# Patient Record
Sex: FEMALE | Race: WHITE | NOT HISPANIC OR LATINO | Employment: OTHER | URBAN - METROPOLITAN AREA
[De-identification: names, ages, dates, MRNs, and addresses within clinical notes are randomized per-mention and may not be internally consistent; named-entity substitution may affect disease eponyms.]

---

## 2017-01-03 ENCOUNTER — TELEPHONE (OUTPATIENT)
Dept: NEUROLOGY | Facility: CLINIC | Age: 82
End: 2017-01-03

## 2017-01-03 NOTE — TELEPHONE ENCOUNTER
----- Message from Ayan Alegre sent at 1/3/2017  8:41 AM CST -----  Contact: [phone]/daughter  Leidy  Pt daughter states her mom stopped taking the duloxetine (CYMBALTA) 60 MG capsule.  Pt states the medicine is too strong and makes her sleepy. Also daughter has a question about EEG test.  Please advise

## 2017-01-03 NOTE — TELEPHONE ENCOUNTER
I spoke with the daughter and she says her mother stopped the medication. The patient's daughter also says she is getting sick and if it gets worse she will cancel the EEG due to having to was her hair.  The patient will wash it Thursday night verses the morning of the EEG.

## 2017-01-04 ENCOUNTER — TELEPHONE (OUTPATIENT)
Dept: INTERNAL MEDICINE | Facility: CLINIC | Age: 82
End: 2017-01-04

## 2017-01-04 ENCOUNTER — TELEPHONE (OUTPATIENT)
Dept: FAMILY MEDICINE | Facility: CLINIC | Age: 82
End: 2017-01-04

## 2017-01-04 DIAGNOSIS — R05.9 COUGH: Primary | ICD-10-CM

## 2017-01-04 RX ORDER — BENZONATATE 200 MG/1
200 CAPSULE ORAL 2 TIMES DAILY PRN
Qty: 20 CAPSULE | Refills: 0 | Status: SHIPPED | OUTPATIENT
Start: 2017-01-04 | End: 2017-01-11

## 2017-01-05 ENCOUNTER — LAB VISIT (OUTPATIENT)
Dept: LAB | Facility: HOSPITAL | Age: 82
End: 2017-01-05
Attending: INTERNAL MEDICINE
Payer: MEDICARE

## 2017-01-05 DIAGNOSIS — T50.2X5A DIURETIC-INDUCED HYPOKALEMIA: ICD-10-CM

## 2017-01-05 DIAGNOSIS — E87.6 DIURETIC-INDUCED HYPOKALEMIA: ICD-10-CM

## 2017-01-05 LAB
ALBUMIN SERPL BCP-MCNC: 3.4 G/DL
ALP SERPL-CCNC: 102 U/L
ALT SERPL W/O P-5'-P-CCNC: 6 U/L
ANION GAP SERPL CALC-SCNC: 10 MMOL/L
AST SERPL-CCNC: 14 U/L
BILIRUB SERPL-MCNC: 0.7 MG/DL
BUN SERPL-MCNC: 10 MG/DL
CALCIUM SERPL-MCNC: 9.3 MG/DL
CHLORIDE SERPL-SCNC: 104 MMOL/L
CO2 SERPL-SCNC: 26 MMOL/L
CREAT SERPL-MCNC: 1 MG/DL
EST. GFR  (AFRICAN AMERICAN): 58.5 ML/MIN/1.73 M^2
EST. GFR  (NON AFRICAN AMERICAN): 50.8 ML/MIN/1.73 M^2
GLUCOSE SERPL-MCNC: 134 MG/DL
POTASSIUM SERPL-SCNC: 4.3 MMOL/L
PROT SERPL-MCNC: 6.7 G/DL
SODIUM SERPL-SCNC: 140 MMOL/L

## 2017-01-05 PROCEDURE — 80053 COMPREHEN METABOLIC PANEL: CPT

## 2017-01-05 PROCEDURE — 36415 COLL VENOUS BLD VENIPUNCTURE: CPT | Mod: PO

## 2017-01-05 NOTE — TELEPHONE ENCOUNTER
Informed daughter Tessalon sent to pharmacy and instructed to call office if no improvement. Daughter voices understanding.

## 2017-01-05 NOTE — TELEPHONE ENCOUNTER
----- Message from Hortencia Cherry LPN sent at 1/4/2017 11:24 AM CST -----  Contact: Nori/ daughter/ [phone]   Nori reports her mother is hoarse, coughing(non-Productive), no fever or chills.  Wants to know if you can prescribe cough med.  ----- Message -----     From: Sulma Sanders     Sent: 1/3/2017   9:18 AM       To: Sean MARR Staff    Requesting to speak to the nurse states the patient woke up with cough and phlegm wants to know if she can get a cough syrup?

## 2017-01-05 NOTE — TELEPHONE ENCOUNTER
----- Message from Pino Estrada MD sent at 1/4/2017  6:07 PM CST -----  Contact: Nori/ daughter/ [phone]  Patient can take Tessalon Perles.  I can send these to the pharmacy.    ----- Message -----     From: Hortencia Cherry LPN     Sent: 1/4/2017  11:24 AM       To: Pino Estrada MD     Nori reports her mother is hoarse, coughing(non-Productive), no fever or chills.  Wants to know if you can prescribe cough med.  ----- Message -----     From: Sulma Sanders     Sent: 1/3/2017   9:18 AM       To: Sean MARR Staff    Requesting to speak to the nurse states the patient woke up with cough and phlegm wants to know if she can get a cough syrup?

## 2017-01-06 ENCOUNTER — HOSPITAL ENCOUNTER (OUTPATIENT)
Dept: NEUROLOGY | Facility: HOSPITAL | Age: 82
Discharge: HOME | End: 2017-01-06
Attending: PSYCHIATRY & NEUROLOGY
Payer: MEDICARE

## 2017-01-06 PROCEDURE — 95816 EEG AWAKE AND DROWSY: CPT

## 2017-01-06 PROCEDURE — 95816 EEG AWAKE AND DROWSY: CPT | Mod: 26,,,

## 2017-01-09 NOTE — PROCEDURES
DATE OF STUDY:  01/06/2017    The patient of Dr. Kidd.    EEG NUMBER:  [id number]    CLINICAL INFORMATION:  An 87-year-old lady with abnormal jerking movements,   which occur when she is in pain or aggravated.  The patient also has substantial   insomnia and restless legs.    ELECTROENCEPHALOGRAM REPORT    METHODOLOGY:  Electroencephalographic (EEG) recording is recorded with   electrodes placed according to the International 10-20 placement system.  Thirty   two (32) channels of digital signal (sampling rate of 512/sec), including T1   and T2, were simultaneously recorded from the scalp and may include EKG, EMG,   and/or eye monitors.  Recording band pass was 0.1 to 512 Hz.  Digital video   recording of the patient is simultaneously recorded with the EEG.  The patient   is instructed to report clinical symptoms which may occur during the recording   session.  EEG and video recording are stored and archived in digital format.    Activation procedures, which include photic stimulation, hyperventilation and   instructing patients to perform simple tasks, are done in selected patients    The EEG is displayed on a monitor screen and can be reviewed using different   montages.  Computer assisted-analysis is employed to detect spike and   electrographic seizure activity.   The entire record is submitted for computer   analysis.  The entire recording is visually reviewed, and the times identified   by computer analysis as being spikes or seizures are reviewed again.      Compressed spectral analysis (CSA) is also performed on the activity recorded   from each individual channel.  This is displayed as a power display of   frequencies from 0 to 30 Hz over time.   The CSA is reviewed looking for   asymmetries in power between homologous areas of the scalp, then compared with   the original EEG recording.      [employer] software was also utilized in the review of this study.  This software   suite analyzes the EEG recording in  multiple domains.  Coherence and rhythmicity   are computed to identify EEG sections which may contain organized seizures.    Each channel undergoes analysis to detect the presence of spike and sharp waves   which have special and morphological characteristics of epileptic activity.  The   routine EEG recording is converted from special into frequency domain.  This is   then displayed comparing homologous areas to identify areas of significant   asymmetry.  Algorithm to identify non-cortically generated artifact is used to   separate artifact from the EEG.    EEG FINDINGS:  The resting background is low voltage fast activity.  Some   occasional semi-rhythmical 8-9 cycle per second posterior alpha rhythm can be   identified at intervals as well.  Rather prominent muscle as well, some eye   movement artifact is seen more anteriorly when the patient is fully alert.    Well-developed sleep record is never identified.  Photic stimulation does not   significantly alter the record.  There is a resting tachycardia with some   irregularity up to 110 beats per minute on the EKG channel recording.    IMPRESSION:  This is a normal waking EEG.    CLINICAL IMPLICATION:  This record fails to give evidence of cerebral electrical   dysfunction.  Incidental note is made of a mild resting tachycardia with some   irregularity.  Clinical correlation is recommended.      CORIE  dd: 01/09/2017 13:00:34 (CST)  td: 01/09/2017 16:02:10 ([facility])  Doc ID   #[id number]  Job ID #[id number]    CC:

## 2017-01-23 DIAGNOSIS — G25.81 RESTLESS LEG SYNDROME: ICD-10-CM

## 2017-01-23 RX ORDER — PRAMIPEXOLE DIHYDROCHLORIDE 0.12 MG/1
0.25 TABLET ORAL 2 TIMES DAILY PRN
Qty: 120 TABLET | Refills: 1 | Status: SHIPPED | OUTPATIENT
Start: 2017-01-23 | End: 2017-02-02 | Stop reason: ALTCHOICE

## 2017-01-24 ENCOUNTER — TELEPHONE (OUTPATIENT)
Dept: INTERNAL MEDICINE | Facility: CLINIC | Age: 82
End: 2017-01-24

## 2017-01-24 NOTE — TELEPHONE ENCOUNTER
Spoke with pt's daughter Nori to informed that a new prescription has been sent to the pharmacy with one refill. Ms. Julio voiced understanding.

## 2017-01-24 NOTE — TELEPHONE ENCOUNTER
----- Message from Pino Estrada MD sent at 1/23/2017  6:54 PM CST -----  Contact: self/[phone]  There should be plenty of refills at the pharmacy    ----- Message -----     From: Milana Kendall MA     Sent: 1/23/2017  10:38 AM       To: Pino Estrada MD    Please advise  ----- Message -----     From: Chetna Su     Sent: 1/23/2017  10:03 AM       To: Sean Romero    The prescription you wrote for the   pramipexole (MIRAPEX) 0.125 MG tablet is only 60 tablets and she takes 4 a day; she is going to run out.

## 2017-02-02 ENCOUNTER — LAB VISIT (OUTPATIENT)
Dept: LAB | Facility: HOSPITAL | Age: 82
End: 2017-02-02
Attending: INTERNAL MEDICINE
Payer: MEDICARE

## 2017-02-02 ENCOUNTER — OFFICE VISIT (OUTPATIENT)
Dept: INTERNAL MEDICINE | Facility: CLINIC | Age: 82
End: 2017-02-02
Payer: MEDICARE

## 2017-02-02 VITALS
SYSTOLIC BLOOD PRESSURE: 118 MMHG | OXYGEN SATURATION: 97 % | HEART RATE: 88 BPM | BODY MASS INDEX: 25.82 KG/M2 | WEIGHT: 119.69 LBS | HEIGHT: 57 IN | DIASTOLIC BLOOD PRESSURE: 50 MMHG

## 2017-02-02 DIAGNOSIS — M54.16 LUMBAR RADICULOPATHY: Primary | ICD-10-CM

## 2017-02-02 DIAGNOSIS — G62.9 PERIPHERAL POLYNEUROPATHY: ICD-10-CM

## 2017-02-02 DIAGNOSIS — G25.81 RESTLESS LEG SYNDROME: ICD-10-CM

## 2017-02-02 DIAGNOSIS — G25.3 MYOCLONUS: ICD-10-CM

## 2017-02-02 LAB
ALBUMIN SERPL BCP-MCNC: 3.7 G/DL
ALP SERPL-CCNC: 98 U/L
ALT SERPL W/O P-5'-P-CCNC: 8 U/L
ANION GAP SERPL CALC-SCNC: 6 MMOL/L
AST SERPL-CCNC: 15 U/L
BILIRUB SERPL-MCNC: 0.9 MG/DL
BUN SERPL-MCNC: 17 MG/DL
CALCIUM SERPL-MCNC: 9.3 MG/DL
CHLORIDE SERPL-SCNC: 106 MMOL/L
CO2 SERPL-SCNC: 25 MMOL/L
CREAT SERPL-MCNC: 1.1 MG/DL
EST. GFR  (AFRICAN AMERICAN): 52.2 ML/MIN/1.73 M^2
EST. GFR  (NON AFRICAN AMERICAN): 45.3 ML/MIN/1.73 M^2
GLUCOSE SERPL-MCNC: 87 MG/DL
MAGNESIUM SERPL-MCNC: 2.1 MG/DL
POTASSIUM SERPL-SCNC: 4.6 MMOL/L
PROT SERPL-MCNC: 6.7 G/DL
SODIUM SERPL-SCNC: 137 MMOL/L

## 2017-02-02 PROCEDURE — 80053 COMPREHEN METABOLIC PANEL: CPT

## 2017-02-02 PROCEDURE — 84446 ASSAY OF VITAMIN E: CPT

## 2017-02-02 PROCEDURE — 99214 OFFICE O/P EST MOD 30 MIN: CPT | Mod: S$GLB,,, | Performed by: INTERNAL MEDICINE

## 2017-02-02 PROCEDURE — 1157F ADVNC CARE PLAN IN RCRD: CPT | Mod: S$GLB,,, | Performed by: INTERNAL MEDICINE

## 2017-02-02 PROCEDURE — 84591 ASSAY OF NOS VITAMIN: CPT

## 2017-02-02 PROCEDURE — 1159F MED LIST DOCD IN RCRD: CPT | Mod: S$GLB,,, | Performed by: INTERNAL MEDICINE

## 2017-02-02 PROCEDURE — 83735 ASSAY OF MAGNESIUM: CPT

## 2017-02-02 PROCEDURE — 1160F RVW MEDS BY RX/DR IN RCRD: CPT | Mod: S$GLB,,, | Performed by: INTERNAL MEDICINE

## 2017-02-02 PROCEDURE — 99999 PR PBB SHADOW E&M-EST. PATIENT-LVL III: CPT | Mod: PBBFAC,,, | Performed by: INTERNAL MEDICINE

## 2017-02-02 PROCEDURE — 1125F AMNT PAIN NOTED PAIN PRSNT: CPT | Mod: S$GLB,,, | Performed by: INTERNAL MEDICINE

## 2017-02-02 PROCEDURE — 36415 COLL VENOUS BLD VENIPUNCTURE: CPT | Mod: PO

## 2017-02-02 RX ORDER — GABAPENTIN 100 MG/1
100 CAPSULE ORAL 3 TIMES DAILY
Qty: 90 CAPSULE | Refills: 1 | COMMUNITY
Start: 2017-02-02 | End: 2017-02-08 | Stop reason: SDUPTHER

## 2017-02-02 RX ORDER — TRAMADOL HYDROCHLORIDE 50 MG/1
TABLET ORAL
Qty: 180 TABLET | Refills: 0 | COMMUNITY
Start: 2017-02-02 | End: 2017-02-03 | Stop reason: SDUPTHER

## 2017-02-02 NOTE — PATIENT INSTRUCTIONS
· 3 times per day tramadol, 1 tablet of the 50 mg  · Should have some effect at 1 hour with max within 2 hours  · If 50 mg does not work at 1 hour a 2nd tablet can be taken  · No MORE than 6 tablets of the 50 mg in 24hrs   · Do not take mirapex at this time  · Gabapentin can be taken 1 tablet 3 times daily. For it to make any difference it has to be taken daily.   · If tramadol is causing sedation, stagger how you give the gabapentin, for example wait 2 hrs after tramadol dose  · Stool softner on tramadol if constipation is starting to happen   ·

## 2017-02-02 NOTE — MR AVS SNAPSHOT
[facility]   [place]  [place] [address]  Phone: [phone]  Fax: [phone]                  Angel Rincon   2017 2:40 PM   Office Visit    Description:  Female : 1929   Provider:  Pino Estrada MD   Department:  [facility]           Reason for Visit     Hypertension                To Do List           Future Appointments        Provider Department Dept Phone    2017 3:30 PM [facility] [phone]    2017 1:20 PM Xu Kidd MD [facility] [phone]    3/2/2017 2:00 PM Pino Estrada MD [facility] [phone]    3/23/2017 9:40 AM Xu Kidd MD [facility] [phone]    3/23/2017 1:50 PM Caitlyn Loyola MD [place] [facility] [phone]      Goals (5 Years of Data)     None      [facility] On [facility]     [facility] Care [facility] -  Assistance  Registered nurses in the [facility] provide clinical advisement, health education, appointment booking, and other advisory services.  Call for this free service at [phone].             Medications           Message regarding Medications     Verify the changes and/or additions to your medication regime listed below are the same as discussed with your clinician today.  If any of these changes or additions are incorrect, please notify your healthcare provider.        CHANGE how you are taking these medications     Start Taking Instead of    tramadol (ULTRAM) 50 mg tablet tramadol (ULTRAM) 50 mg tablet  (Previously Discontinued)    Dosage:  1-2 tablets as needed for severe pain every 8 hrs Dosage:  Take 50 mg by mouth daily as needed.      Reason for Change:  Reorder       STOP taking these medications     pramipexole (MIRAPEX) 0.125 MG tablet Take 2 tablets (0.25 mg total) by mouth 2 (two) times daily as needed.           Verify that the below list of medications is an accurate  "representation of the medications you are currently taking.  If none reported, the list may be blank. If incorrect, please contact your healthcare provider. Carry this list with you in case of emergency.           Current Medications     diltiazem (CARDIZEM CD) 240 MG 24 hr capsule     furosemide (LASIX) 20 MG tablet Take 20 mg by mouth once daily.    hydroxyurea (HYDREA) 500 mg Cap TAKE 1 CAPSULE TWICE DAILY    losartan (COZAAR) 25 MG tablet Take 25 mg by mouth once daily.    melatonin 10 mg Cap Take 10 mg by mouth every evening.    nitroGLYCERIN 0.4 MG/DOSE TL SPRY (NITROLINGUAL) 0.4 mg/dose spray     potassium chloride SA (K-DUR,KLOR-CON) 20 MEQ tablet Take 1 tablet (20 mEq total) by mouth once daily.    rivaroxaban (XARELTO) 20 mg Tab Take by mouth once daily.     rosuvastatin (CRESTOR) 10 MG tablet Take 10 mg by mouth once daily.      gabapentin (NEURONTIN) 100 MG capsule Take 1 capsule (100 mg total) by mouth 3 (three) times daily.    tramadol (ULTRAM) 50 mg tablet 1-2 tablets as needed for severe pain every 8 hrs           Clinical Reference Information           Your Vitals Were     BP Pulse Height Weight SpO2 BMI    118/50 (BP Location: Right arm, Patient Position: Sitting, BP Method: Manual) 88 4' 9" (1.448 m) 54.3 kg (119 lb 11.4 oz) 97% 25.9 kg/m2      Blood Pressure          Most Recent Value    BP  (!)  118/50      Allergies as of 2/2/2017     Citalopram    Codeine    Sulfa (Sulfonamide Antibiotics)    Xanax [Alprazolam]      Immunizations Administered on Date of Encounter - 2/2/2017     None      Instructions    · 3 times per day tramadol, 1 tablet of the 50 mg  · Should have some effect at 1 hour with max within 2 hours  · If 50 mg does not work at 1 hour a 2nd tablet can be taken  · No MORE than 6 tablets of the 50 mg in 24hrs   · Do not take mirapex at this time  · Gabapentin can be taken 1 tablet 3 times daily. For it to make any difference it has to be taken daily.   · If tramadol is causing " sedation, stagger how you give the gabapentin, for example wait 2 hrs after tramadol dose  · Stool softner on tramadol if constipation is starting to happen   ·        Language Assistance Services     ATTENTION: Language assistance services are available, free of charge. Please call [phone].      ATENCIÓN: Si habla ynes, tiene a devries disposición servicios gratuitos de asistencia lingüística. Llame al [phone].     CHÚ Ý: N?u b?n nói Ti?ng Vi?t, có các d?ch v? h? tr? ngôn ng? mi?n phí dành cho b?n. G?i s? [phone].         [facility] complies with applicable Federal civil rights laws and does not discriminate on the basis of race, color, national origin, age, disability, or sex.

## 2017-02-03 ENCOUNTER — TELEPHONE (OUTPATIENT)
Dept: PAIN MEDICINE | Facility: CLINIC | Age: 82
End: 2017-02-03

## 2017-02-03 ENCOUNTER — TELEPHONE (OUTPATIENT)
Dept: INTERNAL MEDICINE | Facility: CLINIC | Age: 82
End: 2017-02-03

## 2017-02-03 DIAGNOSIS — M54.17 LUMBOSACRAL RADICULOPATHY: Primary | ICD-10-CM

## 2017-02-03 DIAGNOSIS — G25.81 RESTLESS LEG SYNDROME: ICD-10-CM

## 2017-02-03 RX ORDER — TRAMADOL HYDROCHLORIDE 50 MG/1
50 TABLET ORAL EVERY 8 HOURS PRN
Qty: 60 TABLET | Refills: 0 | Status: SHIPPED | OUTPATIENT
Start: 2017-02-03 | End: 2017-03-05

## 2017-02-03 NOTE — TELEPHONE ENCOUNTER
----- Message from Pino Estrada MD sent at 2/3/2017 12:58 PM CST -----  Please contact patient's daughter.  Prescription tramadol sent to the pharmacy.  One month supply only.  Refill contingent on patient coming for 1 month follow-up.      ----- Message -----     From: Milana Kendall MA     Sent: 2/3/2017  11:51 AM       To: Pino Estrada MD    Please advise  ----- Message -----     From: Lita Aranda     Sent: 2/3/2017  11:44 AM       To: Sean MARR Staff    Patient no. [phone]    Patient needs Tramadol, 3 daily, called into [facility] in [place]    Patient needs today.

## 2017-02-03 NOTE — TELEPHONE ENCOUNTER
Left message for pt's daughter Nori to inform that pt's prescription has been sent to the pharmacy.

## 2017-02-03 NOTE — TELEPHONE ENCOUNTER
appt scheduled with pt's daughter, Nori for Thursday, March 2, 2017 at 230pm.  appt letter mailed to pt's home.  She verbalized understanding of appt date/time.

## 2017-02-03 NOTE — TELEPHONE ENCOUNTER
----- Message from Pino Estrada MD sent at 2/3/2017 12:24 PM CST -----  Can your office please reach out to get something scheduled?     ----- Message -----     From: Jerry Mccord MD     Sent: 2/3/2017   8:49 AM       To: Pino Estrada MD    Yes, she responded well to the caudal MAUREEN, it just didn't last enough, i think she should come back and see me for possible another injection, I agree with you that she should not be on both together   ----- Message -----     From: Pino Estrada MD     Sent: 2/2/2017   5:28 PM       To: Jerry Mccord MD    She definitely has multiple sources of pain. If she can tolerate a neuropathic pain med I will keep her on it, but when she starts narcotics and then asks for RLS meds on top of all of that, it gets tricky. This pain patterns sounds like radiating pain from the back as her principal pain complaint now    ----- Message -----     From: Jerry Mccord MD     Sent: 2/2/2017   3:32 PM       To: Pino Estrada MD    She is a tough one. Did she respond to injections? Keep in mind she had bad neuropathy rekated to alcohol abuse in the past.  ----- Message -----     From: Pino Estrada MD     Sent: 2/2/2017   3:26 PM       To: Jerry Mccord MD    Patient continues to have RLE radiculopathy. Starting narcotics again given that her predominant pain pattern doesn't seem to be RLS and she does terribly when combining mirapex with narcotic. What other options care you perceive for her?

## 2017-02-04 NOTE — PROGRESS NOTES
Portions of this note are generated with voice recognition software. Typographical errors may exist.     SUBJECTIVE:    This is a/an 87 y.o. female here for primary care visit for  Chief Complaint   Patient presents with    Chronic Pain     follow up     Chronic pain.  Patient states that she has at least 3 pain patterns.  States that she has tingling in her lower extremities, she has radiculopathy starting in the hip and radiating down her right leg, and muscle spasms.  Principally the patient states that radiculopathy is the main source of pain for her.  States that when she has a sudden onset of pain she will stand up and this helps to distract her from the pain.  Pain syndromes have resulted in acute on chronic insomnia.  Patient and daughter verify that she is not using narcotic medication even though they have narcotic medication at home left over from previous prescriptions.  Daughter states that narcotic medication has been secured.    Current pain management regimen includes gabapentin 100 mg 3 times daily as recommended by recent neurologist.  Mirapex 3 tablets in the morning and one tablet later in the afternoon.  Patient states that this is not working at all.  Patient does not voice using muscle relaxer.    Myoclonic jerks.  Patient continues to suffer from this.  Causes significant disruption in sleeping behavior.  Also causes significant emotional distress.  Patient would like to pursue additional diagnostic measures.    Medications Reviewed and Updated    Past medical, family, and social histories were reviewed and updated.    Review of Systems negative unless noted otherwise in history of present illness-  General ROS: negative  Psychological ROS: negative  Hematological and Lymphatic ROS: negative  Musculoskeletal ROS: negative  Neurological ROS: negative    Allergic:    Review of patient's allergies indicates:   Allergen Reactions    Citalopram      Other reaction(s): agitation    Codeine  Nausea Only    Sulfa (sulfonamide antibiotics)      Other reaction(s): Swelling    Xanax [alprazolam] Other (See Comments)     Unspecified reaction       OBJECTIVE:  BP: (!) 118/50 Pulse: 88    Wt Readings from Last 3 Encounters:   02/02/17 54.3 kg (119 lb 11.4 oz)   12/23/16 52.2 kg (115 lb 1.3 oz)   12/11/16 52.2 kg (115 lb)    Body mass index is 25.9 kg/(m^2).  Previous Blood Pressure Readings :   BP Readings from Last 3 Encounters:   02/02/17 (!) 118/50   12/23/16 137/71   12/12/16 (!) 122/58     GEN: No apparent distress  HEENT: sclera non-icteric, conjunctiva clear  CV: no peripheral edema  PULM: breathing non-labored  ABD: Obese, protuberant abdomen.  PSYCH moderate psychomotor distress due to pain.  Pacing in the room.  MSK: able to rise from chair without assistance  · No significant point tenderness along the trochanteric bursa or sacroiliac joints.  Neurologic: Equal lower extremity strength bilaterally.  SKIN: normal skin turgor    Pertinent Labs Reviewed       ASSESSMENT/PLAN:    Chronic pain syndromes.  Multifactorial.  Lumbosacral radiculopathy.  Restless leg syndrome.  Myoclonic jerking.  Recommend patient suspend Mirapex.  Continue gabapentin 100 mg 3 times a day.  Tramadol 1-2 tablets every 8 hours.  educated patient on common and serious side effects of this medication.  This is not an ideal dose for neuropathic pain however in this elderly patient she likely would not tolerate higher doses in the setting of narcotic medication.  Given acuity of symptoms, recommend she resume narcotic medication for the next 30 days and return to pain management for repeat injection.  Return to clinic within 30 days to assess ongoing need for narcotic medication.  Ultimately the patient needs referral for pain management.  She has proven to be refractory to multiple pharmacologic approaches.    Myoclonic jerking.  Not optimally controlled.  Recommend patient pursue further workup in the seizure monitoring unit  per neurology        Future Appointments  Date Time Provider Department Center   2/16/2017 1:20 PM Xu Kidd MD [facility] NEURO [facility]   3/2/2017 2:00 PM Pino Estrada MD [facility]   3/2/2017 2:30 PM Jerry Mccord MD [facility] PAINMGT [facility]   3/23/2017 9:40 AM Xu Kidd MD [facility] NEURO [facility]   4/6/2017 2:20 PM Caitlyn Loyola MD [facility]       Pino Estrada  2/4/2017  4:27 PM

## 2017-02-07 LAB — A-TOCOPHEROL VIT E SERPL-MCNC: 539 UG/DL (ref 500–1800)

## 2017-02-08 ENCOUNTER — TELEPHONE (OUTPATIENT)
Dept: FAMILY MEDICINE | Facility: CLINIC | Age: 82
End: 2017-02-08

## 2017-02-08 DIAGNOSIS — G62.9 PERIPHERAL POLYNEUROPATHY: ICD-10-CM

## 2017-02-08 DIAGNOSIS — F10.21 CHRONIC ALCOHOLISM IN REMISSION: Primary | ICD-10-CM

## 2017-02-08 DIAGNOSIS — G25.81 RESTLESS LEG SYNDROME: ICD-10-CM

## 2017-02-08 DIAGNOSIS — M54.16 LUMBAR RADICULOPATHY: ICD-10-CM

## 2017-02-08 RX ORDER — GABAPENTIN 100 MG/1
CAPSULE ORAL
Qty: 60 CAPSULE | Refills: 1 | Status: SHIPPED | OUTPATIENT
Start: 2017-02-08 | End: 2017-03-23

## 2017-02-08 NOTE — TELEPHONE ENCOUNTER
Daughter states that patient is having difficulty with breakthrough pain.  States that tramadol does provide relief for approximately 2 or 3 hours.  Patient is taking it 2-3 times per day.  However, the patient had a rough morning with side effects and has decided to completely abandon this pain management plan.  Daughter wonders what to do next.  Side effects described but the patient include general fatigue, sedation.    This provider recommends suspending gabapentin as it is likely contributing to sedation in the setting of narcotic medication.  Low-dose gabapentin is unlikely to produce significant pain reducing effect.  Therefore I recommend continuing tramadol 3 times daily as needed.  Given patient's significant nighttime symptoms, patient can elect to restart gabapentin 1-2 tablets daily at bedtime.    Recommend strongly that family establish with pain management specialist to further address pharmacotherapy.  At the same time, recommend that patient not abandon appointment with Dr. Mccord in the future.  Also strongly recommend patient continue to follow with neurology.  Daughter voicing understanding.

## 2017-02-09 ENCOUNTER — TELEPHONE (OUTPATIENT)
Dept: INTERNAL MEDICINE | Facility: CLINIC | Age: 82
End: 2017-02-09

## 2017-02-09 ENCOUNTER — TELEPHONE (OUTPATIENT)
Dept: NEUROLOGY | Facility: CLINIC | Age: 82
End: 2017-02-09

## 2017-02-09 NOTE — TELEPHONE ENCOUNTER
----- Message from Kelsie Patton sent at 2/9/2017 10:42 AM CST -----  Contact: self/[phone]  Patient would like to know if Isamar is working on the appointment for the pain management clinic in [place] because she didn't sleep at all last night.  Please advise

## 2017-02-09 NOTE — TELEPHONE ENCOUNTER
Informed daughter mother is scheduled with Dr. Jay in [place] for 2- at 130pm. Daughter was given option for sooner appt but would be at [facility]. Daughter reports she would prefer to take the [employer] appt.

## 2017-02-09 NOTE — TELEPHONE ENCOUNTER
Spoke to Nori, pts daughter, who stated she will have to speak to her mother to see if she will stay for an EMU admission. Nori has my direct # to call and schedule.

## 2017-02-16 LAB — BIOTIN SERPL-SCNC: 338.9 PG/ML (ref 221–3004)

## 2017-03-01 ENCOUNTER — HOSPITAL ENCOUNTER (OUTPATIENT)
Facility: HOSPITAL | Age: 82
Discharge: HOME | End: 2017-03-02
Attending: EMERGENCY MEDICINE | Admitting: INTERNAL MEDICINE
Payer: MEDICARE

## 2017-03-01 DIAGNOSIS — I10 ESSENTIAL HYPERTENSION: ICD-10-CM

## 2017-03-01 DIAGNOSIS — L03.115 BILATERAL LOWER LEG CELLULITIS: ICD-10-CM

## 2017-03-01 DIAGNOSIS — D45 POLYCYTHEMIA VERA: ICD-10-CM

## 2017-03-01 DIAGNOSIS — D64.9 NORMOCYTIC ANEMIA: ICD-10-CM

## 2017-03-01 DIAGNOSIS — L03.116 BILATERAL LOWER LEG CELLULITIS: ICD-10-CM

## 2017-03-01 DIAGNOSIS — R06.02 SOB (SHORTNESS OF BREATH): ICD-10-CM

## 2017-03-01 DIAGNOSIS — I48.91 ATRIAL FIBRILLATION, CONTROLLED: ICD-10-CM

## 2017-03-01 DIAGNOSIS — I50.9 ACUTE CONGESTIVE HEART FAILURE, UNSPECIFIED CONGESTIVE HEART FAILURE TYPE: Primary | ICD-10-CM

## 2017-03-01 DIAGNOSIS — F17.229 CHEWING TOBACCO NICOTINE DEPENDENCE WITH NICOTINE-INDUCED DISORDER: ICD-10-CM

## 2017-03-01 DIAGNOSIS — E78.5 HYPERLIPIDEMIA, UNSPECIFIED HYPERLIPIDEMIA TYPE: ICD-10-CM

## 2017-03-01 DIAGNOSIS — G25.81 RESTLESS LEG SYNDROME: ICD-10-CM

## 2017-03-01 DIAGNOSIS — D72.829 LEUKOCYTOSIS, UNSPECIFIED TYPE: ICD-10-CM

## 2017-03-01 DIAGNOSIS — Z95.1 S/P CABG (CORONARY ARTERY BYPASS GRAFT): ICD-10-CM

## 2017-03-01 DIAGNOSIS — D75.839 THROMBOCYTOSIS: ICD-10-CM

## 2017-03-01 LAB
ALBUMIN SERPL BCP-MCNC: 3.8 G/DL
ALP SERPL-CCNC: 110 U/L
ALT SERPL W/O P-5'-P-CCNC: 8 U/L
ANION GAP SERPL CALC-SCNC: 8 MMOL/L
ANISOCYTOSIS BLD QL SMEAR: SLIGHT
AST SERPL-CCNC: 14 U/L
BASOPHILS # BLD AUTO: ABNORMAL K/UL
BASOPHILS NFR BLD: 0 %
BILIRUB SERPL-MCNC: 1 MG/DL
BNP SERPL-MCNC: 268 PG/ML
BUN SERPL-MCNC: 15 MG/DL
CALCIUM SERPL-MCNC: 9.1 MG/DL
CHLORIDE SERPL-SCNC: 103 MMOL/L
CO2 SERPL-SCNC: 25 MMOL/L
CREAT SERPL-MCNC: 0.9 MG/DL
DIFFERENTIAL METHOD: ABNORMAL
EOSINOPHIL # BLD AUTO: ABNORMAL K/UL
EOSINOPHIL NFR BLD: 0 %
ERYTHROCYTE [DISTWIDTH] IN BLOOD BY AUTOMATED COUNT: 19 %
EST. GFR  (AFRICAN AMERICAN): >60 ML/MIN/1.73 M^2
EST. GFR  (NON AFRICAN AMERICAN): 58 ML/MIN/1.73 M^2
GLUCOSE SERPL-MCNC: 109 MG/DL
HCT VFR BLD AUTO: 38.7 %
HGB BLD-MCNC: 11 G/DL
HYPOCHROMIA BLD QL SMEAR: ABNORMAL
LYMPHOCYTES # BLD AUTO: ABNORMAL K/UL
LYMPHOCYTES NFR BLD: 6 %
MCH RBC QN AUTO: 23.8 PG
MCHC RBC AUTO-ENTMCNC: 28.4 %
MCV RBC AUTO: 84 FL
MONOCYTES # BLD AUTO: ABNORMAL K/UL
MONOCYTES NFR BLD: 3 %
NEUTROPHILS NFR BLD: 89 %
NEUTS BAND NFR BLD MANUAL: 2 %
OVALOCYTES BLD QL SMEAR: ABNORMAL
PLATELET # BLD AUTO: 451 K/UL
PLATELET BLD QL SMEAR: ABNORMAL
PMV BLD AUTO: 10.7 FL
POIKILOCYTOSIS BLD QL SMEAR: SLIGHT
POLYCHROMASIA BLD QL SMEAR: ABNORMAL
POTASSIUM SERPL-SCNC: 3.8 MMOL/L
PROT SERPL-MCNC: 6.7 G/DL
RBC # BLD AUTO: 4.62 M/UL
SODIUM SERPL-SCNC: 136 MMOL/L
TROPONIN I SERPL DL<=0.01 NG/ML-MCNC: 0.01 NG/ML
WBC # BLD AUTO: 17.73 K/UL

## 2017-03-01 PROCEDURE — 99285 EMERGENCY DEPT VISIT HI MDM: CPT | Mod: 25

## 2017-03-01 PROCEDURE — 84484 ASSAY OF TROPONIN QUANT: CPT

## 2017-03-01 PROCEDURE — 80053 COMPREHEN METABOLIC PANEL: CPT

## 2017-03-01 PROCEDURE — 63600175 PHARM REV CODE 636 W HCPCS: Performed by: EMERGENCY MEDICINE

## 2017-03-01 PROCEDURE — 96374 THER/PROPH/DIAG INJ IV PUSH: CPT

## 2017-03-01 PROCEDURE — 85027 COMPLETE CBC AUTOMATED: CPT

## 2017-03-01 PROCEDURE — 83880 ASSAY OF NATRIURETIC PEPTIDE: CPT

## 2017-03-01 PROCEDURE — 25000003 PHARM REV CODE 250: Performed by: EMERGENCY MEDICINE

## 2017-03-01 PROCEDURE — 93005 ELECTROCARDIOGRAM TRACING: CPT

## 2017-03-01 PROCEDURE — 85007 BL SMEAR W/DIFF WBC COUNT: CPT

## 2017-03-01 RX ORDER — CEPHALEXIN 500 MG/1
500 CAPSULE ORAL
Status: COMPLETED | OUTPATIENT
Start: 2017-03-01 | End: 2017-03-01

## 2017-03-01 RX ORDER — FUROSEMIDE 10 MG/ML
40 INJECTION INTRAMUSCULAR; INTRAVENOUS
Status: COMPLETED | OUTPATIENT
Start: 2017-03-01 | End: 2017-03-01

## 2017-03-01 RX ADMIN — CEPHALEXIN 500 MG: 500 CAPSULE ORAL at 11:03

## 2017-03-01 RX ADMIN — FUROSEMIDE 40 MG: 10 INJECTION, SOLUTION INTRAMUSCULAR; INTRAVENOUS at 11:03

## 2017-03-01 NOTE — IP AVS SNAPSHOT
[facility]  [address]  [place] [address]  Phone: [phone]           Patient Discharge Instructions     Our goal is to set you up for success. This packet includes information on your condition, medications, and your home care. It will help you to care for yourself so you don't get sicker and need to go back to the hospital.     Please ask your nurse if you have any questions.        There are many details to remember when preparing to leave the hospital. Here is what you will need to do:    1. Take your medicine. If you are prescribed medications, review your Medication List in the following pages. You may have new medications to  at the pharmacy and others that you'll need to stop taking. Review the instructions for how and when to take your medications. Talk with your doctor or nurses if you are unsure of what to do.     2. Go to your follow-up appointments. Specific follow-up information is listed in the following pages. Your may be contacted by a transition nurse or clinical provider about future appointments. Be sure we have all of the phone numbers to reach you, if needed. Please contact your provider's office if you are unable to make an appointment.     3. Watch for warning signs. Your doctor or nurse will give you detailed warning signs to watch for and when to call for assistance. These instructions may also include educational information about your condition. If you experience any of warning signs to your health, call your doctor.               ** Verify the list of medication(s) below is accurate and up to date. Carry this with you in case of emergency. If your medications have changed, please notify your healthcare provider.             Medication List      START taking these medications        Additional Info                      carvedilol 6.25 MG tablet   Commonly known as:  COREG   Quantity:  60 tablet   Refills:  11   Dose:  6.25 mg    Instructions:  Take 1  tablet (6.25 mg total) by mouth 2 (two) times daily with meals.     Begin Date    AM    Noon    PM    Bedtime         CHANGE how you take these medications        Additional Info                      furosemide 40 MG tablet   Commonly known as:  LASIX   Quantity:  90 tablet   Refills:  6   Dose:  40 mg   What changed:    - medication strength  - how much to take    Instructions:  Take 1 tablet (40 mg total) by mouth once daily.     Begin Date    AM    Noon    PM    Bedtime       losartan 25 MG tablet   Commonly known as:  COZAAR   Quantity:  90 tablet   Refills:  7   Dose:  50 mg   What changed:  how much to take    Last time this was given:  50 mg on 3/2/2017  8:41 AM   Instructions:  Take 2 tablets (50 mg total) by mouth once daily.     Begin Date    AM    Noon    PM    Bedtime         CONTINUE taking these medications        Additional Info                      diltiaZEM 240 MG 24 hr capsule   Commonly known as:  CARDIZEM CD   Refills:  0    Last time this was given:  240 mg on 3/2/2017  8:41 AM     Begin Date    AM    Noon    PM    Bedtime       gabapentin 100 MG capsule   Commonly known as:  NEURONTIN   Quantity:  60 capsule   Refills:  1    Instructions:  1-2 tablets qHS. Stop if causing excess sedation     Begin Date    AM    Noon    PM    Bedtime       hydroxyurea 500 mg Cap   Commonly known as:  HYDREA   Quantity:  60 capsule   Refills:  5    Last time this was given:  500 mg on 3/2/2017  8:41 AM   Instructions:  TAKE 1 CAPSULE TWICE DAILY     Begin Date    AM    Noon    PM    Bedtime       melatonin 10 mg Cap   Refills:  0   Dose:  10 mg    Instructions:  Take 10 mg by mouth every evening.     Begin Date    AM    Noon    PM    Bedtime       nitroGLYCERIN 0.4 MG/DOSE TL SPRY 400 mcg/spray spray   Commonly known as:  NITROLINGUAL   Refills:  0      Begin Date    AM    Noon    PM    Bedtime       potassium chloride SA 20 MEQ tablet   Commonly known as:  K-DUR,KLOR-CON   Quantity:  90 tablet   Refills:  1    Dose:  20 mEq    Instructions:  Take 1 tablet (20 mEq total) by mouth once daily.     Begin Date    AM    Noon    PM    Bedtime       rosuvastatin 10 MG tablet   Commonly known as:  CRESTOR   Refills:  0   Dose:  10 mg    Last time this was given:  10 mg on 3/2/2017  8:41 AM   Instructions:  Take 10 mg by mouth once daily.     Begin Date    AM    Noon    PM    Bedtime       tramadol 50 mg tablet   Commonly known as:  ULTRAM   Quantity:  60 tablet   Refills:  0   Dose:  50 mg    Instructions:  Take 1 tablet (50 mg total) by mouth every 8 (eight) hours as needed (severe pain only). 2nd tablet allowed if no relief at 1 hr     Begin Date    AM    Noon    PM    Bedtime       XARELTO 20 mg Tab   Refills:  0   Generic drug:  rivaroxaban    Last time this was given:  15 mg on 3/2/2017  8:41 AM   Instructions:  Take by mouth once daily.     Begin Date    AM    Noon    PM    Bedtime            Where to Get Your Medications      You can get these medications from any pharmacy     Bring a paper prescription for each of these medications     carvedilol 6.25 MG tablet    furosemide 40 MG tablet    losartan 25 MG tablet                  Please bring to all follow up appointments:    1. A copy of your discharge instructions.  2. All medicines you are currently taking in their original bottles.  3. Identification and insurance card.    Please arrive 15 minutes ahead of scheduled appointment time.    Please call 24 hours in advance if you must reschedule your appointment and/or time.        Your Scheduled Appointments     Mar 07, 2017  2:00 PM [facility] Follow Up with MD Mirtha Florez - Internal Medicine [place])    [address]  [place] [address]   [phone]            Mar 23, 2017 10:40 AM CDT   Established Patient Visit with MD Criselda Rinconwood - Internal Medicine ([place])    [address]  [place] [address]   [phone]            Apr 06, 2017  2:20  PM CDT   Established Patient Visit with Caitlyn Loyola MD   [place] [facility] [place]    [address]  [place] [address] [phone]              Follow-up Information     Follow up with Pino Estrada MD. Schedule an appointment as soon as possible for a visit in 2 weeks.    Specialty:  Internal Medicine    Contact information:    [address] [facility] [id number] [phone]          Follow up with Chuck Arceo MD. Schedule an appointment as soon as possible for a visit in 2 weeks.    Specialty:  Cardiology    Contact information:    [address] [facility] [address]  [phone]        Referrals     Future Orders    Ambulatory consult to Hematology         Discharge Instructions     Future Orders    Diet Cardiac         Primary Diagnosis     Your primary diagnosis was:  Heart Failure      Admission Information     Date & Time Provider Department CSN    3/1/2017  9:19 PM Paulina Parker MD [facility] [id number]      Care Providers     Provider Role Specialty Primary office phone    Paulina Parker MD Attending Provider Internal Medicine [phone]      Your Vitals Were     BP                   133/75           Recent Lab Values        6/2/2016 3/2/2017                        2:33 PM  4:14 AM          A1C 5.8 5.6          Comment for A1C at  4:14 AM on 3/2/2017:  According to ADA guidelines, hemoglobin A1C <7.0% represents  optimal control in non-pregnant diabetic patients.  Different  metrics may apply to specific populations.   Standards of Medical Care in Diabetes - 2016.  For the purpose of screening for the presence of diabetes:  <5.7%     Consistent with the absence of diabetes  5.7-6.4%  Consistent with increasing risk for diabetes   (prediabetes)  >or=6.5%  Consistent with diabetes  Currently no consensus exists for use of hemoglobin A1C  for diagnosis of diabetes for children.        Allergies as of 3/2/2017        Reactions     Citalopram     Other reaction(s): agitation    Codeine Nausea Only    Sulfa (Sulfonamide Antibiotics)     Other reaction(s): Swelling    Xanax [Alprazolam] Other (See Comments)    Unspecified reaction      [facility]     [facility] Nurse Care Line - 24/7 Assistance  Unless otherwise directed by your provider, please contact [facility], our nurse care line that is available for 24/7 assistance.     Registered nurses in the [facility] provide clinical advisement, health education, appointment booking, and other advisory services.  Call for this free service at [phone].        Advance Directives     An advance directive is a document which, in the event you are no longer able to make decisions for yourself, tells your healthcare team what kind of treatment you do or do not want to receive, or who you would like to make those decisions for you.  If you do not currently have an advance directive, [facility] encourages you to create one.  For more information call:  [phone]), [phone] ([phone]),  or log on to [email or web address].        Language Assistance Services     ATTENTION: Language assistance services are available, free of charge. Please call [phone].      ATENCIÓN: Si habla español, tiene a devries disposición servicios gratuitos de asistencia lingüística. Llame al [phone].     CHÚ Ý: N?u b?n nói Ti?ng Vi?t, có các d?ch v? h? tr? ngôn ng? mi?n phí dành cho b?n. G?i s? [phone].        Heart Failure Education       Heart Failure: Being Active  You have a condition called heart failure. Being active doesnt mean that you have to wear yourself out. Even a little movement each day helps to strengthen your heart. If you cant get out to exercise, you can do simple stretching and strengthening exercises at home. These are good ways to keep you well-conditioned and prevent you and your heart from becoming excessively weak.    Ideas to get  you started  · Add a little movement to things you do now. Walk to mail letters. Park your car at the far end of the parking lot and walk to the store. Walk up a flight of stairs instead of taking the elevator.  · Choose activities you enjoy. You might walk, swim, or ride an exercise bike. Things like gardening and washing the car count, too. Other possibilities include: washing dishes, walking the dog, walking around the mall, and doing aerobic activities with friends.  · Join a group exercise program at a [facility], a senior center, or a community center. Or look into a hospital cardiac rehabilitation program. Ask your doctor if you qualify.  Tips to keep you going  · Get up and get dressed each day. Go to a coffee shop and read a newspaper or go somewhere that you'll be in the presence of other active people. Youll feel more like being active.  · Make a plan. Choose one or more activities that you enjoy and that you can easily do. Then plan to do at least one each day. You might write your plan on a calendar.  · Go with a friend or a group if you like company. This can help you feel supported and stay motivated, too.  · Plan social events that you enjoy. This will keep you mentally engaged as well as physically motivated to do things you find pleasure in.  For your safety  · Talk with your healthcare provider before starting an exercise program.  · Exercise indoors when its too hot or too cold outside, or when the air quality is poor. Try walking at a shopping mall.  · Wear socks and sturdy shoes to maintain your balance and prevent falls.  · Start slowly. Do a few minutes several times a day at first. Increase your time and speed little by little.  · Stop and rest whenever you feel tired or get short of breath.  · Dont push yourself on days when you dont feel well.  Date Last Reviewed: 3/20/2016  © 3378-6558 [employer]. [address] [place] [address], [place] [address]. All rights reserved. This  information is not intended as a substitute for professional medical care. Always follow your healthcare professional's instructions.              Heart Failure: Evaluating Your Heart  You have a condition called heart failure. To evaluate your condition, your doctor will examine you, ask questions, and do some tests. Along with looking for signs of heart failure, the doctor looks for any other health problems that may have led to heart failure. The results of your evaluation will help your doctor form a treatment plan.  Health history and physical exam  Your visit will start with a health history. Tell the doctor about any symptoms youve noticed and about all medicines you take. Then youll have a physical exam. This includes listening to your heartbeat and breathing. Youll also be checked for swelling (edema) in your legs and neck. When you have fluid buildup or fluid in the lungs, it may be called congestive heart failure.  Diagnosing heart failure     During an echocardiogram, sound waves bounce off the heart. These are converted into a picture on the screen.   The following may be done to help your doctor form a diagnosis:  · X-rays show the size and shape of your heart. These pictures can also show fluid in your lungs.  · An electrocardiogram (ECG or EKG) shows the pattern of your heartbeat. Small pads (electrodes) are placed on your chest, arms, and legs. Wires connect the pads to the ECG machine, which records your hearts electrical signals. This can give the doctor information about heart function.  · An echocardiogram uses ultrasound waves to show the structure and movement of your heart muscle. This shows how well the heart pumps. It also shows the thickness of the heart walls, and if the heart is enlarged. It is one of the most useful, non-invasive tests as it provides information about the heart's general function. This helps your doctor make treatment decisions.  · Lab tests evaluate small amounts  of blood or urine for signs of problems. A BNP lab test can help diagnose and evaluate heart failure. BNP stands for B-type natriuretic peptide. The ventricles secrete more BNP when heart failure worsens. Lab tests can also provide information about metabolic dysfunction or heart dysfunction.  Your treatment plan  Based on the results of your evaluation and tests, your doctor will develop a treatment plan. This plan is designed to relieve some of your heart failure symptoms and help make you more comfortable. Your treatment plan may include:  · Medicine to help your heart work better and improve your quality of life  · Changes in what you eat and drink to help prevent fluid from backing up in your body  · Daily monitoring of your weight and heart failure symptoms to see how well your treatment plan is working  · Exercise to help you stay healthy  · Help with quitting smoking  · Emotional and psychological support to help adjust to the changes  · Referrals to other specialists to make sure you are being treated comprehensively  Date Last Reviewed: 3/21/2016  © 7876-2744 [employer]. [address] [place] [address]. All rights reserved. This information is not intended as a substitute for professional medical care. Always follow your healthcare professional's instructions.              Heart Failure: Making Changes to Your Diet  You have a condition called heart failure. When you have heart failure, excess fluid is more likely to build up in your body because your heart isn't working well. This makes the heart work harder to pump blood. Fluid buildup causes symptoms such as shortness of breath and swelling (edema). This is often referred to as congestive heart failure or CHF. Controlling the amount of salt (sodium) you eat may help stop fluid from building up. Your doctor may also tell you to reduce the amount of fluid you drink.  Reading food labels    Your healthcare provider will tell you  how much sodium you can eat each day. Read food labels to keep track. Keep in mind that certain foods are high in salt. These include canned, frozen, and processed foods. Check the amount of sodium in each serving. Watch out for high-sodium ingredients. These include MSG (monosodium glutamate), baking soda, and sodium phosphate.   Eating less salt  Give yourself time to get used to eating less salt. It may take a little while. Here are some tips to help:  · Take the saltshaker off the table. Replace it with salt-free herb mixes and spices.  · Eat fresh or plain frozen vegetables. These have much less salt than canned vegetables.  · Choose low-sodium snacks like sodium-free pretzels, crackers, or air-popped popcorn.  · Dont add salt to your food when youre cooking. Instead, season your foods with pepper, lemon, garlic, or onion.  · When you eat out, ask that your food be cooked without added salt.  · Avoid eating fried foods as these often have a great deal of salt.  If youre told to limit fluids  You may need to limit how much fluid you have to help prevent swelling. This includes anything that is liquid at room temperature, such as ice cream and soup. If your doctor tells you to limit fluid, try these tips:  · Measure drinks in a measuring cup before you drink them. This will help you meet daily goals.  · Chill drinks to make them more refreshing.  · Suck on frozen lemon wedges to quench thirst.  · Only drink when youre thirsty.  · Chew sugarless gum or suck on hard candy to keep your mouth moist.  · Weigh yourself daily to know if your body's fluid content is rising.  My sodium goal  Your healthcare provider may give you a sodium goal to meet each day. This includes sodium found in food as well as salt that you add. My goal is to eat no more than ___________ mg of sodium per day.     When to call your doctor  Call your doctor right away if you have any symptoms of worsening heart failure. These can  include:  · Sudden weight gain  · Increased swelling of your legs or ankles  · Trouble breathing when youre resting or at night  · Increase in the number of pillows you have to sleep on  · Chest pain, pressure, discomfort, or pain in the jaw, neck, or back   Date Last Reviewed: 3/21/2016  © 6238-2223 [employer]. [address] [place] [address], [place] [address]. All rights reserved. This information is not intended as a substitute for professional medical care. Always follow your healthcare professional's instructions.              Heart Failure: Medicines to Help Your Heart    You have a condition called heart failure (also known as congestive heart failure, or CHF). Your doctor will likely prescribe medicines for heart failure and any underlying health problems you have. Most heart failure patients take one or more types of medicinen. Your healthcare provider will work to find the combination of medicines that works best for you.  Heart failure medicines  Here are the most common heart failure medicines:  · ACE inhibitors lower blood pressure and decrease strain on the heart. This makes it easier for the heart to pump. Angiotensin receptor blockers have similar effects. These are prescribed for some patients instead of ACE inhibitors.  · Beta-blockers relieve stress on the heart. They also improve symptoms. They may also improve the heart's pumping action over time.  · Diuretics (also called water pills) help rid your body of excess water. This can help rid your body of swelling (edema). Having less fluid to pump means your heart doesnt have to work as hard. Some diuretics make your body lose a mineral called potassium. Your doctor will tell you if you need to take supplements or eat more foods high in potassium.  · Digoxin helps your heart pump with more strength. This helps your heart pump more blood with each beat. So, more oxygen-rich blood travels to the rest of the body.  · Aldosterone antagonists  help alter hormones and decrease strain on the heart.  · Hydralazine and nitrates are two separate medicines used together to treat heart failure. They may come in one combination pill. They lower blood pressure and decrease how hard the heart has to pump.  Medicines for related conditions  Controlling other heart problems helps keep heart failure under control, too. Depending on other heart problems you have, medicines may be prescribed to:  · Lower blood pressure (antihypertensives).  · Lower cholesterol levels (statins).  · Prevent blood clots (anticoagulants or aspirin).  · Keep the heartbeat steady (antiarrhythmics).  Date Last Reviewed: 3/5/2016  © 1216-2740 [employer]. [address] [place] [address], [place] [address]. All rights reserved. This information is not intended as a substitute for professional medical care. Always follow your healthcare professional's instructions.              Heart Failure: Procedures That May Help    The heart is a muscle that pumps oxygen-rich blood to all parts of the body. When you have heart failure, the heart is not able to pump as well as it should. Blood and fluid may back up into the lungs (congestive heart failure), and some parts of the body dont get enough oxygen-rich blood to work normally. These problems lead to the symptoms of heart failure.     Certain procedures may help the heart pump better in some cases of heart failure. Some procedures are done to treat health problems that may have caused the heart failure such as coronary artery disease or heart rhythm problems. For more serious heart failure, other options are available.  Treating artery and valve problems  If you have coronary artery disease or valve disease, procedures may be done to improve blood flow. This helps the heart pump better, which can improve heart failure symptoms. First, your doctor may do a cardiac catheterization to help detect clogged blood vessels or valve damage. During this  procedure, a  thin tube (catheter) in inserted into a blood vessel and guided to the heart. There a dye is injected and a special type of X-ray (angiogram) is taken of the blood vessels. Procedures to open a blocked artery or fix damaged valves can also be done using catheterization.  · Angioplasty uses a balloon-tipped instrument at the end of the catheter. The balloon is inflated to widen the narrowed artery. In many cases, a stent is expanded to further support the narrowed artery. A stent is a metal mesh tube.  · Valve surgery repairs or replacement of faulty valves can also be done during catheterization so blood can flow properly through the chambers of the heart.  Bypass surgery is another option to help treat blocked arteries. It uses a healthy blood vessel from elsewhere in the body. The healthy blood vessel is attached above and below the blocked area so that blood can flow around the blocked artery.  Treating heart rhythm problems  A device may be placed in the chest to help a weak heart maintain a healthy, heartbeat so the heart can pump more effectively:  · Pacemaker. A pacemaker is an implanted device that regulates your heartbeat electronically. It monitors your heart's rhythm and generates a painless electric impulse that helps the heart beat in a regular rhythm. A pacemaker is programmed to meet your specific heart rhythm needs.  · Biventricular pacing/cardiac resynchronization therapy. A type of pacemaker that paces both pumping chambers of the heart at the same time to coordinate contractions and to improve the heart's function. Some people with heart failure are candidates for this therapy.  · Implantable cardioverter defibrillator. A device similar to a pacemaker that senses when the heart is beating too fast and delivers an electrical shock to convert the fast rhythm to a normal rhythm. This can be a life saving device.  In severe cases  In more serious cases of heart failure when other  treatments no longer work, other options may include:  · Ventricular assist devices (VADs). These are mechanical devices used to take over the pumping function for one or both of the heart's ventricles, or pumping chambers. A VAD may be necessary when heart failure progresses to the point that medicines and other treatments no longer help. In some cases, a VAD may be used as a bridge to transplant.  · Heart transplant. This is replacing the diseased heart with a healthy one from a donor. This is an option for a few people who are very sick. A heart transplant is very serious and not an option for all patients. Your doctor can tell you more.  Date Last Reviewed: 3/20/2016  © 7887-3213 [employer]. [address] [place] [address], [place] [address]. All rights reserved. This information is not intended as a substitute for professional medical care. Always follow your healthcare professional's instructions.              Heart Failure: Tracking Your Weight  You have a condition called heart failure. When you have heart failure, a sudden weight gain or a steady rise in weight is a warning sign that your body is retaining too much water and salt. This could mean your heart failure is getting worse. If left untreated, it can cause problems for your lungs and result in shortness of breath. Weighing yourself each day is the best way to know if youre retaining water. If your weight goes up quickly, call your doctor. You will be given instructions on how to get rid of the excess water. You will likely need medicines and to avoid salt. This will help your heart work better.  Call your doctor if you gain more than 2 pounds in 1 day, more than 5 pounds in 1 week, or whatever weight gain you were told to report by your doctor. This is often a sign of worsening heart failure and needs to be evaluated and treated. Your doctor will tell you what to do next.   Tips for weighing yourself    · Weigh yourself at the same time each  morning, wearing the same clothes. Weigh yourself after urinating and before eating.  · Use the same scale each day. Make sure the numbers are easy to read. Put the scale on a flat, hard surface -- not on a rug or carpet.  · Do not stop weighing yourself. If you forget one day, weigh again the next morning.  How to use your weight chart  · Keep your weight chart near the scale. Write your weight on the chart as soon as you get off the scale.  · Fill in the month and the start date on the chart. Then write down your weight each day. Your chart will look like this:    · If you miss a day, leave the space blank. Weigh yourself the next day and write your weight in the next space.  · Take your weight chart with you when you go to see your doctor.  Date Last Reviewed: 3/20/2016  © 3777-6474 [employer]. [address] [place] [address]. All rights reserved. This information is not intended as a substitute for professional medical care. Always follow your healthcare professional's instructions.              Heart Failure: Warning Signs of a Flare-Up  You have a condition called heart failure. Once you have heart failure, flare-ups can happen. Below are signs that can mean your heart failure is getting worse. If you notice any of these warning signs, call your healthcare provider.  Swelling    · Your feet, ankles, or lower legs get puffier.  · You notice skin changes on your lower legs.  · Your shoes feel too tight.  · Your clothes are tighter in the waist.  · You have trouble getting rings on or off your fingers.  Shortness of breath  · You have to breathe harder even when youre doing your normal activities or when youre resting.  · You are short of breath walking up stairs or even short distances.  · You wake up at night short of breath or coughing.  · You need to use more pillows or sit up to sleep.  · You wake up tired or restless.  Other warning signs  · You feel weaker, dizzy, or more  tired.  · You have chest pain or changes in your heartbeat.  · You have a cough that wont go away.  · You cant remember things or dont feel like eating.  Tracking your weight  Gaining weight is often the first warning sign that heart failure is getting worse. Gaining even a few pounds can be a sign that your body is retaining excess water and salt. Weighing yourself each day in the morning after you urinate and before you eat, is the best way to know if you're retaining water. Get a scale that is easy to read and make sure you wear the same clothes and use the same scale every time you weigh. Your healthcare provider will show you how to track your weight. Call your doctor if you gain more than 2 pounds in 1 day, 5 pounds in 1 week, or whatever weight gain you were told to report by your doctor. This is often a sign of worsening heart failure and needs to be evaluated and treated before it compromises your breathing. Your doctor will tell you what to do next.    Date Last Reviewed: 3/15/2016  © 1202-7010 [employer]. [address] [place] [address], [place] [address]. All rights reserved. This information is not intended as a substitute for professional medical care. Always follow your healthcare professional's instructions.              [place] Information [facility] complies with applicable Federal civil rights laws and does not discriminate on the basis of race, color, national origin, age, disability, or sex.

## 2017-03-02 VITALS
HEART RATE: 94 BPM | HEIGHT: 55 IN | OXYGEN SATURATION: 96 % | RESPIRATION RATE: 18 BRPM | BODY MASS INDEX: 26.76 KG/M2 | SYSTOLIC BLOOD PRESSURE: 133 MMHG | WEIGHT: 115.63 LBS | TEMPERATURE: 98 F | DIASTOLIC BLOOD PRESSURE: 75 MMHG

## 2017-03-02 PROBLEM — D75.839 THROMBOCYTOSIS: Status: ACTIVE | Noted: 2017-03-02

## 2017-03-02 PROBLEM — D72.829 LEUKOCYTOSIS: Status: ACTIVE | Noted: 2017-03-02

## 2017-03-02 PROBLEM — I50.9 ACUTE CONGESTIVE HEART FAILURE: Status: ACTIVE | Noted: 2017-03-02

## 2017-03-02 PROBLEM — L03.115 BILATERAL LOWER LEG CELLULITIS: Status: ACTIVE | Noted: 2017-03-02

## 2017-03-02 PROBLEM — D64.9 NORMOCYTIC ANEMIA: Status: ACTIVE | Noted: 2017-03-02

## 2017-03-02 PROBLEM — F17.220 CHEWING TOBACCO NICOTINE DEPENDENCE: Status: ACTIVE | Noted: 2017-03-02

## 2017-03-02 PROBLEM — L03.116 BILATERAL LOWER LEG CELLULITIS: Status: ACTIVE | Noted: 2017-03-02

## 2017-03-02 LAB
ALBUMIN SERPL BCP-MCNC: 4 G/DL
ALP SERPL-CCNC: 118 U/L
ALT SERPL W/O P-5'-P-CCNC: 8 U/L
ANION GAP SERPL CALC-SCNC: 9 MMOL/L
ANISOCYTOSIS BLD QL SMEAR: SLIGHT
AORTIC VALVE REGURGITATION: ABNORMAL
AST SERPL-CCNC: 16 U/L
BASOPHILS # BLD AUTO: 0.04 K/UL
BASOPHILS NFR BLD: 0.2 %
BILIRUB SERPL-MCNC: 1.2 MG/DL
BUN SERPL-MCNC: 13 MG/DL
CALCIUM SERPL-MCNC: 9.4 MG/DL
CHLORIDE SERPL-SCNC: 102 MMOL/L
CHOLEST/HDLC SERPL: 2.1 {RATIO}
CO2 SERPL-SCNC: 27 MMOL/L
CREAT SERPL-MCNC: 0.9 MG/DL
DIFFERENTIAL METHOD: ABNORMAL
EOSINOPHIL # BLD AUTO: 0.2 K/UL
EOSINOPHIL NFR BLD: 1.4 %
ERYTHROCYTE [DISTWIDTH] IN BLOOD BY AUTOMATED COUNT: 18.9 %
EST. GFR  (AFRICAN AMERICAN): >60 ML/MIN/1.73 M^2
EST. GFR  (NON AFRICAN AMERICAN): 58 ML/MIN/1.73 M^2
ESTIMATED AVG GLUCOSE: 114 MG/DL
ESTIMATED PA SYSTOLIC PRESSURE: 39.36
FERRITIN SERPL-MCNC: 93 NG/ML
FOLATE SERPL-MCNC: 8.9 NG/ML
GLUCOSE SERPL-MCNC: 107 MG/DL
HBA1C MFR BLD HPLC: 5.6 %
HCT VFR BLD AUTO: 41.9 %
HDL/CHOLESTEROL RATIO: 48.8 %
HDLC SERPL-MCNC: 40 MG/DL
HDLC SERPL-MCNC: 82 MG/DL
HGB BLD-MCNC: 12 G/DL
HYPOCHROMIA BLD QL SMEAR: ABNORMAL
IRON SERPL-MCNC: 28 UG/DL
LDLC SERPL CALC-MCNC: 27.8 MG/DL
LYMPHOCYTES # BLD AUTO: 0.9 K/UL
LYMPHOCYTES NFR BLD: 5 %
MAGNESIUM SERPL-MCNC: 2.4 MG/DL
MCH RBC QN AUTO: 24 PG
MCHC RBC AUTO-ENTMCNC: 28.6 %
MCV RBC AUTO: 84 FL
MITRAL VALVE REGURGITATION: ABNORMAL
MONOCYTES # BLD AUTO: 0.4 K/UL
MONOCYTES NFR BLD: 2.4 %
NEUTROPHILS # BLD AUTO: 15.9 K/UL
NEUTROPHILS NFR BLD: 91 %
NONHDLC SERPL-MCNC: 42 MG/DL
OVALOCYTES BLD QL SMEAR: ABNORMAL
PHOSPHATE SERPL-MCNC: 3.8 MG/DL
PLATELET # BLD AUTO: 476 K/UL
PLATELET BLD QL SMEAR: ABNORMAL
PMV BLD AUTO: 11 FL
POIKILOCYTOSIS BLD QL SMEAR: SLIGHT
POLYCHROMASIA BLD QL SMEAR: ABNORMAL
POTASSIUM SERPL-SCNC: 4.2 MMOL/L
PROT SERPL-MCNC: 7.1 G/DL
RBC # BLD AUTO: 5 M/UL
RETIRED EF AND QEF - SEE NOTES: 65 (ref 55–65)
SATURATED IRON: 6 %
SODIUM SERPL-SCNC: 138 MMOL/L
TOTAL IRON BINDING CAPACITY: 460 UG/DL
TRANSFERRIN SERPL-MCNC: 311 MG/DL
TRICUSPID VALVE REGURGITATION: ABNORMAL
TRIGL SERPL-MCNC: 71 MG/DL
TSH SERPL DL<=0.005 MIU/L-ACNC: 3.89 UIU/ML
VIT B12 SERPL-MCNC: 855 PG/ML
WBC # BLD AUTO: 17.56 K/UL

## 2017-03-02 PROCEDURE — 93306 TTE W/DOPPLER COMPLETE: CPT

## 2017-03-02 PROCEDURE — 84466 ASSAY OF TRANSFERRIN: CPT

## 2017-03-02 PROCEDURE — 84100 ASSAY OF PHOSPHORUS: CPT

## 2017-03-02 PROCEDURE — 83036 HEMOGLOBIN GLYCOSYLATED A1C: CPT

## 2017-03-02 PROCEDURE — 36415 COLL VENOUS BLD VENIPUNCTURE: CPT

## 2017-03-02 PROCEDURE — 85025 COMPLETE CBC W/AUTO DIFF WBC: CPT

## 2017-03-02 PROCEDURE — 82746 ASSAY OF FOLIC ACID SERUM: CPT

## 2017-03-02 PROCEDURE — 63600175 PHARM REV CODE 636 W HCPCS: Performed by: STUDENT IN AN ORGANIZED HEALTH CARE EDUCATION/TRAINING PROGRAM

## 2017-03-02 PROCEDURE — 80053 COMPREHEN METABOLIC PANEL: CPT

## 2017-03-02 PROCEDURE — 84443 ASSAY THYROID STIM HORMONE: CPT

## 2017-03-02 PROCEDURE — 82607 VITAMIN B-12: CPT

## 2017-03-02 PROCEDURE — 94761 N-INVAS EAR/PLS OXIMETRY MLT: CPT

## 2017-03-02 PROCEDURE — G0378 HOSPITAL OBSERVATION PER HR: HCPCS

## 2017-03-02 PROCEDURE — 83540 ASSAY OF IRON: CPT

## 2017-03-02 PROCEDURE — 83735 ASSAY OF MAGNESIUM: CPT

## 2017-03-02 PROCEDURE — 82728 ASSAY OF FERRITIN: CPT

## 2017-03-02 PROCEDURE — 80061 LIPID PANEL: CPT

## 2017-03-02 PROCEDURE — 25000003 PHARM REV CODE 250: Performed by: INTERNAL MEDICINE

## 2017-03-02 PROCEDURE — 25000003 PHARM REV CODE 250: Performed by: STUDENT IN AN ORGANIZED HEALTH CARE EDUCATION/TRAINING PROGRAM

## 2017-03-02 RX ORDER — DILTIAZEM HYDROCHLORIDE 240 MG/1
240 CAPSULE, COATED, EXTENDED RELEASE ORAL DAILY
Status: DISCONTINUED | OUTPATIENT
Start: 2017-03-02 | End: 2017-03-02 | Stop reason: HOSPADM

## 2017-03-02 RX ORDER — HYDROCODONE BITARTRATE AND ACETAMINOPHEN 7.5; 325 MG/1; MG/1
1 TABLET ORAL EVERY 6 HOURS PRN
Status: DISCONTINUED | OUTPATIENT
Start: 2017-03-02 | End: 2017-03-02 | Stop reason: HOSPADM

## 2017-03-02 RX ORDER — LOSARTAN POTASSIUM 25 MG/1
50 TABLET ORAL DAILY
Qty: 90 TABLET | Refills: 7 | Status: SHIPPED | OUTPATIENT
Start: 2017-03-02 | End: 2017-03-09 | Stop reason: SDUPTHER

## 2017-03-02 RX ORDER — FUROSEMIDE 40 MG/1
40 TABLET ORAL DAILY
Qty: 90 TABLET | Refills: 6 | Status: SHIPPED | OUTPATIENT
Start: 2017-03-02

## 2017-03-02 RX ORDER — FUROSEMIDE 10 MG/ML
40 INJECTION INTRAMUSCULAR; INTRAVENOUS DAILY
Status: DISCONTINUED | OUTPATIENT
Start: 2017-03-02 | End: 2017-03-02 | Stop reason: HOSPADM

## 2017-03-02 RX ORDER — ROSUVASTATIN CALCIUM 10 MG/1
10 TABLET, COATED ORAL DAILY
Status: DISCONTINUED | OUTPATIENT
Start: 2017-03-02 | End: 2017-03-02 | Stop reason: HOSPADM

## 2017-03-02 RX ORDER — HYDROXYUREA 500 MG/1
500 CAPSULE ORAL 2 TIMES DAILY
Status: DISCONTINUED | OUTPATIENT
Start: 2017-03-02 | End: 2017-03-02 | Stop reason: HOSPADM

## 2017-03-02 RX ORDER — CARVEDILOL 6.25 MG/1
6.25 TABLET ORAL 2 TIMES DAILY WITH MEALS
Qty: 60 TABLET | Refills: 11 | Status: ON HOLD | OUTPATIENT
Start: 2017-03-02 | End: 2017-06-03

## 2017-03-02 RX ORDER — LOSARTAN POTASSIUM 50 MG/1
50 TABLET ORAL DAILY
Status: DISCONTINUED | OUTPATIENT
Start: 2017-03-02 | End: 2017-03-02 | Stop reason: HOSPADM

## 2017-03-02 RX ADMIN — RIVAROXABAN 15 MG: 15 TABLET, FILM COATED ORAL at 08:03

## 2017-03-02 RX ADMIN — FUROSEMIDE 40 MG: 10 INJECTION, SOLUTION INTRAMUSCULAR; INTRAVENOUS at 08:03

## 2017-03-02 RX ADMIN — ROSUVASTATIN CALCIUM 10 MG: 10 TABLET, FILM COATED ORAL at 08:03

## 2017-03-02 RX ADMIN — DILTIAZEM HYDROCHLORIDE 240 MG: 240 CAPSULE, COATED, EXTENDED RELEASE ORAL at 08:03

## 2017-03-02 RX ADMIN — LOSARTAN POTASSIUM 50 MG: 50 TABLET, FILM COATED ORAL at 08:03

## 2017-03-02 RX ADMIN — HYDROXYUREA 500 MG: 500 CAPSULE ORAL at 08:03

## 2017-03-02 NOTE — ED NOTES
Pt informed that we will need to monitor output, and to inform staff upon need to urinate. Pt verbalized understanding.

## 2017-03-02 NOTE — PLAN OF CARE
Problem: Patient Care Overview  Goal: Plan of Care Review  Patient on RA, no respiratory distress noted. Will continue to monitor.   Outcome: Ongoing (interventions implemented as appropriate)  Pt. On room air in no apparent distress. Will cont. To monitor.

## 2017-03-02 NOTE — H&P
[facility] Internal Medicine History and Physical - Resident Note    Admitting Team: [facility] Internal Medicine Team A  Attending Physician: Dr. Parker  Resident: Aldo Dumont MD  Interns: Dr. Brooks and Dr. Lincoln    Date of Admit: 3/1/2017    Chief Complaint     Cough and Shortness of Breath x 2.5 weeks    Subjective:      History of Present Illness:  Angel Rincon is a 87 y.o. female who  has a past medical history of HTN, HLD, CAD s/p 3 stents (2013) and CABG in 2013, Afib, Polycythemia Vera, and Restless Leg Syndrome.       The patient was in their usual state of health until 2.5 weeks prior to admit when she began experiencing worsening SOB and cough on exertion.  She states that prior to this time, she was able to walk about 2 blocks without any issues.  For the past 2 weeks she states that she has barely been able to walk about 1/2 block before she becomes severely short of breath and has a continuous wet cough.  She states that she has been compliant with her lasix 20 mg PO daily but it has not helped to alleviate her symptoms.      She also complains of increased leg swelling for the past 2 weeks.  Denies any fevers, chills, N/V, chest pain, palpitations, PND, or abdominal pain.  States that she uses only 1 pillow to sleep at night.  Also, she states that she eats a lot of junk food but says that she does her best to adhere to a low Na diet.        Past Medical History:  Past Medical History:   Diagnosis Date    Angina of effort     Arthritis     Back pain     Cancer     Hyperlipidemia     Hypertension     Osteoporosis     Polycythemia     Restless leg        Past Surgical History:  Past Surgical History:   Procedure Laterality Date    ANGIOPLASTY      CORONARY ARTERY BYPASS GRAFT      HYSTERECTOMY      TONSILLECTOMY         Allergies:  Review of patient's allergies indicates:   Allergen Reactions    Citalopram      Other reaction(s): agitation    Codeine Nausea Only    Sulfa (sulfonamide  antibiotics)      Other reaction(s): Swelling    Xanax [alprazolam] Other (See Comments)     Unspecified reaction       Home Medications:  Prior to Admission medications    Medication Sig Start Date End Date Taking? Authorizing Provider   diltiazem (CARDIZEM CD) 240 MG 24 hr capsule  9/5/16  Yes Historical Provider, MD   furosemide (LASIX) 20 MG tablet Take 20 mg by mouth once daily. 5/11/16  Yes Historical Provider, MD   gabapentin (NEURONTIN) 100 MG capsule 1-2 tablets qHS. Stop if causing excess sedation 2/8/17  Yes Pino Estrada MD   hydroxyurea (HYDREA) 500 mg Cap TAKE 1 CAPSULE TWICE DAILY 5/4/16  Yes Terrance Ordaz MD   losartan (COZAAR) 25 MG tablet Take 25 mg by mouth once daily.   Yes Historical Provider, MD   potassium chloride SA (K-DUR,KLOR-CON) 20 MEQ tablet Take 1 tablet (20 mEq total) by mouth once daily. 12/13/16 12/13/17 Yes Pino Estrada MD   rivaroxaban (XARELTO) 20 mg Tab Take by mouth once daily.    Yes Historical Provider, MD   rosuvastatin (CRESTOR) 10 MG tablet Take 10 mg by mouth once daily.     Yes Historical Provider, MD   melatonin 10 mg Cap Take 10 mg by mouth every evening. 12/23/16   Xu Kidd MD   nitroGLYCERIN 0.4 MG/DOSE TL SPRY (NITROLINGUAL) 0.4 mg/dose spray  6/25/13   Historical Provider, MD   tramadol (ULTRAM) 50 mg tablet Take 1 tablet (50 mg total) by mouth every 8 (eight) hours as needed (severe pain only). 2nd tablet allowed if no relief at 1 hr 2/3/17 3/5/17  Pino Estrada MD       Family History:  Family History   Problem Relation Age of Onset    Adopted: Yes    Cancer Daughter      soft-tissue sarcoma    Melanoma Neg Hx        Social History:  Social History   Substance Use Topics    Smoking status: Never Smoker    Smokeless tobacco: Current User    Alcohol use 1.2 oz/week     2 Cans of beer per week       Review of Systems:  As per HPI.   All other systems are reviewed and are negative.    Health Maintaince :  "  Primary Care Physician: Dr. Estrada     Objective:   Last 24 Hour Vital Signs:  BP  Min: 145/66  Max: 163/79  Temp  Av.1 °F (36.7 °C)  Min: 98 °F (36.7 °C)  Max: 98.1 °F (36.7 °C)  Pulse  Av  Min: 77  Max: 95  Resp  Av  Min: 20  Max: 24  SpO2  Av.7 %  Min: 97 %  Max: 99 %  Height  Av' 7" (139.7 cm)  Min: 4' 7" (139.7 cm)  Max: 4' 7" (139.7 cm)  Weight  Av.1 kg (114 lb 12.8 oz)  Min: 51.7 kg (114 lb)  Max: 52.4 kg (115 lb 9.6 oz)  Body mass index is 26.87 kg/(m^2).       Physical Examination:  General:  Standing and walking around the room; NAD  HEENT:  PERRL, EOMI, oropharynx clear, neck supple, no LAD  CVS:  RRR, no murmurs  Resp:  Bibasilar crackles, no wheezes appreciated  Abdomen: swollen, +BS, soft, NT, ND  Ext: 2+ pitting edema of B/L LE up to knees, distal pulses 2+ and symmetric  Skin:  Chronic discoloration of B/L LE  Neuro:  AAO x 4, no focal deficits    Laboratory:  Most Recent Data:  CBC: Lab Results   Component Value Date    WBC 17.73 (H) 2017    HGB 11.0 (L) 2017    HCT 38.7 2017     (H) 2017    MCV 84 2017    RDW 19.0 (H) 2017     BMP: Lab Results   Component Value Date     2017    K 3.8 2017     2017    CO2 25 2017    BUN 15 2017    CREATININE 0.9 2017     2017    CALCIUM 9.1 2017    MG 2.1 2017    PHOS 2.6 (L) 2007     LFTs: Lab Results   Component Value Date    PROT 6.7 2017    ALBUMIN 3.8 2017    BILITOT 1.0 2017    AST 14 2017    ALKPHOS 110 2017    ALT 8 (L) 2017     Coags:   Lab Results   Component Value Date    INR 1.1 2016     FLP: Lab Results   Component Value Date    CHOL 100 (L) 2014    HDL 52 2014    LDLCALC 33.0 (L) 2014    TRIG 75 2014    CHOLHDL 52.0 (H) 2014     DM: Lab Results   Component Value Date    HGBA1C 5.8 2016    LDLCALC 33.0 (L) 2014    " CREATININE 0.9 03/01/2017     Thyroid: Lab Results   Component Value Date    TSH 1.545 06/02/2016     Anemia: Lab Results   Component Value Date    IRON 33 05/24/2016    TIBC 420 05/24/2016    FERRITIN 29 05/24/2016    BUSDIHOB51 753 08/18/2016    FOLATE 9.3 08/18/2016     Cardiac: Lab Results   Component Value Date    TROPONINI 0.010 03/01/2017     (H) 03/01/2017     Urinalysis: Lab Results   Component Value Date    LABURIN  06/14/2016     Multiple organisms isolated. None in predominance.  Repeat if    LABURIN clinically necessary. 06/14/2016    COLORU Yellow 06/14/2016    SPECGRAV 1.010 06/14/2016    NITRITE Negative 06/14/2016    PROTEINUR 30 01/13/2015    KETONESU Negative 06/14/2016    UROBILINOGEN Negative 06/14/2016    BILIRUBINUR negative 01/13/2015    WBCUA 30 (H) 06/14/2016       Trended Lab Data:    Recent Labs  Lab 03/01/17  2152   WBC 17.73*   HGB 11.0*   HCT 38.7   *   MCV 84   RDW 19.0*      K 3.8      CO2 25   BUN 15   CREATININE 0.9      PROT 6.7   ALBUMIN 3.8   BILITOT 1.0   AST 14   ALKPHOS 110   ALT 8*       Trended Cardiac Data:    Recent Labs  Lab 03/01/17 2152   TROPONINI 0.010   *       Other Results:  EKG (my interpretation): no evidence of acute ischemia    Radiology:  Imaging Results         X-Ray Chest PA And Lateral (Final result) Result time:  03/01/17 23:10:54    Final result by Ramon Diaz MD (03/01/17 23:10:54)    Impression:         Cardiomegaly with mild congestive changes.          Electronically signed by: RAMON DIAZ MD  Date:     03/01/17  Time:    23:10     Narrative:    Chest PA and Lateral    Indication:.    Comparison:October 31, 2013.    Findings:     The cardiomediastinal silhouette is enlarged with pulmonary vascular congestion.  Sternotomy wires present. There is no pleural effusion.  The trachea is midline.  The lungs are symmetrically expanded bilaterally with mild bilateral interstitial edema.  There is no  pneumothorax.  The osseous structures appear unchanged.            Assessment:     Angel Rincon is a 87 y.o. female with:  Patient Active Problem List    Diagnosis Date Noted    Acute congestive heart failure 03/02/2017    Shaking spells     Coagulation defect 12/12/2016    Rheumatoid factor positive with cyclic citrullinated peptide (CCP) antibody negative 08/18/2016    Lumbar radiculopathy 07/18/2016    Peripheral polyneuropathy 07/18/2016    Chronic insomnia 07/07/2016    Chronic alcoholism in remission 06/14/2016    Chronic diarrhea 06/14/2016    Peripheral arterial disease 06/04/2016    Atrial fibrillation, controlled 06/04/2016    Polycythemia vera 06/04/2016    Restless leg syndrome 05/20/2015    Acute pain of left hip 02/03/2015    Back pain 01/13/2015    UTI (lower urinary tract infection) 01/13/2015    HLD (hyperlipidemia) 08/19/2014    S/P CABG (coronary artery bypass graft) 08/14/2013    HTN (hypertension) 08/14/2013        Plan:     Acute Shortness of Breath 2/2 New Onset Heart Failure  - pt presents with SOB, cough, and increased leg swelling x 2.5 weeks  - CXR with cardiomegaly and mild pulmonary vascular congestion  -   - received Lasix 40 mg IV x 1 in ED with good response  - start lasix 40 mg IV daily  - continue home ARB and statin  - strict I/Os  - 1.5 L fluid restriction   - daily weights  - low Na diet  - Echo pending    Afib   - CHADS2-VASC Score of 5  - continue home Diltiazem and Xarelto    HTN   - BP stable  - continue Losartan  - will monitor BP and adjust antihypertensive regimen to achieve lowest tolerated BP    Polycythemia Vera  - H/H stable  - continue Hydroxyurea    Normocytic Anemia  - H/H on admit is 11/38.7  - iron studies pending  - continue to monitor    H/O CAD s/p Stents x 3, H/O CABG  - denies chest pain  - continue home statin, ARB    HLD  - Lipid panel pending  - continue home statin    Leg Pain with a history of restless leg syndrome  - follows  with Pain Management, Dr. Haley  - continue Norco PRN per Dr. Haley's recommendations    Chewing Tobacco Use  - patient states that she has been dipping with snuff since she was a teenager  - counseled on the risks of this behavior such as oral cancer and thus the importance of cessation    Leukocytosis and Thrombocytosis  - WBC on presentation was 17.73 and platelets 451  - pt afebrile and vitals stable; no s/s of systemic infection  - likely reactive in the setting of new onset heart failure  - will continue to monitor for improvement; will have low threshold to initiate antibiotic therapy if patient clinically decompensates    Diet:  Low Na, Cardiac with 1.5 L fluid restriction    DVT ppx:  Continue home Xarelto; TEDs    Code Status:     Full    Aldo Dumont  [facility] Internal [facility] HO-4  [facility] Hospital Medicine Service    [facility] Medicine Hospitalist Pager numbers:   [facility] Hospitalist Medicine Team A (Elena/Riri): [phone]  [facility] Hospitalist Medicine Team B (Angelo/Bradley):  [phone]

## 2017-03-02 NOTE — ED NOTES
"Pt/daughter reports that pt has been having a "wet" nonproductive cough with SOB x 2 weeks. Denies fever, N/V, and CP. +3 pitting edema noted to BLE. +2 pedal pulses bilaterally. States that she recently started taking Norco prior to swelling beginning. NAD, VSS.    APPEARANCE: Alert, oriented and in no acute distress.  CARDIAC: Normal rate and rhythm, no murmur heard.   PERIPHERAL VASCULAR: peripheral pulses present. Normal cap refill. No edema. Warm to touch.    RESPIRATORY:Normal rate and effort. Respirations are equal and unlabored no obvious signs of distress.  GASTRO: soft, bowel sounds normal, no tenderness, no abdominal distention.  MUSC: Full ROM. No bony tenderness or soft tissue tenderness. No obvious deformity.  SKIN: Skin is warm and dry, normal skin turgor, mucous membranes moist.  NEURO: 5/5 strength major flexors/extensors bilaterally. Sensory intact to light touch bilaterally. Vassar coma scale: eyes open spontaneously-4, oriented & converses-5, obeys commands-6. No neurological abnormalities.   MENTAL STATUS: awake, alert and aware of environment.  EYE: PERRL, both eyes: pupils brisk and reactive to light. Normal size.  ENT: EARS: no obvious drainage. NOSE: no active bleeding.          "

## 2017-03-02 NOTE — PLAN OF CARE
Future Appointments  Date Time Provider Department Center   3/23/2017 10:40 AM Pino Estrada MD [facility] [place]   4/6/2017 2:20 PM Caitlyn Loyola MD [facility] [place]        03/02/17 1110   Discharge Assessment   Assessment Type Discharge Planning Assessment   Confirmed/corrected address and phone number on facesheet? Yes   Assessment information obtained from? Patient   Communicated expected length of stay with patient/caregiver yes   If Healthcare Directive is received, is it scanned into Epic? no (comment)   Prior to hospitilization cognitive status: Alert/Oriented   Current cognitive status: Alert/Oriented   Current Functional Status: Independent   Arrived From home or self-care   Lives With alone   Able to Return to Prior Arrangements yes   Is patient able to care for self after discharge? Yes   How many people do you have in your home that can help with your care after discharge? 0   Patient's perception of discharge disposition home or selfcare   Readmission Within The Last 30 Days no previous admission in last 30 days   If YES, was patient admitted for the same reason? No   Patient currently being followed by outpatient case management? No   Does the patient currently use HME? No   Equipment Currently Used at Home none   Do you have any problems affording any of your prescribed medications? No   Is the patient taking medications as prescribed? yes   Do you have any financial concerns preventing you from receiving the healthcare you need? No   Does the patient have transportation to healthcare appointments? Yes   Transportation Available family or friend will provide   On Dialysis? No   Does the patient receive services at the Coumadin Clinic? No   Are there any open cases? No   Discharge Plan A Home;Home with family   Discharge Plan B Home;Home with family   Patient/Family In Agreement With Plan yes     Ann Kuo RN, [facility], CMSRN  RN Transition Navigator  [phone]

## 2017-03-02 NOTE — ED NOTES
Dr. Rowland at BS. Report received. Care assumed. Pt AAOx4. Respirations even and unlabored. Pt VSS. Will continue to monitor.

## 2017-03-02 NOTE — PLAN OF CARE
Future Appointments  Date Time Provider Department Center   3/7/2017 2:00 PM Romi Rogers MD [facility]   3/23/2017 10:40 AM Pino Estrada MD [facility] [place]   4/6/2017 2:20 PM Caitlyn Loyola MD [facility] [place]     Priority care appt set.       03/02/17 1628   Final Note   Assessment Type Final Discharge Note   Discharge Disposition Home   Discharge planning education complete? Yes   Hospital Follow Up  Appt(s) scheduled? Yes   Discharge plans and expectations educations in teach back method with documentation complete? Yes   Offered [facility] -- Bedside Delivery? n/a   Discharge/Hospital Encounter Summary to (non-[facility]) PCP No   Referral to Outpatient Case Management complete? No   Referral to / orders for Home Health Complete? No   30 day supply of medicines given at discharge, if documented non-compliance / non-adherence? No   Any social issues identified prior to discharge? No   Did you assess the readiness or willingness of the family or caregiver to support self management of care? No   Right Care Referral Info   Post Acute Recommendation No Care     Ann Kuo, RN, CCM, CMSRN  RN Transition Navigator  [phone]

## 2017-03-02 NOTE — PLAN OF CARE
Problem: Patient Care Overview  Goal: Plan of Care Review  Patient on RA, no respiratory distress noted. Will continue to monitor.   Outcome: Ongoing (interventions implemented as appropriate)  Pt is AAOx4, NAD noted, VSS. Denies pain or SOB. Medications administered per MAR, pt tolerated well. Telemetry monitor in place, no ectopy noted. Safety maintained, bed in lowest position, wheels locked, call light within reach. Will continue to monitor.

## 2017-03-02 NOTE — PROGRESS NOTES
Pt given discharge instructions and Rx, given opportunity to ask questions. No questions asked, verbalized understanding of discharge instructions. IV removed per protocol, pt tolerated well. Telemetry monitor removed and returned. Pt discharged home with belongings, accompanied by family.

## 2017-03-04 NOTE — DISCHARGE SUMMARY
[facility] Medicine Discharge Summary    Primary Team: [facility] Medicine Team A  Attending Physician: Dr. Parker  Resident: Dr. Dumont  Intern: Dr. Brooks    Date of Admit: 3/1/2017  Date of Discharge: 3/2/2017    Discharge to: Home  Condition: Stable    Discharge Diagnoses     - Acute Shortness of Breath 2/2 Volume Overload in the setting of Acute on Chronic Diastolic Heart Failure  - Afib  - HTN  - Polycythemia Vera  - Normocytic Anemia  - H/O CAD s/p Stents x 3, h/o CABG  - HLD  - Leg Pain with a history of Restless Leg Syndrome  - Chewing Tobacco Use  - Leukocytosis  - Thrombocytosis      Consultants and Procedures     Consultants:  None    Procedures:   None    Brief History of Present Illness   Angel Rincon is a 87 y.o. female who  has a past medical history of HTN, HLD, CAD s/p 3 stents (2013) and CABG in 2013, Afib, Polycythemia Vera, and Restless Leg Syndrome.      The patient was in their usual state of health until 2.5 weeks prior to admit when she began experiencing worsening SOB and cough on exertion. She stated that prior to this time, she was able to walk about 2 blocks without any issues. For the past 2 weeks she stated that she was unable to walk about 1/2 block before she became severely short of breath and had a continuous wet cough. She stated that she was compliant with her lasix 20 mg PO daily but it had not helped to alleviate her symptoms.      For the full HPI please refer to the History & Physical from this admission.    Hospital Course By Problem with Pertinent Findings     Acute Shortness of Breath 2/2 Volume Overload in the setting of acute on chronic diastolic Heart Failure  - pt presented with SOB, cough, and increased leg swelling x 2.5 weeks  - CXR with cardiomegaly and mild pulmonary vascular congestion  -   - Echo on 3/2/2017 shows:    1 - Biatrial enlargement.      2 - Concentric hypertrophy.      3 - Normal left ventricular systolic function (EF 60-65%).      4 -  Right ventricular enlargement with normal systolic function.      5 - The estimated PA systolic pressure is 39 mmHg.      6 - Mild aortic regurgitation.      7 - Mild mitral regurgitation.      8 - Moderate to severe tricuspid regurgitation.      9 - Intermediate central venous pressure     - received Lasix 40 mg IV x 1 in ED with good response  - started on lasix 40 mg IV daily while inpatient and diuresed well.    - she was continued on home ARB and statin  - she was kept on a low Na diet during her hospital stay  - at discharge, patient's lasix was increased to 40 mg PO daily and Losartan was increased to 50 mg PO daily  - patient was also started on Coreg 6.25 mg PO BID at discharge  - she will f/u with her cardiologist at [facility] within 2 weeks of discharge     Afib   - CHADS2-VASC Score of 5  - continued home Diltiazem and Xarelto during her hospitalization and at discharge     HTN   - BP stable  - continued Losartan during her hospital stay and increased Losartan to 50 mg PO daily at discharge     Polycythemia Vera  - H/H stable  - continued Hydroxyurea during her hospital stay and at discharge  - patient will f/u with her hematologist within 2 weeks of discharge     Normocytic Anemia  - H/H on admit 11/38.7  - iron studies with ferritin 93, iron 28, TIBC 460      H/O CAD s/p Stents x 3, H/O CABG  - denied chest pain  - continued home statin, ARB     HLD  - Lipid panel with LDL 28, HDL 40, total cholesterol 82  - continued home statin     Leg Pain with a history of restless leg syndrome  - follows with Pain Management, Dr. Haley  - continued Norco PRN per Dr. Haley's recommendations     Chewing Tobacco Use  - patient stated that she has been dipping with snuff since she was a teenager  - counseled on the risks of this behavior such as oral cancer and thus the importance of cessation     Leukocytosis and Thrombocytosis  - WBC on presentation was 17.73 and platelets 451  - pt afebrile and vitals stable  - never  received abx therapy as patient remained clinically stable and without s/s of infection during her hospitalization    Discharge Medications        Medication List      START taking these medications          carvedilol 6.25 MG tablet   Commonly known as:  COREG   Take 1 tablet (6.25 mg total) by mouth 2 (two) times daily with meals.         CHANGE how you take these medications          furosemide 40 MG tablet   Commonly known as:  LASIX   Take 1 tablet (40 mg total) by mouth once daily.   What changed:    - medication strength  - how much to take       losartan 25 MG tablet   Commonly known as:  COZAAR   Take 2 tablets (50 mg total) by mouth once daily.   What changed:  how much to take         CONTINUE taking these medications          diltiaZEM 240 MG 24 hr capsule   Commonly known as:  CARDIZEM CD       gabapentin 100 MG capsule   Commonly known as:  NEURONTIN   1-2 tablets qHS. Stop if causing excess sedation       hydroxyurea 500 mg Cap   Commonly known as:  HYDREA   TAKE 1 CAPSULE TWICE DAILY       melatonin 10 mg Cap   Take 10 mg by mouth every evening.       nitroGLYCERIN 0.4 MG/DOSE TL SPRY 400 mcg/spray spray   Commonly known as:  NITROLINGUAL       potassium chloride SA 20 MEQ tablet   Commonly known as:  K-DUR,KLOR-CON   Take 1 tablet (20 mEq total) by mouth once daily.       rosuvastatin 10 MG tablet   Commonly known as:  CRESTOR       tramadol 50 mg tablet   Commonly known as:  ULTRAM   Take 1 tablet (50 mg total) by mouth every 8 (eight) hours as needed (severe pain only). 2nd tablet allowed if no relief at 1 hr       XARELTO 20 mg Tab   Generic drug:  rivaroxaban            Where to Get Your Medications      You can get these medications from any pharmacy     Bring a paper prescription for each of these medications     carvedilol 6.25 MG tablet    furosemide 40 MG tablet    losartan 25 MG tablet             Discharge Information:   Diet:  Low Na, Cardiac    Physical Activity:  As  tolerated    Instructions:  1. Take all medications as prescribed  2. Keep all follow-up appointments  3. Return to the hospital or call your primary care physicians if any worsening symptoms such as Temp >100.4F, chest pain, severe shortness of breath, confusion, lethargy, or any other concerns occur.    Follow-Up Appointments:  Follow-up Information     Follow up with Pino Estrada MD. Schedule an appointment as soon as possible for a visit in 2 weeks.    Specialty:  Internal Medicine    Contact information:    [address] [place] [address] [phone]          Follow up with Chuck Arceo MD. Schedule an appointment as soon as possible for a visit in 2 weeks.    Specialty:  Cardiology    Contact information:    [facility] [facility] [address]  [phone]          Aldo Dumont  [facility] Internal Medicine, HO[facility]

## 2017-03-04 NOTE — PROGRESS NOTES
[facility] Medicine Discharge Summary    Primary Team: [facility] Medicine Team A  Attending Physician: Dr. Parker  Resident: Dr. Dumont  Intern: Dr. Brooks    Date of Admit: 3/1/2017  Date of Discharge: 3/2/2017    Discharge to: Home  Condition: Stable    Discharge Diagnoses     - Acute Shortness of Breath 2/2 Volume Overload in the setting of Acute on Chronic Diastolic Heart Failure  - Afib  - HTN  - Polycythemia Vera  - Normocytic Anemia  - H/O CAD s/p Stents x 3, h/o CABG  - HLD  - Leg Pain with a history of Restless Leg Syndrome  - Chewing Tobacco Use  - Leukocytosis  - Thrombocytosis      Consultants and Procedures     Consultants:  None    Procedures:   None    Brief History of Present Illness   Angel Rincon is a 87 y.o. female who  has a past medical history of HTN, HLD, CAD s/p 3 stents (2013) and CABG in 2013, Afib, Polycythemia Vera, and Restless Leg Syndrome.      The patient was in their usual state of health until 2.5 weeks prior to admit when she began experiencing worsening SOB and cough on exertion. She stated that prior to this time, she was able to walk about 2 blocks without any issues. For the past 2 weeks she stated that she was unable to walk about 1/2 block before she became severely short of breath and had a continuous wet cough. She stated that she was compliant with her lasix 20 mg PO daily but it had not helped to alleviate her symptoms.      For the full HPI please refer to the History & Physical from this admission.    Hospital Course By Problem with Pertinent Findings     Acute Shortness of Breath 2/2 Volume Overload in the setting of acute on chronic diastolic Heart Failure  - pt presented with SOB, cough, and increased leg swelling x 2.5 weeks  - CXR with cardiomegaly and mild pulmonary vascular congestion  -   - Echo on 3/2/2017 shows:    1 - Biatrial enlargement.      2 - Concentric hypertrophy.      3 - Normal left ventricular systolic function (EF 60-65%).      4 -  Right ventricular enlargement with normal systolic function.      5 - The estimated PA systolic pressure is 39 mmHg.      6 - Mild aortic regurgitation.      7 - Mild mitral regurgitation.      8 - Moderate to severe tricuspid regurgitation.      9 - Intermediate central venous pressure     - received Lasix 40 mg IV x 1 in ED with good response  - started on lasix 40 mg IV daily while inpatient and diuresed well.    - she was continued on home ARB and statin  - she was kept on a low Na diet during her hospital stay  - at discharge, patient's lasix was increased to 40 mg PO daily and Losartan was increased to 50 mg PO daily  - patient was also started on Coreg 6.25 mg PO BID at discharge  - she will f/u with her cardiologist at [facility] within 2 weeks of discharge     Afib   - CHADS2-VASC Score of 5  - continued home Diltiazem and Xarelto during her hospitalization and at discharge     HTN   - BP stable  - continued Losartan during her hospital stay and increased Losartan to 50 mg PO daily at discharge     Polycythemia Vera  - H/H stable  - continued Hydroxyurea during her hospital stay and at discharge  - patient will f/u with her hematologist within 2 weeks of discharge     Normocytic Anemia  - H/H on admit 11/38.7  - iron studies with ferritin 93, iron 28, TIBC 460      H/O CAD s/p Stents x 3, H/O CABG  - denied chest pain  - continued home statin, ARB     HLD  - Lipid panel with LDL 28, HDL 40, total cholesterol 82  - continued home statin     Leg Pain with a history of restless leg syndrome  - follows with Pain Management, Dr. Haley  - continued Norco PRN per Dr. Haley's recommendations     Chewing Tobacco Use  - patient stated that she has been dipping with snuff since she was a teenager  - counseled on the risks of this behavior such as oral cancer and thus the importance of cessation     Leukocytosis and Thrombocytosis  - WBC on presentation was 17.73 and platelets 451  - pt afebrile and vitals stable  - never  received abx therapy as patient remained clinically stable and without s/s of infection during her hospitalization    Discharge Medications        Medication List      START taking these medications          carvedilol 6.25 MG tablet   Commonly known as:  COREG   Take 1 tablet (6.25 mg total) by mouth 2 (two) times daily with meals.         CHANGE how you take these medications          furosemide 40 MG tablet   Commonly known as:  LASIX   Take 1 tablet (40 mg total) by mouth once daily.   What changed:    - medication strength  - how much to take       losartan 25 MG tablet   Commonly known as:  COZAAR   Take 2 tablets (50 mg total) by mouth once daily.   What changed:  how much to take         CONTINUE taking these medications          diltiaZEM 240 MG 24 hr capsule   Commonly known as:  CARDIZEM CD       gabapentin 100 MG capsule   Commonly known as:  NEURONTIN   1-2 tablets qHS. Stop if causing excess sedation       hydroxyurea 500 mg Cap   Commonly known as:  HYDREA   TAKE 1 CAPSULE TWICE DAILY       melatonin 10 mg Cap   Take 10 mg by mouth every evening.       nitroGLYCERIN 0.4 MG/DOSE TL SPRY 400 mcg/spray spray   Commonly known as:  NITROLINGUAL       potassium chloride SA 20 MEQ tablet   Commonly known as:  K-DUR,KLOR-CON   Take 1 tablet (20 mEq total) by mouth once daily.       rosuvastatin 10 MG tablet   Commonly known as:  CRESTOR       tramadol 50 mg tablet   Commonly known as:  ULTRAM   Take 1 tablet (50 mg total) by mouth every 8 (eight) hours as needed (severe pain only). 2nd tablet allowed if no relief at 1 hr       XARELTO 20 mg Tab   Generic drug:  rivaroxaban            Where to Get Your Medications      You can get these medications from any pharmacy     Bring a paper prescription for each of these medications     carvedilol 6.25 MG tablet    furosemide 40 MG tablet    losartan 25 MG tablet             Discharge Information:   Diet:  Low Na, Cardiac    Physical Activity:  As  tolerated    Instructions:  1. Take all medications as prescribed  2. Keep all follow-up appointments  3. Return to the hospital or call your primary care physicians if any worsening symptoms such as Temp >100.4F, chest pain, severe shortness of breath, confusion, lethargy, or any other concerns occur.    Follow-Up Appointments:  Follow-up Information     Follow up with Pino Estrada MD. Schedule an appointment as soon as possible for a visit in 2 weeks.    Specialty:  Internal Medicine    Contact information:    [address] [place] [address] [phone]          Follow up with Chuck Arceo MD. Schedule an appointment as soon as possible for a visit in 2 weeks.    Specialty:  Cardiology    Contact information:    [facility] [facility] [address]  [phone]          Aldo Dumont  [facility] Internal [facility], [facility]

## 2017-03-06 DIAGNOSIS — T50.2X5A DIURETIC-INDUCED HYPOKALEMIA: ICD-10-CM

## 2017-03-06 DIAGNOSIS — E87.6 DIURETIC-INDUCED HYPOKALEMIA: ICD-10-CM

## 2017-03-06 RX ORDER — POTASSIUM CHLORIDE 20 MEQ/1
20 TABLET, EXTENDED RELEASE ORAL DAILY
Qty: 90 TABLET | Refills: 1 | Status: SHIPPED | OUTPATIENT
Start: 2017-03-06 | End: 2017-03-09 | Stop reason: ALTCHOICE

## 2017-03-06 NOTE — TELEPHONE ENCOUNTER
----- Message from Bernice Garcia sent at 3/6/2017 10:24 AM CST -----  Contact: [phone]/ Nori pt's daughter   Pt's daughter its requesting a refill on rx potasium . Pt's daughter states pt's potasium pills are really big and she needs smaller pills  . Please advise

## 2017-03-07 ENCOUNTER — TELEPHONE (OUTPATIENT)
Dept: PRIMARY CARE CLINIC | Facility: CLINIC | Age: 82
End: 2017-03-07

## 2017-03-07 NOTE — TELEPHONE ENCOUNTER
Rec'd call from pt's daughter 'Nori', requesting for appt to be rescheduled to Thurs. 3/9 for 11. Instructed labs at 10, stated understanding.

## 2017-03-07 NOTE — TELEPHONE ENCOUNTER
Call placed to pt as a reminder of appointment at 2 and to have labs drawn at 1 prior to clinic visit. Pt stated understanding and agreed to comply.

## 2017-03-09 ENCOUNTER — LAB VISIT (OUTPATIENT)
Dept: LAB | Facility: HOSPITAL | Age: 82
End: 2017-03-09
Attending: INTERNAL MEDICINE
Payer: MEDICARE

## 2017-03-09 ENCOUNTER — TELEPHONE (OUTPATIENT)
Dept: PRIMARY CARE CLINIC | Facility: CLINIC | Age: 82
End: 2017-03-09

## 2017-03-09 ENCOUNTER — OFFICE VISIT (OUTPATIENT)
Dept: PRIMARY CARE CLINIC | Facility: CLINIC | Age: 82
End: 2017-03-09
Payer: MEDICARE

## 2017-03-09 VITALS
BODY MASS INDEX: 27.41 KG/M2 | DIASTOLIC BLOOD PRESSURE: 65 MMHG | OXYGEN SATURATION: 99 % | HEART RATE: 80 BPM | WEIGHT: 117.94 LBS | TEMPERATURE: 98 F | SYSTOLIC BLOOD PRESSURE: 123 MMHG

## 2017-03-09 DIAGNOSIS — F41.9 ANXIETY: ICD-10-CM

## 2017-03-09 DIAGNOSIS — N30.00 ACUTE CYSTITIS WITHOUT HEMATURIA: ICD-10-CM

## 2017-03-09 DIAGNOSIS — M54.9 COSTOVERTEBRAL ANGLE TENDERNESS: ICD-10-CM

## 2017-03-09 DIAGNOSIS — I10 ESSENTIAL HYPERTENSION: ICD-10-CM

## 2017-03-09 DIAGNOSIS — I50.9 CONGESTIVE HEART FAILURE, UNSPECIFIED CONGESTIVE HEART FAILURE CHRONICITY, UNSPECIFIED CONGESTIVE HEART FAILURE TYPE: Primary | ICD-10-CM

## 2017-03-09 DIAGNOSIS — I50.9 CONGESTIVE HEART FAILURE, UNSPECIFIED CONGESTIVE HEART FAILURE CHRONICITY, UNSPECIFIED CONGESTIVE HEART FAILURE TYPE: ICD-10-CM

## 2017-03-09 DIAGNOSIS — I48.20 CHRONIC ATRIAL FIBRILLATION: ICD-10-CM

## 2017-03-09 DIAGNOSIS — I50.32 CHRONIC DIASTOLIC HEART FAILURE: ICD-10-CM

## 2017-03-09 DIAGNOSIS — D45 POLYCYTHEMIA VERA: ICD-10-CM

## 2017-03-09 DIAGNOSIS — R19.8 INCREASED ABDOMINAL GIRTH: ICD-10-CM

## 2017-03-09 DIAGNOSIS — I36.1 NON-RHEUMATIC TRICUSPID VALVE INSUFFICIENCY: ICD-10-CM

## 2017-03-09 DIAGNOSIS — N20.0 NEPHROLITHIASIS: ICD-10-CM

## 2017-03-09 DIAGNOSIS — I50.33 ACUTE ON CHRONIC DIASTOLIC HEART FAILURE: Primary | ICD-10-CM

## 2017-03-09 DIAGNOSIS — Z79.01 CHRONIC ANTICOAGULATION: ICD-10-CM

## 2017-03-09 LAB
ANION GAP SERPL CALC-SCNC: 10 MMOL/L
ANISOCYTOSIS BLD QL SMEAR: ABNORMAL
BASOPHILS # BLD AUTO: ABNORMAL K/UL
BASOPHILS NFR BLD: 0 %
BNP SERPL-MCNC: 560 PG/ML
BUN SERPL-MCNC: 20 MG/DL
CALCIUM SERPL-MCNC: 9.2 MG/DL
CHLORIDE SERPL-SCNC: 102 MMOL/L
CO2 SERPL-SCNC: 25 MMOL/L
CREAT SERPL-MCNC: 1.1 MG/DL
DACRYOCYTES BLD QL SMEAR: ABNORMAL
DIFFERENTIAL METHOD: ABNORMAL
EOSINOPHIL # BLD AUTO: ABNORMAL K/UL
EOSINOPHIL NFR BLD: 0 %
ERYTHROCYTE [DISTWIDTH] IN BLOOD BY AUTOMATED COUNT: 19 %
EST. GFR  (AFRICAN AMERICAN): 52 ML/MIN/1.73 M^2
EST. GFR  (NON AFRICAN AMERICAN): 45 ML/MIN/1.73 M^2
GLUCOSE SERPL-MCNC: 104 MG/DL
HCT VFR BLD AUTO: 39.7 %
HGB BLD-MCNC: 11.3 G/DL
HYPOCHROMIA BLD QL SMEAR: ABNORMAL
LYMPHOCYTES # BLD AUTO: ABNORMAL K/UL
LYMPHOCYTES NFR BLD: 5 %
MCH RBC QN AUTO: 23.7 PG
MCHC RBC AUTO-ENTMCNC: 28.5 %
MCV RBC AUTO: 83 FL
MONOCYTES # BLD AUTO: ABNORMAL K/UL
MONOCYTES NFR BLD: 1 %
NEUTROPHILS NFR BLD: 88 %
NEUTS BAND NFR BLD MANUAL: 6 %
OVALOCYTES BLD QL SMEAR: ABNORMAL
PLATELET # BLD AUTO: 524 K/UL
PLATELET BLD QL SMEAR: ABNORMAL
PMV BLD AUTO: 10.2 FL
POIKILOCYTOSIS BLD QL SMEAR: ABNORMAL
POLYCHROMASIA BLD QL SMEAR: ABNORMAL
POTASSIUM SERPL-SCNC: 4.8 MMOL/L
RBC # BLD AUTO: 4.77 M/UL
SODIUM SERPL-SCNC: 137 MMOL/L
WBC # BLD AUTO: 17.25 K/UL

## 2017-03-09 PROCEDURE — 85027 COMPLETE CBC AUTOMATED: CPT

## 2017-03-09 PROCEDURE — 1157F ADVNC CARE PLAN IN RCRD: CPT | Mod: S$GLB,,, | Performed by: INTERNAL MEDICINE

## 2017-03-09 PROCEDURE — 99215 OFFICE O/P EST HI 40 MIN: CPT | Mod: S$GLB,,, | Performed by: INTERNAL MEDICINE

## 2017-03-09 PROCEDURE — 1160F RVW MEDS BY RX/DR IN RCRD: CPT | Mod: S$GLB,,, | Performed by: INTERNAL MEDICINE

## 2017-03-09 PROCEDURE — 85007 BL SMEAR W/DIFF WBC COUNT: CPT

## 2017-03-09 PROCEDURE — 80048 BASIC METABOLIC PNL TOTAL CA: CPT

## 2017-03-09 PROCEDURE — 1126F AMNT PAIN NOTED NONE PRSNT: CPT | Mod: S$GLB,,, | Performed by: INTERNAL MEDICINE

## 2017-03-09 PROCEDURE — 99999 PR PBB SHADOW E&M-EST. PATIENT-LVL III: CPT | Mod: PBBFAC,,, | Performed by: INTERNAL MEDICINE

## 2017-03-09 PROCEDURE — 83880 ASSAY OF NATRIURETIC PEPTIDE: CPT

## 2017-03-09 PROCEDURE — 1159F MED LIST DOCD IN RCRD: CPT | Mod: S$GLB,,, | Performed by: INTERNAL MEDICINE

## 2017-03-09 PROCEDURE — 36415 COLL VENOUS BLD VENIPUNCTURE: CPT

## 2017-03-09 PROCEDURE — 99499 UNLISTED E&M SERVICE: CPT | Mod: S$GLB,,, | Performed by: INTERNAL MEDICINE

## 2017-03-09 RX ORDER — CEFUROXIME AXETIL 500 MG/1
500 TABLET ORAL 2 TIMES DAILY
COMMUNITY
End: 2017-03-23 | Stop reason: ALTCHOICE

## 2017-03-09 RX ORDER — LOSARTAN POTASSIUM 50 MG/1
50 TABLET ORAL DAILY
Status: ON HOLD
Start: 2017-03-09 | End: 2017-06-03 | Stop reason: HOSPADM

## 2017-03-09 RX ORDER — DULOXETIN HYDROCHLORIDE 30 MG/1
30 CAPSULE, DELAYED RELEASE ORAL DAILY
Qty: 30 CAPSULE | Refills: 6 | Status: SHIPPED | OUTPATIENT
Start: 2017-03-09 | End: 2017-03-23

## 2017-03-09 RX ORDER — POTASSIUM CHLORIDE 1.5 G/1.58G
20 POWDER, FOR SOLUTION ORAL DAILY
Qty: 30 PACKET | Refills: 6 | Status: SHIPPED | OUTPATIENT
Start: 2017-03-09

## 2017-03-09 NOTE — PROGRESS NOTES
[facility]  New Visit Progress Note   Recent Hospital Discharge     PRESENTING HISTORY     Chief Complaint/Reason for Admission:  Follow up Hospital Discharge       PCP: Pino Estrada MD    History of Present Illness:  Ms. Angel Rincon is a 87 y.o. female who was recently admitted to the hospital.  Discharge Summaries  Aldo Dumont MD   [facility]   Cosigned by: Paulina Parker MD at 3/5/2017 12:31 PM      []Hide copied text  [facility] Medicine Discharge Summary     Primary Team: [facility] Medicine Team A  Attending Physician: Dr. Parker  Resident: Dr. Dumont  Intern: Dr. Brooks     Date of Admit: 3/1/2017  Date of Discharge: 3/2/2017     Discharge to: Home  Condition: Stable     Discharge Diagnoses      - Acute Shortness of Breath 2/2 Volume Overload in the setting of Acute on Chronic Diastolic Heart Failure  - Afib  - HTN  - Polycythemia Vera  - Normocytic Anemia  - H/O CAD s/p Stents x 3, h/o CABG  - HLD  - Leg Pain with a history of Restless Leg Syndrome  - Chewing Tobacco Use  - Leukocytosis  - Thrombocytosis        Consultants and Procedures      Consultants:  None     Procedures:   None     Brief History of Present Illness   Angel Rincon is a 87 y.o. female who has a past medical history of HTN, HLD, CAD s/p 3 stents (2013) and CABG in 2013, Afib, Polycythemia Vera, and Restless Leg Syndrome.       The patient was in their usual state of health until 2.5 weeks prior to admit when she began experiencing worsening SOB and cough on exertion. She stated that prior to this time, she was able to walk about 2 blocks without any issues. For the past 2 weeks she stated that she was unable to walk about 1/2 block before she became severely short of breath and had a continuous wet cough. She stated that she was compliant with her lasix 20 mg PO daily but it had not helped to alleviate her symptoms.      For the full HPI please refer to the History & Physical from this  admission.     Hospital Course By Problem with Pertinent Findings      Acute Shortness of Breath 2/2 Volume Overload in the setting of acute on chronic diastolic Heart Failure  - pt presented with SOB, cough, and increased leg swelling x 2.5 weeks  - CXR with cardiomegaly and mild pulmonary vascular congestion  -   - Echo on 3/2/2017 shows:   1 - Biatrial enlargement.      2 - Concentric hypertrophy.      3 - Normal left ventricular systolic function (EF 60-65%).      4 - Right ventricular enlargement with normal systolic function.      5 - The estimated PA systolic pressure is 39 mmHg.      6 - Mild aortic regurgitation.      7 - Mild mitral regurgitation.      8 - Moderate to severe tricuspid regurgitation.      9 - Intermediate central venous pressure      - received Lasix 40 mg IV x 1 in ED with good response  - started on lasix 40 mg IV daily while inpatient and diuresed well.   - she was continued on home ARB and statin  - she was kept on a low Na diet during her hospital stay  - at discharge, patient's lasix was increased to 40 mg PO daily and Losartan was increased to 50 mg PO daily  - patient was also started on Coreg 6.25 mg PO BID at discharge  - she will f/u with her cardiologist at [facility] within 2 weeks of discharge      Afib   - CHADS2-VASC Score of 5  - continued home Diltiazem and Xarelto during her hospitalization and at discharge      HTN   - BP stable  - continued Losartan during her hospital stay and increased Losartan to 50 mg PO daily at discharge      Polycythemia Vera  - H/H stable  - continued Hydroxyurea during her hospital stay and at discharge  - patient will f/u with her hematologist within 2 weeks of discharge      Normocytic Anemia  - H/H on admit 11/38.7  - iron studies with ferritin 93, iron 28, TIBC 460       H/O CAD s/p Stents x 3, H/O CABG  - denied chest pain  - continued home statin, ARB      HLD  - Lipid panel with LDL 28, HDL 40, total cholesterol 82  - continued home  statin      Leg Pain with a history of restless leg syndrome  - follows with Pain Management, Dr. Haley  - continued Norco PRN per Dr. Haley's recommendations      Chewing Tobacco Use  - patient stated that she has been dipping with snuff since she was a teenager  - counseled on the risks of this behavior such as oral cancer and thus the importance of cessation      Leukocytosis and Thrombocytosis  - WBC on presentation was 17.73 and platelets 451  - pt afebrile and vitals stable  - never received abx therapy as patient remained clinically stable and without s/s of infection during her hospitalization     Discharge Medications          Medication List       START taking these medications            carvedilol 6.25 MG tablet   Commonly known as: COREG   Take 1 tablet (6.25 mg total) by mouth 2 (two) times daily with meals.          CHANGE how you take these medications            furosemide 40 MG tablet   Commonly known as: LASIX   Take 1 tablet (40 mg total) by mouth once daily.   What changed:   - medication strength  - how much to take         losartan 25 MG tablet   Commonly known as: COZAAR   Take 2 tablets (50 mg total) by mouth once daily.   What changed: how much to take          CONTINUE taking these medications            diltiaZEM 240 MG 24 hr capsule   Commonly known as: CARDIZEM CD         gabapentin 100 MG capsule   Commonly known as: NEURONTIN   1-2 tablets qHS. Stop if causing excess sedation         hydroxyurea 500 mg Cap   Commonly known as: HYDREA   TAKE 1 CAPSULE TWICE DAILY         melatonin 10 mg Cap   Take 10 mg by mouth every evening.         nitroGLYCERIN 0.4 MG/DOSE TL SPRY 400 mcg/spray spray   Commonly known as: NITROLINGUAL         potassium chloride SA 20 MEQ tablet   Commonly known as: K-DUR,KLOR-CON   Take 1 tablet (20 mEq total) by mouth once daily.         rosuvastatin 10 MG tablet   Commonly known as: CRESTOR         tramadol 50 mg tablet   Commonly known as: ULTRAM   Take 1  tablet (50 mg total) by mouth every 8 (eight) hours as needed (severe pain only). 2nd tablet allowed if no relief at 1 hr         XARELTO 20 mg Tab   Generic drug: rivaroxaban                        Where to Get Your Medications            You can get these medications from any pharmacy           Bring a paper prescription for each of these medications       carvedilol 6.25 MG tablet    furosemide 40 MG tablet    losartan 25 MG tablet                  Discharge Information:   Diet:  Low Na, Cardiac     Physical Activity:  As tolerated     Instructions:  1. Take all medications as prescribed  2. Keep all follow-up appointments  3. Return to the hospital or call your primary care physicians if any worsening symptoms such as Temp >100.4F, chest pain, severe shortness of breath, confusion, lethargy, or any other concerns occur.     Follow-Up Appointments:      Follow-up Information      Follow up with Pino Estrada MD. Schedule an appointment as soon as possible for a visit in 2 weeks.     Specialty: Internal Medicine     Contact information:     [address] [facility] [address] [phone]           Follow up with Chuck Arceo MD. Schedule an appointment as soon as possible for a visit in 2 weeks.     Specialty: Cardiology     Contact information:     [address] [facility] [address]  [phone]          Aldo Dumont  [facility] Internal Medicine, HO-4          ___________________________________________________________________    Today:  Mrs. Rincon is an 87-year-old female who presents the priority clinic for hospital follow-up.    The patient was recently hospitalized for volume overload due to acute on chronic diastolic heart failure.  She also has tricuspid regurgitation and chronic atrial fibrillation.  She is rate controlled on diltiazem and anticoagulated with Xarelto.  Her cardiologist is Dr. Leos and she sees him at [facility].  After her recent  discharge from [place], she went to [facility] emergency department with complaints of weakness and near syncope.  She reports that she was diagnosed with a urinary tract infection, and she was placed on antibiotics.  She is still on antibiotics.  She also reports a CT scan of her abdomen was performed, and that she was diagnosed with a kidney stone in her left kidney.  She was told that she had blood in her urine.  She does complain of pain in her left mid back area.  This pain has been present for less than a week.    The patient's main complaint today is increasing abdominal girth.  This is been ongoing for several months.  She is experiencing early satiety.  She denies nausea, vomiting, diarrhea, or constipation.  She denies black or bloody stools.  She had a total abdominal hysterectomy at age 50 secondary to uterine fibroids.  She denies any vaginal bleeding or vaginal discharge.    The patient has polycythemia vera.  She is followed by Dr. Shayan Hernandez.  She takes hydroxyurea and she tells me that she is supposed to get blood drawn monthly, which is monitored by her hematologist.  However, it seems as if she has not had these labs drawn for several months, and has possibly been lost to follow-up.  I counseled the patient and her daughter that it is important to get these labs drawn, and to maintain her relationship with Dr. Hernandez.  A CBC was drawn today and reveals evidence of an increased white blood cell count and an increase in her platelet count.    Today the patient reports some mild dyspnea, as well as bilateral lower extremity swelling.  Her left extremity is slightly more edematous than the right, but she tells me that this is her baseline.    The patient has several upcoming appointments, including pain management with Dr. Haley and rheumatology with Dr. Dueñas.  She suffers from advanced arthritis and has extensive joint pain.    Medication was reviewed today and it was  discovered that the patient was taking two of her 50 mg losartan tablets.  This was corrected, and the patient will only be taking 1.    Review of Systems  General ROS: negative for chills, fever or weight loss  Psychological ROS: negative for hallucination, depression or suicidal ideation  Ophthalmic ROS: negative for blurry vision, photophobia or eye pain  ENT ROS: negative for epistaxis, sore throat or rhinorrhea  Respiratory ROS: Positive for wet cough and shortness of breath , no wheezing  Cardiovascular ROS: no chest pain or dyspnea on exertion  Gastrointestinal ROS: no abdominal pain, change in bowel habits, or black/ bloody stools; positive for increasing abdominal girth  Genito-Urinary ROS: no dysuria, trouble voiding, or gross hematuria; + hematuria reported to patient at [facility]  Musculoskeletal ROS: negative for gait disturbance or muscular weakness  Neurological ROS: no syncope or seizures; no ataxia  Dermatological ROS: negative for pruritis, rash and jaundice      PAST HISTORY:     Past Medical History:   Diagnosis Date    Angina of effort     Arthritis     Back pain     Cancer     Hyperlipidemia     Hypertension     Osteoporosis     Polycythemia     Restless leg        Past Surgical History:   Procedure Laterality Date    ANGIOPLASTY      CORONARY ARTERY BYPASS GRAFT      HYSTERECTOMY      TONSILLECTOMY         Family History   Problem Relation Age of Onset    Adopted: Yes    Cancer Daughter      soft-tissue sarcoma    Melanoma Neg Hx        Social History     Social History    Marital status:      Spouse name: N/A    Number of children: N/A    Years of education: N/A     Social History Main Topics    Smoking status: Never Smoker    Smokeless tobacco: Current User     Types: Snuff    Alcohol use 1.2 oz/week     2 Cans of beer per week    Drug use: No    Sexual activity: Not Asked     Other Topics Concern    None     Social History Narrative    Cardiologist at  [employer]  Sees him every 6 months. Lives independently in a mobile home.  Daughter provides the majority of medical management for the patient.  Patient has 2 sons that live nearby.  None live with her.  Able to manage all of her activities of daily living according to her daughter        Outside neurologist previously Dr. Landers.        MEDICATIONS & ALLERGIES:     Current Outpatient Prescriptions on File Prior to Visit   Medication Sig Dispense Refill    carvedilol (COREG) 6.25 MG tablet Take 1 tablet (6.25 mg total) by mouth 2 (two) times daily with meals. 60 tablet 11    diltiazem (CARDIZEM CD) 240 MG 24 hr capsule       furosemide (LASIX) 40 MG tablet Take 1 tablet (40 mg total) by mouth once daily. 90 tablet 6    hydroxyurea (HYDREA) 500 mg Cap TAKE 1 CAPSULE TWICE DAILY 60 capsule 5    nitroGLYCERIN 0.4 MG/DOSE TL SPRY (NITROLINGUAL) 0.4 mg/dose spray       rivaroxaban (XARELTO) 20 mg Tab Take by mouth once daily.       rosuvastatin (CRESTOR) 10 MG tablet Take 10 mg by mouth once daily.        [DISCONTINUED] potassium chloride SA (K-DUR,KLOR-CON) 20 MEQ tablet Take 1 tablet (20 mEq total) by mouth once daily. 90 tablet 1    gabapentin (NEURONTIN) 100 MG capsule 1-2 tablets qHS. Stop if causing excess sedation 60 capsule 1    melatonin 10 mg Cap Take 10 mg by mouth every evening.      [DISCONTINUED] losartan (COZAAR) 25 MG tablet Take 2 tablets (50 mg total) by mouth once daily. 90 tablet 7     No current facility-administered medications on file prior to visit.         Review of patient's allergies indicates:   Allergen Reactions    Citalopram      Other reaction(s): agitation    Codeine Nausea Only    Sulfa (sulfonamide antibiotics)      Other reaction(s): Swelling    Xanax [alprazolam] Other (See Comments)     Unspecified reaction       OBJECTIVE:     Vital Signs:  Vitals:    03/09/17 1507   BP: 123/65   Pulse: 80   Temp: 98.4 °F (36.9 °C)     Wt Readings from Last 1 Encounters:    03/09/17 1507 53.5 kg (117 lb 15.1 oz)     Body mass index is 27.41 kg/(m^2).       Physical Exam:  /65 (BP Location: Right arm, Patient Position: Sitting, BP Method: Automatic)  Pulse 80  Temp 98.4 °F (36.9 °C) (Oral)   Wt 53.5 kg (117 lb 15.1 oz)  SpO2 99%  BMI 27.41 kg/m2  General appearance: alert, cooperative, no distress  Constitutional:Oriented to person, place, and time.appears well-developed and well-nourished.   HEENT: Normocephalic, atraumatic, neck symmetrical, no nasal discharge   Eyes: conjunctivae/corneas clear, PERRL, EOM's intact  Lungs: clear to auscultation bilaterally, no dullness to percussion bilaterally  Heart: regular rate and rhythm without rub; no displacement of the PMI   Abdomen: soft, non-tender; positive for abdominal distention positive for increased abdominal girth; bowel sounds normoactive; no organomegaly  Extremities: extremities symmetric; no clubbing, cyanosis, + bilateral lower extremity edema +1, left greater than right  Integument: Skin color, texture, turgor normal; no rashes; hair distrubution normal  Neurologic: Alert and oriented X 3, normal strength, normal coordination and gait  Psychiatric: no pressured speech; normal affect; no evidence of impaired cognition     Laboratory  Lab Results   Component Value Date    WBC 17.25 (H) 03/09/2017    HGB 11.3 (L) 03/09/2017    HCT 39.7 03/09/2017    MCV 83 03/09/2017     (H) 03/09/2017     BMP  Lab Results   Component Value Date     03/09/2017    K 4.8 03/09/2017     03/09/2017    CO2 25 03/09/2017    BUN 20 03/09/2017    CREATININE 1.1 03/09/2017    CALCIUM 9.2 03/09/2017    ANIONGAP 10 03/09/2017    ESTGFRAFRICA 52 (A) 03/09/2017    EGFRNONAA 45 (A) 03/09/2017     Lab Results   Component Value Date    ALT 8 (L) 03/02/2017    AST 16 03/02/2017    ALKPHOS 118 03/02/2017    BILITOT 1.2 (H) 03/02/2017     Lab Results   Component Value Date    INR 1.1 08/18/2016    INR 1.0 08/05/2010    INR 0.9  11/06/2007     Lab Results   Component Value Date    HGBA1C 5.6 03/02/2017     No results for input(s): POCTGLUCOSE in the last 72 hours.      ASSESSMENT & PLAN:       Acute on chronic diastolic heart failure  The patient had a recent admission to percussion or counter for acute on chronic diastolic heart failure, and then had a visit to the [facility] emergency department for weakness and volume overload.  She is currently on heart failure appropriate medications.  She has some mild bilateral lower extremity edema but does not appear to have significant volume overload at present.  Her cardiologist is Dr. Leos [facility], and she has an appointment with him on March 23.    Nephrolithiasis  The patient underwent a CT scan of her abdomen during her ER visit to [facility].  She was told that she had a kidney stone on the left.  She was also told that she had hematuria, although she has not noticed any gross blood in her urine.  She does report left flank pain which is acute in nature.  She will undergo abdominal and renal ultrasound which has been scheduled.    Increased abdominal girth  -     US Abdomen Complete; Future; Expected date: 3/9/17  The patient has noticed increasing abdominal girth and early satiety.  This is her main complaint today.  She has a family member who had ovarian cancer and she is concerned about this.  The patient had a hysterectomy at age 50, she tells me her ovaries were also removed.  The hysterectomy was done because of uterine fibroids.    Costovertebral angle tenderness    Acute cystitis with hematuria/UTI  The patient was diagnosed with a urinary tract infection during her ED visit to [facility].  She was placed on Ceftin which she is still taking.  It is unknown if her urine was sent for culture, and I will try to obtain records from [facility].    Anxiety  -     duloxetine (CYMBALTA) 30 MG capsule; Take 1 capsule  (30 mg total) by mouth once daily.  Dispense: 30 capsule; Refill: 6    Non-rheumatic tricuspid valve insufficiency    Chronic atrial fibrillation  She is currently rate controlled    Chronic anticoagulation  The patient is anticoagulated with Xarelto.  She denies any bleeding complications.      Essential hypertension   Review of medications reveals that this patient was inadvertently taking 100 mg of losartan daily instead of the 50 mg which is prescribed.  This mistake was corrected and the patient and her daughter understand that she is to take 50 mg daily.  -     losartan (COZAAR) 50 MG tablet; Take 1 tablet (50 mg total) by mouth once daily.    Other orders  -     potassium chloride (KLOR-CON) 20 mEq Pack; Take 20 mEq by mouth once daily.  Dispense: 30 packet; Refill: 6  The patient reports difficulty taking her potassium pills.  She has been dissolving them in orange juice.  She is requesting a different form of potassium.      Polycethemia vera   leukocytosis and thrombocytosis are noted on labs today.  It seems that this patient has been lost to follow-up with her hematologist.  I have instructed her to resume the monthly labs at her team hematologist has ordered, and to make an appointment with him as soon as possible.  She is followed by Dr. Shayan Hernandez.      Instructions for the patient:  The patient will undergo abdominal and renal ultrasound.  We'll see her again on March 23 following this ultrasound.  She is instructed to call the primary clinic for any concern or if she feels she needs to be seen earlier than March 23.    Scheduled Follow-up :  Future Appointments  Date Time Provider Department Center   3/16/2017 9:00 AM [facility] US2 [facility]   3/23/2017 10:30 AM Romi Rogers MD [facility]   4/6/2017 2:20 PM Caitlyn Loyola MD [facility] DERM [place]       Post Visit Medication List:     Medication List          This list is accurate as of: 3/9/17  4:40 PM.  Always use  your most recent med list.                     carvedilol 6.25 MG tablet   Commonly known as:  COREG   Take 1 tablet (6.25 mg total) by mouth 2 (two) times daily with meals.       cefUROXime 500 MG tablet   Commonly known as:  CEFTIN       diltiaZEM 240 MG 24 hr capsule   Commonly known as:  CARDIZEM CD       duloxetine 30 MG capsule   Commonly known as:  CYMBALTA   Take 1 capsule (30 mg total) by mouth once daily.       furosemide 40 MG tablet   Commonly known as:  LASIX   Take 1 tablet (40 mg total) by mouth once daily.       gabapentin 100 MG capsule   Commonly known as:  NEURONTIN   1-2 tablets qHS. Stop if causing excess sedation       hydroxyurea 500 mg Cap   Commonly known as:  HYDREA   TAKE 1 CAPSULE TWICE DAILY       losartan 50 MG tablet   Commonly known as:  COZAAR   Take 1 tablet (50 mg total) by mouth once daily.       melatonin 10 mg Cap   Take 10 mg by mouth every evening.       nitroGLYCERIN 0.4 MG/DOSE TL SPRY 400 mcg/spray spray   Commonly known as:  NITROLINGUAL       potassium chloride 20 mEq Pack   Commonly known as:  KLOR-CON   Take 20 mEq by mouth once daily.       rosuvastatin 10 MG tablet   Commonly known as:  CRESTOR       XARELTO 20 mg Tab   Generic drug:  rivaroxaban            Where to Get Your Medications      These medications were sent to [facility]/pharmacy #[id number] - [place] - [address] [facility]  [address] [place] [address]     Phone:  [phone]     duloxetine 30 MG capsule    potassium chloride 20 mEq Pack         Information about where to get these medications is not yet available     ! Ask your nurse or doctor about these medications     losartan 50 MG tablet             Signing Physician:  Romi Rogers MD

## 2017-03-09 NOTE — TELEPHONE ENCOUNTER
"Rec'd call from pt daughter Nori explaining that she will not be able to attend [facility] appt , stating " I took my mother to [facility] yesterday". Discussed with dtr the ER visit, she stated, " Mom had leg swelling and shortness of breath, it's not her heart, and they gave her Lasix and potassium pills, they also found a kidney stone". I attempted to reschedule the appt for today, she stated, " I can't come today, I'm already out in my yard, can I come on the twenty third?" I explained the importance of the HOSPFU visit, she stated understanding. Appt re-scheduled for today 3 with labs at 2:30. Dr. Rogers notified.  "

## 2017-03-09 NOTE — MR AVS SNAPSHOT
[facility]  [address]  [place] [address]  Phone: [phone]  Fax: [phone]                  Angel Rincon   3/9/2017 3:00 PM   Office Visit    Description:  Female : 1929   Provider:  Romi Rogers MD   Department:  [facility]           Diagnoses this Visit        Comments    Increased abdominal girth    -  Primary     Nephrolithiasis         Costovertebral angle tenderness         Acute cystitis without hematuria         Anxiety         Non-rheumatic tricuspid valve insufficiency         Acute on chronic diastolic heart failure         Tricuspid valve insufficiency, unspecified etiology         Chronic atrial fibrillation                To Do List           Future Appointments        Provider Department Dept Phone    3/16/2017 9:00 AM [facility] [phone]    3/23/2017 10:30 AM Romi Rogers MD [facility] [phone]    2017 2:20 PM Caitlyn Loyola MD [place] - Dermatology [phone]      Goals (5 Years of Data)     None       These Medications        Disp Refills Start End    potassium chloride (KLOR-CON) 20 mEq Pack 30 packet 6 3/9/2017     Take 20 mEq by mouth once daily. - Oral    Pharmacy: [facility]/pharmacy #[id number] - [place] - [address] Ph #: [phone]       losartan (COZAAR) 50 MG tablet   3/9/2017     Take 1 tablet (50 mg total) by mouth once daily. - Oral    Pharmacy: [facility]/pharmacy #[id number] - [place] [address] Ph #: [phone]       duloxetine (CYMBALTA) 30 MG capsule 30 capsule 6 3/9/2017 3/9/2018    Take 1 capsule (30 mg total) by mouth once daily. - Oral    Pharmacy: [facility]/pharmacy #[id number] - [place] - [address] Ph #: [phone]         [facility]     [facility] -  Assistance  Registered nurses in the [facility] provide clinical advisement, health education,  appointment booking, and other advisory services.  Call for this free service at [phone].             Medications           Message regarding Medications     Verify the changes and/or additions to your medication regime listed below are the same as discussed with your clinician today.  If any of these changes or additions are incorrect, please notify your healthcare provider.        START taking these NEW medications        Refills    potassium chloride (KLOR-CON) 20 mEq Pack 6    Sig: Take 20 mEq by mouth once daily.    Class: Normal    Route: Oral    duloxetine (CYMBALTA) 30 MG capsule 6    Sig: Take 1 capsule (30 mg total) by mouth once daily.    Class: Normal    Route: Oral      CHANGE how you are taking these medications     Start Taking Instead of    losartan (COZAAR) 50 MG tablet losartan (COZAAR) 25 MG tablet    Dosage:  Take 1 tablet (50 mg total) by mouth once daily. Dosage:  Take 2 tablets (50 mg total) by mouth once daily.    Reason for Change:  Reorder       STOP taking these medications     potassium chloride SA (K-DUR,KLOR-CON) 20 MEQ tablet Take 1 tablet (20 mEq total) by mouth once daily.           Verify that the below list of medications is an accurate representation of the medications you are currently taking.  If none reported, the list may be blank. If incorrect, please contact your healthcare provider. Carry this list with you in case of emergency.           Current Medications     carvedilol (COREG) 6.25 MG tablet Take 1 tablet (6.25 mg total) by mouth 2 (two) times daily with meals.    cefUROXime (CEFTIN) 500 MG tablet Take 500 mg by mouth 2 (two) times daily.    diltiazem (CARDIZEM CD) 240 MG 24 hr capsule     duloxetine (CYMBALTA) 30 MG capsule Take 1 capsule (30 mg total) by mouth once daily.    furosemide (LASIX) 40 MG tablet Take 1 tablet (40 mg total) by mouth once daily.    gabapentin (NEURONTIN) 100 MG capsule 1-2 tablets qHS. Stop if causing excess sedation    hydroxyurea  (HYDREA) 500 mg Cap TAKE 1 CAPSULE TWICE DAILY    losartan (COZAAR) 50 MG tablet Take 1 tablet (50 mg total) by mouth once daily.    melatonin 10 mg Cap Take 10 mg by mouth every evening.    nitroGLYCERIN 0.4 MG/DOSE TL SPRY (NITROLINGUAL) 0.4 mg/dose spray     potassium chloride (KLOR-CON) 20 mEq Pack Take 20 mEq by mouth once daily.    rivaroxaban (XARELTO) 20 mg Tab Take by mouth once daily.     rosuvastatin (CRESTOR) 10 MG tablet Take 10 mg by mouth once daily.             Clinical Reference Information           Your Vitals Were     BP Pulse Temp Weight SpO2 BMI    123/65 (BP Location: Right arm, Patient Position: Sitting, BP Method: Automatic) 80 98.4 °F (36.9 °C) (Oral) 53.5 kg (117 lb 15.1 oz) 99% 27.41 kg/m2      Blood Pressure          Most Recent Value    BP  123/65      Allergies as of 3/9/2017     Citalopram    Codeine    Sulfa (Sulfonamide Antibiotics)    Xanax [Alprazolam]      Immunizations Administered on Date of Encounter - 3/9/2017     None      Orders Placed During Today's Visit     Future Labs/Procedures Expected by Expires    US Abdomen Complete  3/9/2017 3/9/2018      Smoking Cessation     If you would like to quit smoking:   You may be eligible for free services if you are a [place] resident and started smoking cigarettes before September 1, 1988.  Call the Smoking Cessation Trust ([facility]) toll free at (209) 097-2843 or (657) 737-2627.   Call 1-800-QUIT-NOW if you do not meet the above criteria.            Language Assistance Services     ATTENTION: Language assistance services are available, free of charge. Please call [phone].      ATENCIÓN: Si habla español, tiene a devries disposición servicios gratuitos de asistencia lingüística. Llame al [phone].     [facility] Ý: N?u b?n nói Ti?ng Vi?t, có các d?ch v? h? tr? ngôn ng? mi?n phí dành cho b?n. G?i s? [phone].         [place] [facility] complies with applicable Federal civil rights laws and does not discriminate  on the basis of race, color, national origin, age, disability, or sex.

## 2017-03-10 ENCOUNTER — TELEPHONE (OUTPATIENT)
Dept: INTERNAL MEDICINE | Facility: CLINIC | Age: 82
End: 2017-03-10

## 2017-03-10 NOTE — TELEPHONE ENCOUNTER
Patient has been taking potassium tablets since discharge despite difficulty swallowing large tablets.  They are proceeding with alternative formulation.    Recommend patient continue to follow with priority clinic.  Return to clinic once priority care management has concluded.

## 2017-03-13 ENCOUNTER — DOCUMENTATION ONLY (OUTPATIENT)
Dept: PRIMARY CARE CLINIC | Facility: CLINIC | Age: 82
End: 2017-03-13

## 2017-03-16 ENCOUNTER — HOSPITAL ENCOUNTER (OUTPATIENT)
Dept: RADIOLOGY | Facility: HOSPITAL | Age: 82
Discharge: HOME | End: 2017-03-16
Attending: INTERNAL MEDICINE
Payer: MEDICARE

## 2017-03-16 DIAGNOSIS — R19.8 INCREASED ABDOMINAL GIRTH: ICD-10-CM

## 2017-03-16 PROCEDURE — 76700 US EXAM ABDOM COMPLETE: CPT | Mod: TC

## 2017-03-16 PROCEDURE — 76700 US EXAM ABDOM COMPLETE: CPT | Mod: 26,,, | Performed by: RADIOLOGY

## 2017-03-23 ENCOUNTER — OFFICE VISIT (OUTPATIENT)
Dept: PRIMARY CARE CLINIC | Facility: CLINIC | Age: 82
End: 2017-03-23
Payer: MEDICARE

## 2017-03-23 ENCOUNTER — LAB VISIT (OUTPATIENT)
Dept: LAB | Facility: HOSPITAL | Age: 82
End: 2017-03-23
Attending: INTERNAL MEDICINE
Payer: MEDICARE

## 2017-03-23 VITALS
RESPIRATION RATE: 16 BRPM | WEIGHT: 117 LBS | BODY MASS INDEX: 27.08 KG/M2 | DIASTOLIC BLOOD PRESSURE: 69 MMHG | HEART RATE: 91 BPM | TEMPERATURE: 98 F | HEIGHT: 55 IN | OXYGEN SATURATION: 97 % | SYSTOLIC BLOOD PRESSURE: 133 MMHG

## 2017-03-23 DIAGNOSIS — E78.5 HYPERLIPIDEMIA, UNSPECIFIED HYPERLIPIDEMIA TYPE: ICD-10-CM

## 2017-03-23 DIAGNOSIS — I48.20 CHRONIC ATRIAL FIBRILLATION: ICD-10-CM

## 2017-03-23 DIAGNOSIS — Z79.01 CHRONIC ANTICOAGULATION: ICD-10-CM

## 2017-03-23 DIAGNOSIS — D45 POLYCYTHEMIA VERA: ICD-10-CM

## 2017-03-23 DIAGNOSIS — N20.0 LEFT NEPHROLITHIASIS: ICD-10-CM

## 2017-03-23 DIAGNOSIS — I10 ESSENTIAL HYPERTENSION: ICD-10-CM

## 2017-03-23 DIAGNOSIS — I50.32 CHRONIC DIASTOLIC HEART FAILURE: Primary | ICD-10-CM

## 2017-03-23 DIAGNOSIS — I50.32 CHRONIC DIASTOLIC HEART FAILURE: ICD-10-CM

## 2017-03-23 LAB
ANION GAP SERPL CALC-SCNC: 9 MMOL/L
BASOPHILS # BLD AUTO: 0.03 K/UL
BASOPHILS NFR BLD: 0.2 %
BUN SERPL-MCNC: 13 MG/DL
CALCIUM SERPL-MCNC: 9.1 MG/DL
CHLORIDE SERPL-SCNC: 103 MMOL/L
CO2 SERPL-SCNC: 26 MMOL/L
CREAT SERPL-MCNC: 0.9 MG/DL
DIFFERENTIAL METHOD: ABNORMAL
EOSINOPHIL # BLD AUTO: 0.3 K/UL
EOSINOPHIL NFR BLD: 1.7 %
ERYTHROCYTE [DISTWIDTH] IN BLOOD BY AUTOMATED COUNT: 18.9 %
EST. GFR  (AFRICAN AMERICAN): >60 ML/MIN/1.73 M^2
EST. GFR  (NON AFRICAN AMERICAN): 58 ML/MIN/1.73 M^2
GLUCOSE SERPL-MCNC: 116 MG/DL
HCT VFR BLD AUTO: 37.5 %
HGB BLD-MCNC: 11 G/DL
LYMPHOCYTES # BLD AUTO: 0.7 K/UL
LYMPHOCYTES NFR BLD: 4.3 %
MCH RBC QN AUTO: 24 PG
MCHC RBC AUTO-ENTMCNC: 29.3 %
MCV RBC AUTO: 82 FL
MONOCYTES # BLD AUTO: 0.3 K/UL
MONOCYTES NFR BLD: 2 %
NEUTROPHILS # BLD AUTO: 13.9 K/UL
NEUTROPHILS NFR BLD: 91.3 %
PLATELET # BLD AUTO: 324 K/UL
PMV BLD AUTO: 10.5 FL
POTASSIUM SERPL-SCNC: 3.6 MMOL/L
RBC # BLD AUTO: 4.58 M/UL
SODIUM SERPL-SCNC: 138 MMOL/L
WBC # BLD AUTO: 15.2 K/UL

## 2017-03-23 PROCEDURE — 1160F RVW MEDS BY RX/DR IN RCRD: CPT | Mod: S$GLB,,, | Performed by: INTERNAL MEDICINE

## 2017-03-23 PROCEDURE — 80048 BASIC METABOLIC PNL TOTAL CA: CPT

## 2017-03-23 PROCEDURE — 99999 PR PBB SHADOW E&M-EST. PATIENT-LVL III: CPT | Mod: PBBFAC,,, | Performed by: INTERNAL MEDICINE

## 2017-03-23 PROCEDURE — 1159F MED LIST DOCD IN RCRD: CPT | Mod: S$GLB,,, | Performed by: INTERNAL MEDICINE

## 2017-03-23 PROCEDURE — 85025 COMPLETE CBC W/AUTO DIFF WBC: CPT

## 2017-03-23 PROCEDURE — 99499 UNLISTED E&M SERVICE: CPT | Mod: S$GLB,,, | Performed by: INTERNAL MEDICINE

## 2017-03-23 PROCEDURE — 1157F ADVNC CARE PLAN IN RCRD: CPT | Mod: S$GLB,,, | Performed by: INTERNAL MEDICINE

## 2017-03-23 PROCEDURE — 1126F AMNT PAIN NOTED NONE PRSNT: CPT | Mod: S$GLB,,, | Performed by: INTERNAL MEDICINE

## 2017-03-23 PROCEDURE — 99214 OFFICE O/P EST MOD 30 MIN: CPT | Mod: S$GLB,,, | Performed by: INTERNAL MEDICINE

## 2017-03-23 PROCEDURE — 36415 COLL VENOUS BLD VENIPUNCTURE: CPT

## 2017-03-23 RX ORDER — ROSUVASTATIN CALCIUM 10 MG/1
10 TABLET, COATED ORAL DAILY
Qty: 90 TABLET | Refills: 3 | Status: SHIPPED | OUTPATIENT
Start: 2017-03-23

## 2017-03-23 NOTE — MR AVS SNAPSHOT
[facility]  [address]  [place] [address]  Phone: [phone]  Fax: [phone]                  Angel Rincon   3/23/2017 10:30 AM   Office Visit    Description:  Female : 1929   Provider:  Romi Rogers MD   Department:  [facility]           Reason for Visit     Follow-up           Diagnoses this Visit        Comments    Chronic diastolic heart failure    -  Primary     Chronic atrial fibrillation         Chronic anticoagulation         Essential hypertension         Polycythemia vera         Left nephrolithiasis         Hyperlipidemia, unspecified hyperlipidemia type                To Do List           Future Appointments        Provider Department Dept Phone    2017 2:20 PM Caitlyn Loyola MD [place] - Dermatology [phone]    2017 2:20 PM Pino Estrada MD [facility] Internal Medicine [phone]      Goals (5 Years of Data)     None       These Medications        Disp Refills Start End    rosuvastatin (CRESTOR) 10 MG tablet 90 tablet 3 3/23/2017     Take 1 tablet (10 mg total) by mouth once daily. - Oral    Pharmacy: [facility] #[id number] - [place], [place] [facility] [address] Ph #: [phone]         [facility]     [facility] Care [facility] -  Assistance  Registered nurses in the [facility] provide clinical advisement, health education, appointment booking, and other advisory services.  Call for this free service at [phone].             Medications           Message regarding Medications     Verify the changes and/or additions to your medication regime listed below are the same as discussed with your clinician today.  If any of these changes or additions are incorrect, please notify your healthcare provider.        CHANGE how you are taking these medications     Start Taking Instead of    rosuvastatin (CRESTOR) 10 MG tablet rosuvastatin (CRESTOR) 10 MG tablet  "   Dosage:  Take 1 tablet (10 mg total) by mouth once daily. Dosage:  Take 10 mg by mouth once daily.      Reason for Change:  Reorder       STOP taking these medications     duloxetine (CYMBALTA) 30 MG capsule Take 1 capsule (30 mg total) by mouth once daily.    gabapentin (NEURONTIN) 100 MG capsule 1-2 tablets qHS. Stop if causing excess sedation    melatonin 10 mg Cap Take 10 mg by mouth every evening.    cefUROXime (CEFTIN) 500 MG tablet Take 500 mg by mouth 2 (two) times daily.           Verify that the below list of medications is an accurate representation of the medications you are currently taking.  If none reported, the list may be blank. If incorrect, please contact your healthcare provider. Carry this list with you in case of emergency.           Current Medications     carvedilol (COREG) 6.25 MG tablet Take 1 tablet (6.25 mg total) by mouth 2 (two) times daily with meals.    diltiazem (CARDIZEM CD) 240 MG 24 hr capsule     furosemide (LASIX) 40 MG tablet Take 1 tablet (40 mg total) by mouth once daily.    hydroxyurea (HYDREA) 500 mg Cap TAKE 1 CAPSULE TWICE DAILY    losartan (COZAAR) 50 MG tablet Take 1 tablet (50 mg total) by mouth once daily.    nitroGLYCERIN 0.4 MG/DOSE TL SPRY (NITROLINGUAL) 0.4 mg/dose spray     potassium chloride (KLOR-CON) 20 mEq Pack Take 20 mEq by mouth once daily.    rivaroxaban (XARELTO) 20 mg Tab Take by mouth once daily.     rosuvastatin (CRESTOR) 10 MG tablet Take 1 tablet (10 mg total) by mouth once daily.           Clinical Reference Information           Your Vitals Were     BP Pulse Temp Resp Height Weight    133/69 91 98.1 °F (36.7 °C) 16 4' 7" (1.397 m) 53.1 kg (117 lb)    SpO2 BMI             97% 27.19 kg/m2         Blood Pressure          Most Recent Value    BP  133/69      Allergies as of 3/23/2017     Citalopram    Codeine    Sulfa (Sulfonamide Antibiotics)    Xanax [Alprazolam]      Immunizations Administered on Date of Encounter - 3/23/2017     None    "   Orders Placed During Today's Visit     Future Labs/Procedures Expected by Expires    Basic metabolic panel  3/21/2017 5/20/2018    CBC auto differential  3/21/2017 5/20/2018      Smoking Cessation     If you would like to quit smoking:   You may be eligible for free services if you are a [place] resident and started smoking cigarettes before September 1, 1988.  Call the Smoking Cessation Trust ([employer]) toll free at (166) 418-9723 or (222) 948-4044.   Call 1-800-QUIT-NOW if you do not meet the above criteria.            Language Assistance Services     ATTENTION: Language assistance services are available, free of charge. Please call [phone].      ATENCIÓN: Si habla español, tiene a devries disposición servicios gratuitos de asistencia lingüística. Llame al [phone].     CHÚ Ý: N?u b?n nói Ti?ng Vi?t, có các d?ch v? h? tr? ngôn ng? mi?n phí dành cho b?n. G?i s? [phone].         [place]  [facility] complies with applicable Federal civil rights laws and does not discriminate on the basis of race, color, national origin, age, disability, or sex.

## 2017-03-23 NOTE — PROGRESS NOTES
Subjective:       Patient ID: Angel Rincon is a 87 y.o. female.    Chief Complaint: Follow-up (hospital follow up)    HPI:  Mrs. Rincon returns to the priority clinic for continued hospital follow-up.    She was previously hospitalized for shortness of breath related to cardiac decompensation.  She has remained compliant with her medications and is currently doing well.  She is not experiencing any signs or symptoms of volume overload.    She continues to complain of increasing abdominal girth.  Etiology of this is unclear.  She is concerned about having ovarian cancer, as this runs in her family.  At a recent emergency room visit to [facility], she had an abdominal CT scan which she reports showed only evidence of a left renal stone.  She also recently had an abdominal ultrasound performed which again showed no abnormality other than the known left renal stone.  She is eating well and not experiencing any weight loss.  She has no nausea or vomiting, abdominal pain, vaginal bleeding, or vaginal discharge.    Of note, today her left lower extremity is slightly swollen.  She reports that this is a chronic problem which happens intermittently.  She tells me that it is related to a previous knee injury and knee surgery.    Review of Systems  General ROS: negative for chills, fever or weight loss  Psychological ROS: negative for hallucination, depression or suicidal ideation  Ophthalmic ROS: negative for blurry vision, photophobia or eye pain  ENT ROS: negative for epistaxis, sore throat or rhinorrhea  Respiratory ROS: no cough, shortness of breath, or wheezing  Cardiovascular ROS: no chest pain or dyspnea on exertion  Gastrointestinal ROS: no abdominal pain, change in bowel habits, or black/ bloody stools  Genito-Urinary ROS: no dysuria, trouble voiding, or hematuria  Musculoskeletal ROS: negative for gait disturbance or muscular weakness  Neurological ROS: no syncope or seizures; no ataxia  Dermatological  ROS: negative for pruritis, rash and jaundice        Objective:      Vitals:    03/23/17 1030   BP: 133/69   Pulse: 91   Resp: 16   Temp: 98.1 °F (36.7 °C)     Physical Exam:  General appearance: alert, cooperative, no distress  Constitutional:Oriented to person, place, and time.appears well-developed and well-nourished.   HEENT: Normocephalic, atraumatic, neck symmetrical, no nasal discharge   Eyes: conjunctivae/corneas clear, PERRL, EOM's intact  Lungs: clear to auscultation bilaterally, no dullness to percussion bilaterally  Heart: regular rate and rhythm without rub; no displacement of the PMI   Abdomen: soft, non-tender; bowel sounds normoactive; no organomegaly; + increased abdominal girth  Extremities: extremities symmetric; no clubbing, cyanosis, +1 left lower extremity edema  Integument: Skin color, texture, turgor normal; no rashes; hair distrubution normal  Neurologic: Alert and oriented X 3, normal strength, normal coordination and gait  Psychiatric: no pressured speech; normal affect; no evidence of impaired cognition    Assessment:       1. Chronic diastolic heart failure    2. Chronic atrial fibrillation    3. Chronic anticoagulation    4. Essential hypertension    5. Polycythemia vera    6. Left nephrolithiasis    7. Hyperlipidemia, unspecified hyperlipidemia type        Plan:       Angel VICENTE was seen today for follow-up.    Diagnoses and all orders for this visit:    Chronic diastolic heart failure  -     Basic metabolic panel; labs reviewed with patient.  She reports compliance with heart failure appropriate medications.  No signs of volume overload or cardiac decompensation today.  Has an appointment with her primary cardiologist, Dr. Leos, today.     Chronic atrial fibrillation  Currently rate controlled.    Chronic anticoagulation  Stable on Xarelto.   No bleeding complications.      Essential hypertension  Blood pressure currently under good control on medication.    Polycythemia vera  -     CBC  auto differential; labs reviewed with patient  She has reestablished care with her oncologist, Dr. Hernandez.  Her next appointment is April 6, 2017.    Left nephrolithiasis  Nonobstructing renal stone noted.  Asymptomatic at this time.    Increasing abdominal girth  Patient had a CT of the abdomen at [facility] emergency department.  She reports that the only abnormality was a left-sided kidney stone.  I did not personally review this study.  The patient had an abdominal ultrasound within the [facility] system; etiology of increasing abdominal girth remains unclear.  Patient is concerned about ovarian cancer as this runs in her family.  She has a history of a total abdominal hysterectomy secondary to uterine fibroids.    She currently does not have any abdominal pain, vaginal bleeding, or vaginal discharge.  We'll continue to monitor.      Hyperlipidemia, unspecified hyperlipidemia type  -     rosuvastatin (CRESTOR) 10 MG tablet; Take 1 tablet (10 mg total) by mouth once daily.  Crestor refilled per patient request.       The patient will be discharged from the priority clinic and will resume care with her primary care physician.

## 2017-03-23 NOTE — PROGRESS NOTES
Subjective:       Patient ID: Angel Rincon is a 87 y.o. female.    Chief Complaint: Hospital  Follow-up; acute on chronic diastolic heart failure with volume overload    HPI:    Review of Systems  General ROS: negative for chills, fever or weight loss  Psychological ROS: negative for hallucination, depression or suicidal ideation  Ophthalmic ROS: negative for blurry vision, photophobia or eye pain  ENT ROS: negative for epistaxis, sore throat or rhinorrhea  Respiratory ROS: no cough, shortness of breath, or wheezing  Cardiovascular ROS: no chest pain or dyspnea on exertion  Gastrointestinal ROS: no abdominal pain, change in bowel habits, or black/ bloody stools  Genito-Urinary ROS: no dysuria, trouble voiding, or hematuria  Musculoskeletal ROS: negative for gait disturbance or muscular weakness  Neurological ROS: no syncope or seizures; no ataxia  Dermatological ROS: negative for pruritis, rash and jaundice        Objective:      Vitals:    03/23/17 1030   BP: 133/69   Pulse: 91   Resp: 16   Temp: 98.1 °F (36.7 °C)     Physical Exam:  General appearance: alert, cooperative, no distress  Constitutional:Oriented to person, place, and time.appears well-developed and well-nourished.   HEENT: Normocephalic, atraumatic, neck symmetrical, no nasal discharge   Eyes: conjunctivae/corneas clear, PERRL, EOM's intact  Lungs: clear to auscultation bilaterally, no dullness to percussion bilaterally  Heart: regular rate and rhythm without rub; no displacement of the PMI   Abdomen: soft, non-tender; bowel sounds normoactive; no organomegaly  Extremities: extremities symmetric; no clubbing, cyanosis, or edema  Integument: Skin color, texture, turgor normal; no rashes; hair distrubution normal  Neurologic: Alert and oriented X 3, normal strength, normal coordination and gait  Psychiatric: no pressured speech; normal affect; no evidence of impaired cognition    Assessment:       1. Chronic diastolic heart failure    2. Chronic atrial  fibrillation    3. Chronic anticoagulation    4. Essential hypertension    5. Polycythemia vera    6. Left nephrolithiasis    7. Hyperlipidemia, unspecified hyperlipidemia type        Plan:       Angel VICENTE was seen today for follow-up.    Diagnoses and all orders for this visit:    Chronic diastolic heart failure  -     Basic metabolic panel; Future    Chronic atrial fibrillation    Chronic anticoagulation    Essential hypertension    Polycythemia vera  -     CBC auto differential; Future    Left nephrolithiasis    Hyperlipidemia, unspecified hyperlipidemia type  -     rosuvastatin (CRESTOR) 10 MG tablet; Take 1 tablet (10 mg total) by mouth once daily.

## 2017-04-06 ENCOUNTER — OFFICE VISIT (OUTPATIENT)
Dept: DERMATOLOGY | Facility: CLINIC | Age: 82
End: 2017-04-06
Payer: MEDICARE

## 2017-04-06 DIAGNOSIS — L28.0 LSC (LICHEN SIMPLEX CHRONICUS): Primary | ICD-10-CM

## 2017-04-06 DIAGNOSIS — I78.1 TELANGIECTASIA: ICD-10-CM

## 2017-04-06 PROCEDURE — 1159F MED LIST DOCD IN RCRD: CPT | Mod: S$GLB,,, | Performed by: DERMATOLOGY

## 2017-04-06 PROCEDURE — 1160F RVW MEDS BY RX/DR IN RCRD: CPT | Mod: S$GLB,,, | Performed by: DERMATOLOGY

## 2017-04-06 PROCEDURE — 99213 OFFICE O/P EST LOW 20 MIN: CPT | Mod: S$GLB,,, | Performed by: DERMATOLOGY

## 2017-04-06 PROCEDURE — 99999 PR PBB SHADOW E&M-EST. PATIENT-LVL II: CPT | Mod: PBBFAC,,, | Performed by: DERMATOLOGY

## 2017-04-06 PROCEDURE — 1157F ADVNC CARE PLAN IN RCRD: CPT | Mod: S$GLB,,, | Performed by: DERMATOLOGY

## 2017-04-06 RX ORDER — HYDROCODONE BITARTRATE AND ACETAMINOPHEN 7.5; 325 MG/1; MG/1
1 TABLET ORAL EVERY 6 HOURS PRN
COMMUNITY

## 2017-04-06 RX ORDER — TRIAMCINOLONE ACETONIDE 1 MG/G
CREAM TOPICAL
Qty: 60 G | Refills: 1 | Status: SHIPPED | OUTPATIENT
Start: 2017-04-06

## 2017-04-06 NOTE — PROGRESS NOTES
Subjective:       Patient ID:  Angel Rincon is a 87 y.o. female who presents for   Chief Complaint   Patient presents with    Lesion     HPI Comments: Pt also c/o itchy skin on mid upper back. No prev tx.    Lesion  - Initial  Affected locations: nose  Duration: 6 months  Signs / symptoms: redness  Severity: mild  Timing: recurrent  Aggravated by: nothing  Relieving factors/Treatments tried: nothing        Review of Systems   Skin: Negative for daily sunscreen use.   Hematologic/Lymphatic: Bruises/bleeds easily.        Objective:    Physical Exam   Constitutional: She appears well-developed and well-nourished. No distress.   Neurological: She is alert and oriented to person, place, and time. She is not disoriented.   Psychiatric: She has a normal mood and affect.   Skin:   Areas Examined (abnormalities noted in diagram):   Head / Face Inspection Performed  Back Inspection Performed                   Diagram Legend     Erythematous scaling macule/papule c/w actinic keratosis       Vascular papule c/w angioma      Pigmented verrucoid papule/plaque c/w seborrheic keratosis      Yellow umbilicated papule c/w sebaceous hyperplasia      Irregularly shaped tan macule c/w lentigo     1-2 mm smooth white papules consistent with Milia      Movable subcutaneous cyst with punctum c/w epidermal inclusion cyst      Subcutaneous movable cyst c/w pilar cyst      Firm pink to brown papule c/w dermatofibroma      Pedunculated fleshy papule(s) c/w skin tag(s)      Evenly pigmented macule c/w junctional nevus     Mildly variegated pigmented, slightly irregular-bordered macule c/w mildly atypical nevus      Flesh colored to evenly pigmented papule c/w intradermal nevus       Pink pearly papule/plaque c/w basal cell carcinoma      Erythematous hyperkeratotic cursted plaque c/w SCC      Surgical scar with no sign of skin cancer recurrence      Open and closed comedones      Inflammatory papules and pustules      Verrucoid papule  consistent consistent with wart     Erythematous eczematous patches and plaques     Dystrophic onycholytic nail with subungual debris c/w onychomycosis     Umbilicated papule    Erythematous-base heme-crusted tan verrucoid plaque consistent with inflamed seborrheic keratosis     Erythematous Silvery Scaling Plaque c/w Psoriasis     See annotation      Assessment / Plan:        LSC (lichen simplex chronicus)  Discussed good skin care regimen including using a mild soap and moisturizing cream 1 - 2 times per day. Limit to one bath/shower per day and use lukewarm (not hot) water.     Discussed with patient the importance of not manipulating skin lesions. Trauma often exacerbates condition. Trimming nails is recommended to avoid puncturing skin.     Use ice to relieve intense pruritus.    -     triamcinolone acetonide 0.1% (KENALOG) 0.1 % cream; AAA bid to back prn itching. Not more than 2 weeks straight in the location  Dispense: 60 g; Refill: 1    Telangiectasia  These are benign vascular lesions that are inherited.  Treatment is not necessary.           No Follow-up on file.

## 2017-04-20 ENCOUNTER — OFFICE VISIT (OUTPATIENT)
Dept: INTERNAL MEDICINE | Facility: CLINIC | Age: 82
End: 2017-04-20
Payer: MEDICARE

## 2017-04-20 VITALS
WEIGHT: 119.5 LBS | HEIGHT: 55 IN | DIASTOLIC BLOOD PRESSURE: 68 MMHG | BODY MASS INDEX: 27.65 KG/M2 | HEART RATE: 86 BPM | SYSTOLIC BLOOD PRESSURE: 122 MMHG | OXYGEN SATURATION: 98 %

## 2017-04-20 DIAGNOSIS — I50.32 CHRONIC DIASTOLIC HEART FAILURE: Primary | ICD-10-CM

## 2017-04-20 DIAGNOSIS — D68.9 COAGULATION DEFECT: ICD-10-CM

## 2017-04-20 DIAGNOSIS — G25.3 MYOCLONIC JERKING: ICD-10-CM

## 2017-04-20 DIAGNOSIS — I48.91 ATRIAL FIBRILLATION, UNSPECIFIED TYPE: ICD-10-CM

## 2017-04-20 DIAGNOSIS — F11.20 NARCOTIC DEPENDENCE: ICD-10-CM

## 2017-04-20 DIAGNOSIS — I73.9 PERIPHERAL ARTERIAL DISEASE: ICD-10-CM

## 2017-04-20 PROCEDURE — 99215 OFFICE O/P EST HI 40 MIN: CPT | Mod: S$GLB,,, | Performed by: INTERNAL MEDICINE

## 2017-04-20 PROCEDURE — 1160F RVW MEDS BY RX/DR IN RCRD: CPT | Mod: S$GLB,,, | Performed by: INTERNAL MEDICINE

## 2017-04-20 PROCEDURE — 1126F AMNT PAIN NOTED NONE PRSNT: CPT | Mod: S$GLB,,, | Performed by: INTERNAL MEDICINE

## 2017-04-20 PROCEDURE — 1159F MED LIST DOCD IN RCRD: CPT | Mod: S$GLB,,, | Performed by: INTERNAL MEDICINE

## 2017-04-20 PROCEDURE — 99499 UNLISTED E&M SERVICE: CPT | Mod: S$GLB,,, | Performed by: INTERNAL MEDICINE

## 2017-04-20 PROCEDURE — 99999 PR PBB SHADOW E&M-EST. PATIENT-LVL III: CPT | Mod: PBBFAC,,, | Performed by: INTERNAL MEDICINE

## 2017-04-20 NOTE — MR AVS SNAPSHOT
[facility] Medicine   [facility] [place] [address]  Phone: [phone]  Fax: [phone]                  Angel Rincon   2017 2:20 PM   Office Visit    Description:  Female : 1929   Provider:  Pino Estrada MD   Department:  [facility]           Reason for Visit     Congestive Heart Failure           Diagnoses this Visit        Comments    Atrial fibrillation, unspecified type    -  Primary            To Do List           Future Appointments        Provider Department Dept Phone    6/15/2017 7:30 AM LAB, [facility] [phone]    2017 1:40 PM Pino Estrada MD [facility] [phone]      Goals (5 Years of Data)     None      [facility]     [facility] -  Assistance  Unless otherwise directed by your provider, please contact [facility], our nurse care line that is available for  assistance.     Registered nurses in the [facility] provide: appointment scheduling, clinical advisement, health education, and other advisory services.  Call: [phone] (toll free)               Medications           Message regarding Medications     Verify the changes and/or additions to your medication regime listed below are the same as discussed with your clinician today.  If any of these changes or additions are incorrect, please notify your healthcare provider.             Verify that the below list of medications is an accurate representation of the medications you are currently taking.  If none reported, the list may be blank. If incorrect, please contact your healthcare provider. Carry this list with you in case of emergency.           Current Medications     carvedilol (COREG) 6.25 MG tablet Take 1 tablet (6.25 mg total) by mouth 2 (two) times daily with meals.    diltiazem (CARDIZEM CD) 240 MG 24 hr capsule     furosemide (LASIX) 40 MG tablet Take 1  "tablet (40 mg total) by mouth once daily.    hydrocodone-acetaminophen 7.5-325mg (NORCO) 7.5-325 mg per tablet Take 1 tablet by mouth every 6 (six) hours as needed for Pain.    hydroxyurea (HYDREA) 500 mg Cap TAKE 1 CAPSULE TWICE DAILY    losartan (COZAAR) 50 MG tablet Take 1 tablet (50 mg total) by mouth once daily.    nitroGLYCERIN 0.4 MG/DOSE TL SPRY (NITROLINGUAL) 0.4 mg/dose spray     potassium chloride (KLOR-CON) 20 mEq Pack Take 20 mEq by mouth once daily.    rivaroxaban (XARELTO) 20 mg Tab Take by mouth once daily.     rosuvastatin (CRESTOR) 10 MG tablet Take 1 tablet (10 mg total) by mouth once daily.    triamcinolone acetonide 0.1% (KENALOG) 0.1 % cream AAA bid to back prn itching. Not more than 2 weeks straight in the location           Clinical Reference Information           Your Vitals Were     BP Pulse Height Weight SpO2 BMI    122/68 (BP Location: Right arm, Patient Position: Sitting, BP Method: Manual) 86 4' 7" (1.397 m) 54.2 kg (119 lb 7.8 oz) 98% 27.77 kg/m2      Blood Pressure          Most Recent Value    BP  122/68      Allergies as of 4/20/2017     Citalopram    Codeine    Sulfa (Sulfonamide Antibiotics)    Xanax [Alprazolam]      Immunizations Administered on Date of Encounter - 4/20/2017     None      Orders Placed During Today's Visit     Future Labs/Procedures Expected by Expires    Brain natriuretic peptide  4/20/2017 7/19/2017    Comprehensive metabolic panel  4/20/2017 7/19/2017      Instructions    Recommendations for today    If jerking movements worsen you should contact the clinic.  Signs that the jerking is becoming dangerous include jerking so significant that walking becomes difficult or if jerking motion causes a fall.    We recommend that you pay attention to dangerous symptoms while you are on several toe.  Dangerous symptoms include stomach pain that does not improve, black, red, or purple colored bowel movements.    Avoid taking any medication in the aspirin\NSAID therapy " without medical consultation.    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    If you begin to have constipation we recommend that you take proactive steps to prevent worsening of the condition.  Take a fiber supplement such as Citrucel as needed.    To prevent heart failure exacerbations continue checking daily weights.  If weight increases more than 5 pounds in a span of 2-3 days you should contact the clinic to make changes to your diuretic.               Smoking Cessation     If you would like to quit smoking:   You may be eligible for free services if you are a [place] resident and started smoking cigarettes before September 1, 1988.  Call the Smoking Cessation Trust ([facility]) toll free at [phone] [phone] or [phone]) 921-8026.   Call 1-192-QUIT-NOW if you do not meet the above criteria.   Contact us via email: [email or web address]   View our website for more information: [email or web address]        Language Assistance Services     ATTENTION: Language assistance services are available, free of charge. Please call [phone].      ATENCIÓN: Si habla español, tiene a devries disposición servicios gratuitos de asistencia lingüística. Llame al [phone].     LEANDRO Ý: N?u b?n nói Ti?ng Vi?t, có các d?ch v? h? tr? ngôn ng? mi?n phí dành cho b?n. G?i s? [phone].         [facility] Internal Medicine complies with applicable Federal civil rights laws and does not discriminate on the basis of race, color, national origin, age, disability, or sex.

## 2017-04-20 NOTE — PATIENT INSTRUCTIONS
Recommendations for today    If jerking movements worsen you should contact the clinic.  Signs that the jerking is becoming dangerous include jerking so significant that walking becomes difficult or if jerking motion causes a fall.    We recommend that you pay attention to dangerous symptoms while you are on several toe.  Dangerous symptoms include stomach pain that does not improve, black, red, or purple colored bowel movements.    Avoid taking any medication in the aspirin\NSAID therapy without medical consultation.    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    If you begin to have constipation we recommend that you take proactive steps to prevent worsening of the condition.  Take a fiber supplement such as Citrucel as needed.    To prevent heart failure exacerbations continue checking daily weights.  If weight increases more than 5 pounds in a span of 2-3 days you should contact the clinic to make changes to your diuretic.

## 2017-04-21 PROBLEM — I50.32 CHRONIC DIASTOLIC HEART FAILURE: Status: ACTIVE | Noted: 2017-03-02

## 2017-04-21 PROBLEM — F11.20 NARCOTIC DEPENDENCE: Status: ACTIVE | Noted: 2017-04-21

## 2017-04-21 NOTE — PROGRESS NOTES
Portions of this note are generated with voice recognition software. Typographical errors may exist.     SUBJECTIVE:    This is a/an 87 y.o. female here for primary care visit for  Chief Complaint   Patient presents with    Congestive Heart Failure     follow up     Patient states that she is following with pain management Dr. Haley.  Patient states that she is no longer taking Mirapex gabapentin or muscle relaxers.  States that she exclusively relies upon 1 tablet of narcotic medication in the evening.    States that myoclonic jerking does happen during waking hours and at nighttime but it is not as severe.  Patient is not bothered by these symptoms and is reluctant to pursue additional evaluation at this time.    Patient states that she is complying with anticoagulation.  Voices no internal bleeding symptoms.    Denies any claudication symptoms with ambulation at home.    States that she is complying with heart failure medications.      Medications Reviewed and Updated    Past medical, family, and social histories were reviewed and updated.    Review of Systems negative unless otherwise noted in history of present illness-   General ROS: negative  Psychological ROS: negative  ENT ROS: negative  Allergy and Immunology ROS: negative  Cardiovascular ROS: negative  Gastrointestinal ROS: negative  Genito-Urinary ROS: negative  Musculoskeletal ROS: negative  Neurological ROS: negative  Dermatological ROS: negative    Allergic:    Review of patient's allergies indicates:   Allergen Reactions    Citalopram      Other reaction(s): agitation    Codeine Nausea Only    Sulfa (sulfonamide antibiotics)      Other reaction(s): Swelling    Xanax [alprazolam] Other (See Comments)     Unspecified reaction       OBJECTIVE:  BP: 122/68 Pulse: 86    Wt Readings from Last 3 Encounters:   04/20/17 54.2 kg (119 lb 7.8 oz)   03/23/17 53.1 kg (117 lb)   03/09/17 53.5 kg (117 lb 15.1 oz)    Body mass index is 27.77 kg/(m^2).  Previous  Blood Pressure Readings :   BP Readings from Last 3 Encounters:   04/20/17 122/68   03/23/17 133/69   03/09/17 123/65       GEN: No apparent distress  HEENT: sclera non-icteric, conjunctiva clear  CV: 1+ peripheral edema bilaterally.  Regular rate and rhythm.   PULM: breathing non-labored  ABD: Obese, protuberant abdomen.  Supple abdomen.  Trace epigastric tenderness on palpation.  PSYCH: appropriate affect  MSK: able to rise from chair without assistance  SKIN: normal skin turgor    Pertinent Labs Reviewed       ASSESSMENT/PLAN:    Chronic diastolic heart failure.. Condition stable.  Counseling on self-care measures.  Continue current medical therapy.    Atrial fibrillation, unspecified type.. Condition stable.  Counseling on self-care measures.  Continue current medical therapy.  -     Comprehensive metabolic panel; Future; Expected date: 4/20/17  -     Brain natriuretic peptide; Future; Expected date: 4/20/17    Narcotic dependence.. Condition stable.  Counseling on self-care measures.  Continue current medical therapy.    Peripheral arterial disease.. Condition stable.  Counseling on self-care measures.  Continue current medical therapy.    Myoclonic jerking.  Stable.  Continue to monitor clinically    Coagulation defect.. Condition stable.  Counseling on self-care measures.  Continue current medical therapy.        Future Appointments  Date Time Provider Department Center   6/15/2017 7:30 AM LAB, [facility] [place]   6/20/2017 1:40 PM Pino Estrada MD [facility] Estrada  4/21/2017  5:01 PM

## 2017-05-01 ENCOUNTER — TELEPHONE (OUTPATIENT)
Dept: FAMILY MEDICINE | Facility: CLINIC | Age: 82
End: 2017-05-01

## 2017-05-01 NOTE — TELEPHONE ENCOUNTER
----- Message from Kaylen Tejeda sent at 5/1/2017 10:34 AM CDT -----  Contact: [phone]/ Pt's daughter Nori  Patient's would like to be seen on 05/04/2017. Patient's is having severe legs pain. Please advise.

## 2017-05-01 NOTE — TELEPHONE ENCOUNTER
Daughter reports mother's legs are swelling again. Instructed daughter to make sure her mother is taking the fluid pill furosemide as prescribed.

## 2017-05-03 ENCOUNTER — TELEPHONE (OUTPATIENT)
Dept: INTERNAL MEDICINE | Facility: CLINIC | Age: 82
End: 2017-05-03

## 2017-05-03 NOTE — TELEPHONE ENCOUNTER
Spoke with Nori-daughter. Nori reports she understandings the Cymbalta may not be the way to go with her mother. Nori reports she will discuss this with Dr. Estrada with next visit.

## 2017-05-03 NOTE — TELEPHONE ENCOUNTER
----- Message from Pino Estrada MD sent at 5/2/2017  6:07 PM CDT -----  That's a complicated question.  Cymbalta could have interactions with other medications so if behavior is very problematic they should come see me in clinic.    ----- Message -----     From: Hortencia Cherry LPN     Sent: 5/1/2017  11:01 AM       To: Pino Estrada MD    Daughter nori wants to know if her mother can restart the Cymbalta. Nori said she is really aggravated a lot lately.

## 2017-05-11 ENCOUNTER — TELEPHONE (OUTPATIENT)
Dept: INTERNAL MEDICINE | Facility: CLINIC | Age: 82
End: 2017-05-11

## 2017-05-11 NOTE — TELEPHONE ENCOUNTER
----- Message from Mamta Montalvo sent at 5/11/2017  2:17 PM CDT -----  Contact: Nori, daughter, [phone]  States patient's legs are very swollen and requests for patient to be seen by her doctor today if possible.Declined appt available on Monday at 8:40, requests a later time in the day due to patient's age and not able to get here so early. Please advise.

## 2017-05-11 NOTE — TELEPHONE ENCOUNTER
Spoke with pt's daughter to to inform that there were no cancellations today. Ms. Julio informed that she spoke with pt's cardiologist. Ms. Julio states that the cardiologist changed Diltiazem from 240mg to 180mg and changed the Lasix raised to 60mg. After speaking with him today he told Ms. Julio to stop giving pt Diltiazem completely and keep doing just the lasix. Ms. Julio asked to please schedule an appointment for 5/18/2017 just in case the pt's leg swelling doesn't go down. Understanding voiced.

## 2017-05-18 ENCOUNTER — TELEPHONE (OUTPATIENT)
Dept: INTERNAL MEDICINE | Facility: CLINIC | Age: 82
End: 2017-05-18

## 2017-05-18 ENCOUNTER — LAB VISIT (OUTPATIENT)
Dept: LAB | Facility: HOSPITAL | Age: 82
End: 2017-05-18
Attending: INTERNAL MEDICINE
Payer: MEDICARE

## 2017-05-18 ENCOUNTER — OFFICE VISIT (OUTPATIENT)
Dept: INTERNAL MEDICINE | Facility: CLINIC | Age: 82
End: 2017-05-18
Payer: MEDICARE

## 2017-05-18 VITALS
WEIGHT: 116.19 LBS | HEART RATE: 90 BPM | OXYGEN SATURATION: 97 % | BODY MASS INDEX: 26.89 KG/M2 | HEIGHT: 55 IN | DIASTOLIC BLOOD PRESSURE: 58 MMHG | SYSTOLIC BLOOD PRESSURE: 110 MMHG

## 2017-05-18 DIAGNOSIS — I50.32 CHRONIC DIASTOLIC HEART FAILURE: ICD-10-CM

## 2017-05-18 DIAGNOSIS — M81.0 AGE-RELATED OSTEOPOROSIS WITHOUT CURRENT PATHOLOGICAL FRACTURE: ICD-10-CM

## 2017-05-18 DIAGNOSIS — F51.04 CHRONIC INSOMNIA: ICD-10-CM

## 2017-05-18 DIAGNOSIS — M81.0 AGE-RELATED OSTEOPOROSIS WITHOUT CURRENT PATHOLOGICAL FRACTURE: Primary | ICD-10-CM

## 2017-05-18 DIAGNOSIS — F11.20 NARCOTIC DEPENDENCE: ICD-10-CM

## 2017-05-18 LAB
25(OH)D3+25(OH)D2 SERPL-MCNC: 16 NG/ML
ALBUMIN SERPL BCP-MCNC: 3.7 G/DL
ALP SERPL-CCNC: 95 U/L
ALT SERPL W/O P-5'-P-CCNC: 12 U/L
ANION GAP SERPL CALC-SCNC: 7 MMOL/L
AST SERPL-CCNC: 24 U/L
BILIRUB SERPL-MCNC: 1.2 MG/DL
BNP SERPL-MCNC: 414 PG/ML
BUN SERPL-MCNC: 14 MG/DL
CALCIUM SERPL-MCNC: 9.4 MG/DL
CHLORIDE SERPL-SCNC: 104 MMOL/L
CO2 SERPL-SCNC: 26 MMOL/L
CREAT SERPL-MCNC: 0.9 MG/DL
EST. GFR  (AFRICAN AMERICAN): >60 ML/MIN/1.73 M^2
EST. GFR  (NON AFRICAN AMERICAN): 57.7 ML/MIN/1.73 M^2
GLUCOSE SERPL-MCNC: 123 MG/DL
POTASSIUM SERPL-SCNC: 4 MMOL/L
PROT SERPL-MCNC: 6.7 G/DL
SODIUM SERPL-SCNC: 137 MMOL/L

## 2017-05-18 PROCEDURE — 80053 COMPREHEN METABOLIC PANEL: CPT

## 2017-05-18 PROCEDURE — 82306 VITAMIN D 25 HYDROXY: CPT

## 2017-05-18 PROCEDURE — 99499 UNLISTED E&M SERVICE: CPT | Mod: S$GLB,,, | Performed by: INTERNAL MEDICINE

## 2017-05-18 PROCEDURE — 1159F MED LIST DOCD IN RCRD: CPT | Mod: S$GLB,,, | Performed by: INTERNAL MEDICINE

## 2017-05-18 PROCEDURE — 99214 OFFICE O/P EST MOD 30 MIN: CPT | Mod: S$GLB,,, | Performed by: INTERNAL MEDICINE

## 2017-05-18 PROCEDURE — 99999 PR PBB SHADOW E&M-EST. PATIENT-LVL IV: CPT | Mod: PBBFAC,,, | Performed by: INTERNAL MEDICINE

## 2017-05-18 PROCEDURE — 36415 COLL VENOUS BLD VENIPUNCTURE: CPT | Mod: PO

## 2017-05-18 PROCEDURE — 83880 ASSAY OF NATRIURETIC PEPTIDE: CPT

## 2017-05-18 PROCEDURE — 1160F RVW MEDS BY RX/DR IN RCRD: CPT | Mod: S$GLB,,, | Performed by: INTERNAL MEDICINE

## 2017-05-18 PROCEDURE — 1157F ADVNC CARE PLAN IN RCRD: CPT | Mod: S$GLB,,, | Performed by: INTERNAL MEDICINE

## 2017-05-18 PROCEDURE — 1125F AMNT PAIN NOTED PAIN PRSNT: CPT | Mod: S$GLB,,, | Performed by: INTERNAL MEDICINE

## 2017-05-18 NOTE — MR AVS SNAPSHOT
[facility]   Issue  [place] [address]  Phone: [phone]  Fax: [phone]                  Angel Rincon   2017 3:20 PM   Office Visit    Description:  Female : 1929   Provider:  Pino Estrada MD   Department:  [facility]           Reason for Visit     Leg Swelling           Diagnoses this Visit        Comments    Age-related osteoporosis without current pathological fracture    -  Primary     Chronic diastolic heart failure                To Do List           Future Appointments        Provider Department Dept Phone    2017 4:15 PM [facility], [facility] [phone]    2017 10:00 AM MET, [facility] [phone]    2017 10:30 AM MET, [facility] [phone]    2017 1:40 PM Pino Estrada MD [facility] [phone]      Goals (5 Years of Data)     None      [facility]     [facility] Care [facility] -  Assistance  Unless otherwise directed by your provider, please contact [facility], our nurse care line that is available for  assistance.     Registered nurses in the [facility] provide: appointment scheduling, clinical advisement, health education, and other advisory services.  Call: [phone] (toll free)               Medications           Message regarding Medications     Verify the changes and/or additions to your medication regime listed below are the same as discussed with your clinician today.  If any of these changes or additions are incorrect, please notify your healthcare provider.        STOP taking these medications     diltiazem (CARDIZEM CD) 240 MG 24 hr capsule            Verify that the below list of medications is an accurate representation of the medications you are currently taking.  If none reported, the list may be blank. If incorrect, please contact your healthcare  "provider. Carry this list with you in case of emergency.           Current Medications     carvedilol (COREG) 6.25 MG tablet Take 1 tablet (6.25 mg total) by mouth 2 (two) times daily with meals.    furosemide (LASIX) 40 MG tablet Take 1 tablet (40 mg total) by mouth once daily.    hydrocodone-acetaminophen 7.5-325mg (NORCO) 7.5-325 mg per tablet Take 1 tablet by mouth every 6 (six) hours as needed for Pain.    hydroxyurea (HYDREA) 500 mg Cap TAKE 1 CAPSULE TWICE DAILY    losartan (COZAAR) 50 MG tablet Take 1 tablet (50 mg total) by mouth once daily.    nitroGLYCERIN 0.4 MG/DOSE TL SPRY (NITROLINGUAL) 0.4 mg/dose spray     potassium chloride (KLOR-CON) 20 mEq Pack Take 20 mEq by mouth once daily.    rivaroxaban (XARELTO) 20 mg Tab Take by mouth once daily.     rosuvastatin (CRESTOR) 10 MG tablet Take 1 tablet (10 mg total) by mouth once daily.    triamcinolone acetonide 0.1% (KENALOG) 0.1 % cream AAA bid to back prn itching. Not more than 2 weeks straight in the location           Clinical Reference Information           Your Vitals Were     BP Pulse Height Weight SpO2 BMI    110/58 (BP Location: Right arm, Patient Position: Sitting, BP Method: Manual) 90 4' 7" (1.397 m) 52.7 kg (116 lb 2.9 oz) 97% 27 kg/m2      Blood Pressure          Most Recent Value    BP  (!)  110/58      Allergies as of 5/18/2017     Citalopram    Codeine    Sulfa (Sulfonamide Antibiotics)    Xanax [Alprazolam]      Immunizations Administered on Date of Encounter - 5/18/2017     None      Orders Placed During Today's Visit     Future Labs/Procedures Expected by Expires    Brain natriuretic peptide  5/18/2017 8/16/2017    Comprehensive metabolic panel  5/18/2017 8/16/2017    DXA Bone Density Appendicular Skeleton  5/18/2017 5/18/2018    DXA Bone Density Spine And Hip  5/18/2017 5/18/2018    Vitamin D  5/18/2017 7/17/2018      Instructions      Recommendations for today    Consider searching in the bookstore for published books on managing " chronic pain from a psychological standpoint.    Continue to weigh yourself daily.  If you gain more than 4 pounds in 1 week he should contact my office or cardiology office    Continue to take calcium and vitamin D supplementation daily    If cardiologist changes any medication please contact my office so we can update our medication list                 Smoking Cessation     If you would like to quit smoking:   You may be eligible for free services if you are a [place] resident and started smoking cigarettes before September 1, 1988.  Call the Smoking Cessation Trust ([facility]) toll free at [phone] or [phone].   Call 1-800-QUIT-NOW if you do not meet the above criteria.   Contact us via email: [email or web address] ?[email or web address]   View our website for more information: [email or web address]        Language Assistance Services     ATTENTION: Language assistance services are available, free of charge. Please call [phone].      ATENCIÓN: Si habla español, tiene a devries disposición servicios gratuitos de asistencia lingüística. Llame al [phone].     [facility] Ý: N?u b?n nói Ti?ng Vi?t, có các d?ch v? h? tr? ngôn ng? mi?n phí dành cho b?n. G?i s? [phone].         [facility] Internal Medicine complies with applicable Federal civil rights laws and does not discriminate on the basis of race, color, national origin, age, disability, or sex.

## 2017-05-18 NOTE — PATIENT INSTRUCTIONS
Recommendations for today    Consider searching in the bookstore for published books on managing chronic pain from a psychological standpoint.    Continue to weigh yourself daily.  If you gain more than 4 pounds in 1 week he should contact my office or cardiology office    Continue to take calcium and vitamin D supplementation daily    If cardiologist changes any medication please contact my office so we can update our medication list

## 2017-05-18 NOTE — TELEPHONE ENCOUNTER
----- Message from Sulma Sanders sent at 5/18/2017  4:30 PM CDT -----  Contact: Nori Frost/ daughter/ [phone]  States they spoke to Dr. Leos and he is requesting if lab results can be faxed to him.        Fax/ [phone]

## 2017-05-19 ENCOUNTER — TELEPHONE (OUTPATIENT)
Dept: INTERNAL MEDICINE | Facility: CLINIC | Age: 82
End: 2017-05-19

## 2017-05-19 ENCOUNTER — PATIENT MESSAGE (OUTPATIENT)
Dept: INTERNAL MEDICINE | Facility: CLINIC | Age: 82
End: 2017-05-19

## 2017-05-19 DIAGNOSIS — E55.9 VITAMIN D DEFICIENCY: Primary | ICD-10-CM

## 2017-05-19 RX ORDER — ERGOCALCIFEROL 1.25 MG/1
50000 CAPSULE ORAL WEEKLY
Qty: 10 CAPSULE | Refills: 0 | Status: SHIPPED | OUTPATIENT
Start: 2017-05-19 | End: 2017-07-22

## 2017-05-19 NOTE — TELEPHONE ENCOUNTER
----- Message from Pino Estrada MD sent at 5/19/2017 10:42 AM CDT -----  Contact daughter and state that vitamin D levels have been very low on recent blood work.  Prescription vitamin D has been sent to the pharmacy.  We highly recommend that the patient pick this up and start taking the medication.  When it runs out they should call the office so we can give additional instructions.

## 2017-05-19 NOTE — TELEPHONE ENCOUNTER
Spoke with pt's daughter Ms. Julio to inform that vitamin d precipitation has been sent to pharmacy and instructions were given on how the pt should take the medication. Ms. Julio voiced understanding. Ms. Julio also asked if we received the message about sending a copy of pt's labs to cardiologist. Ms. Julio was informed that the message was received and a copy of the labs will be faxed today. Understanding voiced.

## 2017-05-25 ENCOUNTER — APPOINTMENT (OUTPATIENT)
Dept: RADIOLOGY | Facility: CLINIC | Age: 82
End: 2017-05-25
Attending: INTERNAL MEDICINE
Payer: MEDICARE

## 2017-05-25 DIAGNOSIS — M81.0 AGE-RELATED OSTEOPOROSIS WITHOUT CURRENT PATHOLOGICAL FRACTURE: ICD-10-CM

## 2017-05-25 PROCEDURE — 77080 DXA BONE DENSITY AXIAL: CPT | Mod: 26,,, | Performed by: INTERNAL MEDICINE

## 2017-05-25 PROCEDURE — 77080 DXA BONE DENSITY AXIAL: CPT | Mod: TC

## 2017-05-26 ENCOUNTER — TELEPHONE (OUTPATIENT)
Dept: INTERNAL MEDICINE | Facility: CLINIC | Age: 82
End: 2017-05-26

## 2017-05-26 ENCOUNTER — OFFICE VISIT (OUTPATIENT)
Dept: INTERNAL MEDICINE | Facility: CLINIC | Age: 82
End: 2017-05-26
Payer: MEDICARE

## 2017-05-26 VITALS
HEIGHT: 55 IN | HEART RATE: 108 BPM | DIASTOLIC BLOOD PRESSURE: 65 MMHG | SYSTOLIC BLOOD PRESSURE: 120 MMHG | BODY MASS INDEX: 27.4 KG/M2 | WEIGHT: 118.38 LBS | RESPIRATION RATE: 16 BRPM | TEMPERATURE: 100 F

## 2017-05-26 DIAGNOSIS — J06.9 UPPER RESPIRATORY TRACT INFECTION, UNSPECIFIED TYPE: Primary | ICD-10-CM

## 2017-05-26 PROCEDURE — 99213 OFFICE O/P EST LOW 20 MIN: CPT | Mod: S$GLB,,, | Performed by: FAMILY MEDICINE

## 2017-05-26 PROCEDURE — 1159F MED LIST DOCD IN RCRD: CPT | Mod: S$GLB,,, | Performed by: FAMILY MEDICINE

## 2017-05-26 PROCEDURE — 1125F AMNT PAIN NOTED PAIN PRSNT: CPT | Mod: S$GLB,,, | Performed by: FAMILY MEDICINE

## 2017-05-26 PROCEDURE — 99999 PR PBB SHADOW E&M-EST. PATIENT-LVL III: CPT | Mod: PBBFAC,,, | Performed by: FAMILY MEDICINE

## 2017-05-26 RX ORDER — AZITHROMYCIN 250 MG/1
TABLET, FILM COATED ORAL
Qty: 6 TABLET | Refills: 0 | Status: ON HOLD | OUTPATIENT
Start: 2017-05-26 | End: 2017-06-03 | Stop reason: HOSPADM

## 2017-05-26 NOTE — TELEPHONE ENCOUNTER
----- Message from Bernice Garcia sent at 5/26/2017  1:17 PM CDT -----  Contact: [phone]/ tammy pt's daughter   Pt's daughter called stating pt its having trouble taking deep breaths . Pt's daughter wants to know if she should take her to the ER .Please advise

## 2017-05-26 NOTE — TELEPHONE ENCOUNTER
Spoke with pt's daughter Nori who was able to get someone to cover her shift so she can bring the pt to see a doctor. Pt will be seen on 5/26/2017 at 4:20pm with Dr. Kidd. Ms. Julio voiced understanding.

## 2017-05-26 NOTE — TELEPHONE ENCOUNTER
----- Message from Bernice Garcia sent at 5/26/2017  1:35 PM CDT -----  Contact: [phone]   Pt's daughter its requesting to speak with you in regarding bringing the pt in . Please advise

## 2017-05-26 NOTE — TELEPHONE ENCOUNTER
Spoke with pt's daughter Nori who states that for the past 2 days pt has be unable to take deep breaths. Every time pt goes to do so she starts to cough really bad. Ms. Julio was offered an Appointment at the [facility] for this afternoon and an appointment with the urgent care clinic located at [place] on 5/27/2017. Ms. Julio informed that she is stuck at work at this time and will try and get someone to take over so she bring pt to be seen today if possible. Understanding voiced.

## 2017-05-27 NOTE — PROGRESS NOTES
Subjective:   Patient ID: Angel Rincon is a 87 y.o. female.    Chief Complaint: Shortness of Breath; Fever; Fatigue; and Cough      Cough   This is a new problem. The current episode started in the past 7 days. The problem has been gradually worsening. The problem occurs constantly. The cough is productive of sputum and productive of purulent sputum. Associated symptoms include nasal congestion, postnasal drip, a sore throat and shortness of breath. Pertinent negatives include no chest pain, chills, ear congestion, ear pain, fever, headaches, heartburn, hemoptysis, myalgias, rash, rhinorrhea, sweats, weight loss or wheezing. The symptoms are aggravated by dust, fumes, lying down and pollens. She has tried nothing for the symptoms. The treatment provided no relief. Her past medical history is significant for pneumonia. There is no history of asthma, bronchiectasis, bronchitis, COPD, emphysema or environmental allergies.       Patient queried and denies any further complaints.      ALLERGIES AND MEDICATIONS: updated and reviewed.  Review of patient's allergies indicates:   Allergen Reactions    Citalopram      Other reaction(s): agitation    Codeine Nausea Only    Sulfa (sulfonamide antibiotics)      Other reaction(s): Swelling    Xanax [alprazolam] Other (See Comments)     Unspecified reaction       Current Outpatient Prescriptions:     carvedilol (COREG) 6.25 MG tablet, Take 1 tablet (6.25 mg total) by mouth 2 (two) times daily with meals., Disp: 60 tablet, Rfl: 11    ergocalciferol (ERGOCALCIFEROL) 50,000 unit Cap, Take 1 capsule (50,000 Units total) by mouth once a week., Disp: 10 capsule, Rfl: 0    furosemide (LASIX) 40 MG tablet, Take 1 tablet (40 mg total) by mouth once daily. (Patient taking differently: Take 60 mg by mouth once daily. ), Disp: 90 tablet, Rfl: 6    hydrocodone-acetaminophen 7.5-325mg (NORCO) 7.5-325 mg per tablet, Take 1 tablet by mouth every 6 (six) hours as needed for Pain., Disp: ,  "Rfl:     hydroxyurea (HYDREA) 500 mg Cap, TAKE 1 CAPSULE TWICE DAILY, Disp: 60 capsule, Rfl: 5    losartan (COZAAR) 50 MG tablet, Take 1 tablet (50 mg total) by mouth once daily., Disp: , Rfl:     nitroGLYCERIN 0.4 MG/DOSE TL SPRY (NITROLINGUAL) 0.4 mg/dose spray, , Disp: , Rfl:     potassium chloride (KLOR-CON) 20 mEq Pack, Take 20 mEq by mouth once daily., Disp: 30 packet, Rfl: 6    rivaroxaban (XARELTO) 20 mg Tab, Take by mouth once daily. , Disp: , Rfl:     rosuvastatin (CRESTOR) 10 MG tablet, Take 1 tablet (10 mg total) by mouth once daily., Disp: 90 tablet, Rfl: 3    triamcinolone acetonide 0.1% (KENALOG) 0.1 % cream, AAA bid to back prn itching. Not more than 2 weeks straight in the location, Disp: 60 g, Rfl: 1    azithromycin (Z-RAINE) 250 MG tablet, Take 2 tablets by mouth on day 1; Take 1 tablet by mouth on days 2-5, Disp: 6 tablet, Rfl: 0    Review of Systems   Constitutional: Negative for chills, fever and weight loss.   HENT: Positive for postnasal drip and sore throat. Negative for ear pain and rhinorrhea.    Respiratory: Positive for cough and shortness of breath. Negative for hemoptysis and wheezing.    Cardiovascular: Negative for chest pain and palpitations.   Gastrointestinal: Negative for heartburn.   Musculoskeletal: Negative for myalgias.   Skin: Negative for rash.   Allergic/Immunologic: Negative for environmental allergies.   Neurological: Negative for headaches.       Objective:     Vitals:    05/26/17 1637   BP: 120/65   Pulse: 108   Resp: 16   Temp: 100.1 °F (37.8 °C)   TempSrc: Oral   Weight: 53.7 kg (118 lb 6.2 oz)   Height: 4' 7" (1.397 m)   PainSc:   7     Body mass index is 27.52 kg/m².    Physical Exam   Constitutional: She appears well-developed and well-nourished.   HENT:   Head: Normocephalic and atraumatic.   Right Ear: External ear normal.   Left Ear: External ear normal.   Nose: Nose normal.   Mouth/Throat: Oropharynx is clear and moist.   Cardiovascular: Normal rate and " regular rhythm.    Pulmonary/Chest: Effort normal and breath sounds normal. No respiratory distress. She has no wheezes. She has no rales.   Skin: Skin is warm and dry.   Psychiatric: She has a normal mood and affect. Her behavior is normal.   Nursing note and vitals reviewed.      Assessment and Plan:   Angel VICENTE was seen today for shortness of breath, fever, fatigue and cough.    Diagnoses and all orders for this visit:    Upper respiratory tract infection, unspecified type    Other orders  -     azithromycin (Z-RAINE) 250 MG tablet; Take 2 tablets by mouth on day 1; Take 1 tablet by mouth on days 2-5    Robitussin as directed, hydrate, tylenol prn pain as directed.    Return in about 1 week (around 6/2/2017), or if symptoms worsen or fail to improve.    THIS NOTE WILL BE SHARED WITH THE PATIENT.

## 2017-05-29 ENCOUNTER — TELEPHONE (OUTPATIENT)
Dept: INTERNAL MEDICINE | Facility: CLINIC | Age: 82
End: 2017-05-29

## 2017-05-29 ENCOUNTER — HOSPITAL ENCOUNTER (INPATIENT)
Facility: HOSPITAL | Age: 82
LOS: 5 days | Discharge: HOME HEALTH | DRG: 682 | End: 2017-06-03
Attending: EMERGENCY MEDICINE | Admitting: INTERNAL MEDICINE
Payer: MEDICARE

## 2017-05-29 DIAGNOSIS — E86.0 DEHYDRATION: ICD-10-CM

## 2017-05-29 DIAGNOSIS — E87.20 METABOLIC ACIDOSIS: ICD-10-CM

## 2017-05-29 DIAGNOSIS — N17.9 AKI (ACUTE KIDNEY INJURY): Primary | ICD-10-CM

## 2017-05-29 DIAGNOSIS — E87.1 HYPONATREMIA: ICD-10-CM

## 2017-05-29 DIAGNOSIS — I50.33 ACUTE ON CHRONIC DIASTOLIC HEART FAILURE: ICD-10-CM

## 2017-05-29 DIAGNOSIS — N30.00 ACUTE CYSTITIS WITHOUT HEMATURIA: ICD-10-CM

## 2017-05-29 LAB
ALBUMIN SERPL BCP-MCNC: 2.9 G/DL
ALP SERPL-CCNC: 95 U/L
ALT SERPL W/O P-5'-P-CCNC: 22 U/L
ANION GAP SERPL CALC-SCNC: 11 MMOL/L
ANISOCYTOSIS BLD QL SMEAR: ABNORMAL
AST SERPL-CCNC: 41 U/L
BACTERIA #/AREA URNS HPF: ABNORMAL /HPF
BASOPHILS # BLD AUTO: ABNORMAL K/UL
BASOPHILS NFR BLD: 0 %
BILIRUB SERPL-MCNC: 2.3 MG/DL
BILIRUB UR QL STRIP: ABNORMAL
BNP SERPL-MCNC: 403 PG/ML
BUN SERPL-MCNC: 20 MG/DL
BURR CELLS BLD QL SMEAR: ABNORMAL
CALCIUM SERPL-MCNC: 8.4 MG/DL
CHLORIDE SERPL-SCNC: 102 MMOL/L
CLARITY UR: ABNORMAL
CO2 SERPL-SCNC: 15 MMOL/L
COLOR UR: ABNORMAL
CREAT SERPL-MCNC: 1.6 MG/DL
CREAT UR-MCNC: 255.6 MG/DL
DACRYOCYTES BLD QL SMEAR: ABNORMAL
DIFFERENTIAL METHOD: ABNORMAL
EOSINOPHIL # BLD AUTO: ABNORMAL K/UL
EOSINOPHIL NFR BLD: 0 %
ERYTHROCYTE [DISTWIDTH] IN BLOOD BY AUTOMATED COUNT: 19.8 %
EST. GFR  (AFRICAN AMERICAN): 33 ML/MIN/1.73 M^2
EST. GFR  (NON AFRICAN AMERICAN): 29 ML/MIN/1.73 M^2
GLUCOSE SERPL-MCNC: 136 MG/DL
GLUCOSE UR QL STRIP: NEGATIVE
HCT VFR BLD AUTO: 35 %
HGB BLD-MCNC: 10.1 G/DL
HGB UR QL STRIP: ABNORMAL
HYALINE CASTS #/AREA URNS LPF: 4 /LPF
HYPOCHROMIA BLD QL SMEAR: ABNORMAL
KETONES UR QL STRIP: ABNORMAL
LACTATE SERPL-SCNC: 1 MMOL/L
LEUKOCYTE ESTERASE UR QL STRIP: NEGATIVE
LYMPHOCYTES # BLD AUTO: ABNORMAL K/UL
LYMPHOCYTES NFR BLD: 5 %
MCH RBC QN AUTO: 23.2 PG
MCHC RBC AUTO-ENTMCNC: 28.9 %
MCV RBC AUTO: 81 FL
MICROSCOPIC COMMENT: ABNORMAL
MONOCYTES # BLD AUTO: ABNORMAL K/UL
MONOCYTES NFR BLD: 0 %
NEUTROPHILS NFR BLD: 95 %
NITRITE UR QL STRIP: NEGATIVE
NRBC BLD-RTO: ABNORMAL /100 WBC
OVALOCYTES BLD QL SMEAR: ABNORMAL
PH UR STRIP: 6 [PH] (ref 5–8)
PLATELET # BLD AUTO: 372 K/UL
PLATELET BLD QL SMEAR: ABNORMAL
PMV BLD AUTO: ABNORMAL FL
POIKILOCYTOSIS BLD QL SMEAR: ABNORMAL
POLYCHROMASIA BLD QL SMEAR: ABNORMAL
POTASSIUM SERPL-SCNC: 4 MMOL/L
PROCALCITONIN SERPL IA-MCNC: 1.11 NG/ML
PROT SERPL-MCNC: 5.6 G/DL
PROT UR QL STRIP: ABNORMAL
RBC # BLD AUTO: 4.35 M/UL
RBC #/AREA URNS HPF: 0 /HPF (ref 0–4)
SODIUM SERPL-SCNC: 128 MMOL/L
SODIUM UR-SCNC: <20 MMOL/L
SP GR UR STRIP: 1.02 (ref 1–1.03)
TROPONIN I SERPL DL<=0.01 NG/ML-MCNC: 0.02 NG/ML
TSH SERPL DL<=0.005 MIU/L-ACNC: 1.77 UIU/ML
URN SPEC COLLECT METH UR: ABNORMAL
UROBILINOGEN UR STRIP-ACNC: ABNORMAL EU/DL
UUN UR-MCNC: 242 MG/DL
WBC # BLD AUTO: 20.7 K/UL
WBC #/AREA URNS HPF: 3 /HPF (ref 0–5)

## 2017-05-29 PROCEDURE — 87086 URINE CULTURE/COLONY COUNT: CPT

## 2017-05-29 PROCEDURE — 84300 ASSAY OF URINE SODIUM: CPT

## 2017-05-29 PROCEDURE — 84540 ASSAY OF URINE/UREA-N: CPT

## 2017-05-29 PROCEDURE — 84484 ASSAY OF TROPONIN QUANT: CPT

## 2017-05-29 PROCEDURE — 63600175 PHARM REV CODE 636 W HCPCS: Performed by: HOSPITALIST

## 2017-05-29 PROCEDURE — 99285 EMERGENCY DEPT VISIT HI MDM: CPT | Mod: 25

## 2017-05-29 PROCEDURE — 96361 HYDRATE IV INFUSION ADD-ON: CPT

## 2017-05-29 PROCEDURE — 85027 COMPLETE CBC AUTOMATED: CPT

## 2017-05-29 PROCEDURE — 87040 BLOOD CULTURE FOR BACTERIA: CPT | Mod: 59

## 2017-05-29 PROCEDURE — 80053 COMPREHEN METABOLIC PANEL: CPT

## 2017-05-29 PROCEDURE — 82570 ASSAY OF URINE CREATININE: CPT

## 2017-05-29 PROCEDURE — 81000 URINALYSIS NONAUTO W/SCOPE: CPT

## 2017-05-29 PROCEDURE — 83605 ASSAY OF LACTIC ACID: CPT

## 2017-05-29 PROCEDURE — 25000003 PHARM REV CODE 250: Performed by: HOSPITALIST

## 2017-05-29 PROCEDURE — 96365 THER/PROPH/DIAG IV INF INIT: CPT

## 2017-05-29 PROCEDURE — 84443 ASSAY THYROID STIM HORMONE: CPT

## 2017-05-29 PROCEDURE — 85007 BL SMEAR W/DIFF WBC COUNT: CPT

## 2017-05-29 PROCEDURE — 83880 ASSAY OF NATRIURETIC PEPTIDE: CPT

## 2017-05-29 PROCEDURE — 84145 PROCALCITONIN (PCT): CPT

## 2017-05-29 PROCEDURE — 94761 N-INVAS EAR/PLS OXIMETRY MLT: CPT

## 2017-05-29 PROCEDURE — 93005 ELECTROCARDIOGRAM TRACING: CPT

## 2017-05-29 PROCEDURE — 25000003 PHARM REV CODE 250: Performed by: EMERGENCY MEDICINE

## 2017-05-29 PROCEDURE — 11000001 HC ACUTE MED/SURG PRIVATE ROOM

## 2017-05-29 RX ORDER — HYDROCODONE BITARTRATE AND ACETAMINOPHEN 7.5; 325 MG/1; MG/1
1 TABLET ORAL NIGHTLY PRN
Status: DISCONTINUED | OUTPATIENT
Start: 2017-05-29 | End: 2017-06-02

## 2017-05-29 RX ORDER — SODIUM CHLORIDE, SODIUM LACTATE, POTASSIUM CHLORIDE, CALCIUM CHLORIDE 600; 310; 30; 20 MG/100ML; MG/100ML; MG/100ML; MG/100ML
INJECTION, SOLUTION INTRAVENOUS CONTINUOUS
Status: ACTIVE | OUTPATIENT
Start: 2017-05-29 | End: 2017-05-30

## 2017-05-29 RX ORDER — IBUPROFEN 200 MG
16 TABLET ORAL
Status: DISCONTINUED | OUTPATIENT
Start: 2017-05-29 | End: 2017-06-03 | Stop reason: HOSPADM

## 2017-05-29 RX ORDER — GLUCAGON 1 MG
1 KIT INJECTION
Status: DISCONTINUED | OUTPATIENT
Start: 2017-05-29 | End: 2017-06-03 | Stop reason: HOSPADM

## 2017-05-29 RX ORDER — ROSUVASTATIN CALCIUM 10 MG/1
10 TABLET, COATED ORAL DAILY
Status: DISCONTINUED | OUTPATIENT
Start: 2017-05-30 | End: 2017-06-03 | Stop reason: HOSPADM

## 2017-05-29 RX ORDER — IBUPROFEN 200 MG
24 TABLET ORAL
Status: DISCONTINUED | OUTPATIENT
Start: 2017-05-29 | End: 2017-06-03 | Stop reason: HOSPADM

## 2017-05-29 RX ORDER — HYDROXYUREA 500 MG/1
500 CAPSULE ORAL DAILY
Status: DISCONTINUED | OUTPATIENT
Start: 2017-05-30 | End: 2017-06-03 | Stop reason: HOSPADM

## 2017-05-29 RX ORDER — AZITHROMYCIN 250 MG/1
250 TABLET, FILM COATED ORAL DAILY
Status: DISCONTINUED | OUTPATIENT
Start: 2017-05-30 | End: 2017-05-31

## 2017-05-29 RX ORDER — CARVEDILOL 6.25 MG/1
6.25 TABLET ORAL 2 TIMES DAILY WITH MEALS
Status: DISCONTINUED | OUTPATIENT
Start: 2017-05-29 | End: 2017-06-03 | Stop reason: HOSPADM

## 2017-05-29 RX ADMIN — CARVEDILOL 6.25 MG: 6.25 TABLET, FILM COATED ORAL at 08:05

## 2017-05-29 RX ADMIN — HYDROCODONE BITARTRATE AND ACETAMINOPHEN 1 TABLET: 7.5; 325 TABLET ORAL at 11:05

## 2017-05-29 RX ADMIN — SODIUM CHLORIDE, SODIUM LACTATE, POTASSIUM CHLORIDE, AND CALCIUM CHLORIDE 1000 ML: .6; .31; .03; .02 INJECTION, SOLUTION INTRAVENOUS at 10:05

## 2017-05-29 RX ADMIN — SODIUM CHLORIDE 500 ML: 0.9 INJECTION, SOLUTION INTRAVENOUS at 06:05

## 2017-05-29 RX ADMIN — CEFTRIAXONE 1 G: 1 INJECTION, SOLUTION INTRAVENOUS at 08:05

## 2017-05-29 NOTE — TELEPHONE ENCOUNTER
----- Message from Jazmín Winn sent at 5/29/2017 12:34 PM CDT -----  Contact: Nori/[phone]  Nori said she would like to speak with you regarding the patient. Please advise

## 2017-05-29 NOTE — TELEPHONE ENCOUNTER
----- Message from Ayan Alegre sent at 5/29/2017  1:56 PM CDT -----  Contact: [phone]/self  Patient is returning your call. Please advise

## 2017-05-29 NOTE — ED NOTES
Patient went to restroom by wheelchair. Pt felt good enough to walk but RN recommended wheelchair for precaution

## 2017-05-29 NOTE — ED TRIAGE NOTES
Pt comes into ER with complaints of generalized weakness for 3 weeks. Denies N/V. Pt states she has had diarrhea due to antibiotics (2 doses left) and also has a sore throat. Daughter is at bedside. From out of town. Pt and daughter unsure why she is on antibiotics. Pt states they told her she had allergies. Pt states she is on Lasix and Friday they increased her dose. History of CHF.

## 2017-05-29 NOTE — ED NOTES
Updated patient on plan of care. Xray will be in shortly. Turned lights off for comfort and turned the tv on

## 2017-05-29 NOTE — ED PROVIDER NOTES
Encounter Date: 5/29/2017       History     Chief Complaint   Patient presents with    Fatigue     increase in weakness with cough and low grade fever x 2 weeks, currently on antibiotics     Review of patient's allergies indicates:   Allergen Reactions    Citalopram      Other reaction(s): agitation    Codeine Nausea Only    Sulfa (sulfonamide antibiotics)      Other reaction(s): Swelling    Xanax [alprazolam] Other (See Comments)     Unspecified reaction     HPI   Angel Rincon is a 87 y.o. female who has a past medical history of Angina of effort; Arthritis; Back pain; Cancer; Hyperlipidemia; Hypertension; Osteoporosis; Polycythemia; and Restless leg who presents to the Emergency Department with fatigue. Symptoms began 1-2 weeks ago, gradual onset, worsening. Symptoms are associated with cough, dyspnea, sore throat, congestion, low grade temperature, abdominal distension, and are not associated with headache, focal weakness, vomiting, changes in urinary habits. Symptoms are aggravated by nothing, and relieved by nothing. Pt saw her PCP about a week ago and was placed on z-pack and had lasix increased. Additional hx provided by daughter who states that the patient has not been drinking enough water the past week. Pt has a past surgical history that includes Tonsillectomy; Hysterectomy; Angioplasty; and Coronary artery bypass graft.      Past Medical History:   Diagnosis Date    Angina of effort     Arthritis     Back pain     Cancer     Hyperlipidemia     Hypertension     Osteoporosis     Polycythemia     Restless leg      Past Surgical History:   Procedure Laterality Date    ANGIOPLASTY      CORONARY ARTERY BYPASS GRAFT      HYSTERECTOMY      TONSILLECTOMY       Family History   Problem Relation Age of Onset    Adopted: Yes    Cancer Daughter      soft-tissue sarcoma    Melanoma Neg Hx      Social History   Substance Use Topics    Smoking status: Never Smoker    Smokeless tobacco: Current  User     Types: Snuff    Alcohol use 1.2 oz/week     2 Cans of beer per week     Review of Systems   Constitutional: Positive for activity change, fatigue and fever.   HENT: Positive for congestion and sore throat.    Respiratory: Positive for cough and shortness of breath. Negative for chest tightness.    Cardiovascular: Negative for chest pain.   Gastrointestinal: Positive for abdominal distention and diarrhea (loose stool x2 with abx). Negative for abdominal pain and nausea.   Genitourinary: Negative for dysuria.   Musculoskeletal: Negative for back pain.   Skin: Negative for rash.   Neurological: Negative for weakness.   Hematological: Does not bruise/bleed easily.       Physical Exam     Initial Vitals [05/29/17 1614]   BP Pulse Resp Temp SpO2   110/66 106 18 99.7 °F (37.6 °C) 97 %     Physical Exam    Nursing note and vitals reviewed.  Constitutional: She appears well-developed and well-nourished. She is not diaphoretic. No distress.   HENT:   Head: Normocephalic and atraumatic.   Mouth/Throat: Oropharynx is clear and moist.   Eyes: Conjunctivae are normal. Pupils are equal, round, and reactive to light.   Neck: Neck supple.   Cardiovascular: Normal rate, regular rhythm and intact distal pulses.   No murmur heard.  Pulmonary/Chest: Breath sounds normal. No respiratory distress. She has no wheezes. She has no rhonchi. She has no rales.   Abdominal: Soft. Bowel sounds are normal. She exhibits no distension. There is no tenderness. There is no guarding.   Musculoskeletal: Normal range of motion. She exhibits edema (2+ bilateral lower extremity to mid-leg). She exhibits no tenderness.   Neurological: She is alert and oriented to person, place, and time.   Skin: Skin is warm and dry. Capillary refill takes less than 2 seconds. No rash noted.   Psychiatric: She has a normal mood and affect.         ED Course   Procedures  Labs Reviewed   CBC W/ AUTO DIFFERENTIAL - Abnormal; Notable for the following:        Result  Value    Hemoglobin 10.1 (*)     Hematocrit 35.0 (*)     MCV 81 (*)     MCH 23.2 (*)     MCHC 28.9 (*)     RDW 19.8 (*)     All other components within normal limits   COMPREHENSIVE METABOLIC PANEL - Abnormal; Notable for the following:     Sodium 128 (*)     CO2 15 (*)     Glucose 136 (*)     Creatinine 1.6 (*)     Calcium 8.4 (*)     Total Protein 5.6 (*)     Albumin 2.9 (*)     Total Bilirubin 2.3 (*)     AST 41 (*)     eGFR if  33 (*)     eGFR if non  29 (*)     All other components within normal limits   URINALYSIS - Abnormal; Notable for the following:     Color, UA Orange (*)     Appearance, UA Cloudy (*)     Protein, UA 2+ (*)     Ketones, UA Trace (*)     Bilirubin (UA) 2+ (*)     Occult Blood UA Trace (*)     Urobilinogen, UA 2.0-3.0 (*)     All other components within normal limits   B-TYPE NATRIURETIC PEPTIDE - Abnormal; Notable for the following:      (*)     All other components within normal limits   URINALYSIS MICROSCOPIC - Abnormal; Notable for the following:     Bacteria, UA Moderate (*)     Hyaline Casts, UA 4 (*)     All other components within normal limits   TROPONIN I   TSH   LACTIC ACID, PLASMA     EKG Readings: (Independently Interpreted)   Initial Reading: No STEMI.     EKG: afib with RVR at 104 bpm, nl axis, nl intervals, low voltage QRS, no ST-T changes as read by me.  Rate increased in comparison to previous EKG on 3/1/17.           Medical Decision Making:   Initial Assessment:   This is an emergent evaluation of a 87 y.o.female patient with presentation of fatigue, cough, shortness of breath   Initial differentials include but are not limited to: Pneumonia, dehydration, acute kidney injury, metabolic disturbance, electrolyte abnormality, CHF, arrhythmia  Plan: Basic labs, EKG, troponin, BNP, chest x-ray, cardiac monitoring    Clinical Tests:   Lab Tests: Ordered and Reviewed  Radiological Study: Ordered and Reviewed  Medical Tests: Reviewed and  Ordered  ED Management:  Labs reviewed by me.  Patient has elevated creatinine, low bicarbonate consistent with acute kidney injury and metabolic acidosis.  Patient also has elevated BNP, however normal troponin. WBC count elevated chronically, though worse today. May be secondary to infection vs dehydration/hemoconcentration.    Patient will likely need observation the hospital for trending of creatinine, management of Lasix and fluid balance.     1810 - Case discussed with [facility] medicine team who will come down to evaluate in the ED.                   ED Course     Clinical Impression:   The primary encounter diagnosis was SAI (acute kidney injury). Diagnoses of Metabolic acidosis and Dehydration were also pertinent to this visit.    Disposition:   Disposition: Placed in Observation  Condition: Stable       Collins Gann MD  05/29/17 1818       Collins Gann MD  05/29/17 1836

## 2017-05-30 ENCOUNTER — TELEPHONE (OUTPATIENT)
Dept: INTERNAL MEDICINE | Facility: CLINIC | Age: 82
End: 2017-05-30

## 2017-05-30 LAB
ALBUMIN SERPL BCP-MCNC: 2.6 G/DL
ALP SERPL-CCNC: 84 U/L
ALT SERPL W/O P-5'-P-CCNC: 18 U/L
ANION GAP SERPL CALC-SCNC: 10 MMOL/L
ANION GAP SERPL CALC-SCNC: 11 MMOL/L
ANISOCYTOSIS BLD QL SMEAR: ABNORMAL
AORTIC VALVE REGURGITATION: ABNORMAL
AST SERPL-CCNC: 27 U/L
BASOPHILS # BLD AUTO: ABNORMAL K/UL
BASOPHILS NFR BLD: 0 %
BILIRUB SERPL-MCNC: 1.9 MG/DL
BUN SERPL-MCNC: 21 MG/DL
BUN SERPL-MCNC: 22 MG/DL
CALCIUM SERPL-MCNC: 8 MG/DL
CALCIUM SERPL-MCNC: 8.3 MG/DL
CHLORIDE SERPL-SCNC: 102 MMOL/L
CHLORIDE SERPL-SCNC: 102 MMOL/L
CO2 SERPL-SCNC: 16 MMOL/L
CO2 SERPL-SCNC: 17 MMOL/L
CREAT SERPL-MCNC: 1.5 MG/DL
CREAT SERPL-MCNC: 1.5 MG/DL
DACRYOCYTES BLD QL SMEAR: ABNORMAL
DIASTOLIC DYSFUNCTION: NO
DIFFERENTIAL METHOD: ABNORMAL
EOSINOPHIL # BLD AUTO: ABNORMAL K/UL
EOSINOPHIL NFR BLD: 0 %
ERYTHROCYTE [DISTWIDTH] IN BLOOD BY AUTOMATED COUNT: 19.7 %
EST. GFR  (AFRICAN AMERICAN): 36 ML/MIN/1.73 M^2
EST. GFR  (AFRICAN AMERICAN): 36 ML/MIN/1.73 M^2
EST. GFR  (NON AFRICAN AMERICAN): 31 ML/MIN/1.73 M^2
EST. GFR  (NON AFRICAN AMERICAN): 31 ML/MIN/1.73 M^2
ESTIMATED PA SYSTOLIC PRESSURE: 37.09
GIANT PLATELETS BLD QL SMEAR: PRESENT
GLUCOSE SERPL-MCNC: 103 MG/DL
GLUCOSE SERPL-MCNC: 113 MG/DL
HCT VFR BLD AUTO: 31.9 %
HGB BLD-MCNC: 9.3 G/DL
HYPOCHROMIA BLD QL SMEAR: ABNORMAL
LYMPHOCYTES # BLD AUTO: ABNORMAL K/UL
LYMPHOCYTES NFR BLD: 7 %
MCH RBC QN AUTO: 23.2 PG
MCHC RBC AUTO-ENTMCNC: 29.2 %
MCV RBC AUTO: 80 FL
MONOCYTES # BLD AUTO: ABNORMAL K/UL
MONOCYTES NFR BLD: 2 %
NEUTROPHILS NFR BLD: 83 %
NEUTS BAND NFR BLD MANUAL: 5 %
NRBC BLD-RTO: ABNORMAL /100 WBC
OVALOCYTES BLD QL SMEAR: ABNORMAL
PLATELET # BLD AUTO: 308 K/UL
PLATELET BLD QL SMEAR: ABNORMAL
PMV BLD AUTO: ABNORMAL FL
POIKILOCYTOSIS BLD QL SMEAR: ABNORMAL
POLYCHROMASIA BLD QL SMEAR: ABNORMAL
POTASSIUM SERPL-SCNC: 3.6 MMOL/L
POTASSIUM SERPL-SCNC: 4 MMOL/L
PROT SERPL-MCNC: 5 G/DL
RBC # BLD AUTO: 4.01 M/UL
RETIRED EF AND QEF - SEE NOTES: 55 (ref 55–65)
SCHISTOCYTES BLD QL SMEAR: ABNORMAL
SODIUM SERPL-SCNC: 129 MMOL/L
SODIUM SERPL-SCNC: 129 MMOL/L
TRICUSPID VALVE REGURGITATION: ABNORMAL
WBC # BLD AUTO: 18.72 K/UL
WBC OTHER NFR BLD MANUAL: 3 %

## 2017-05-30 PROCEDURE — 63600175 PHARM REV CODE 636 W HCPCS: Performed by: STUDENT IN AN ORGANIZED HEALTH CARE EDUCATION/TRAINING PROGRAM

## 2017-05-30 PROCEDURE — 85060 BLOOD SMEAR INTERPRETATION: CPT | Mod: ,,, | Performed by: PATHOLOGY

## 2017-05-30 PROCEDURE — 94761 N-INVAS EAR/PLS OXIMETRY MLT: CPT

## 2017-05-30 PROCEDURE — 63600175 PHARM REV CODE 636 W HCPCS: Performed by: INTERNAL MEDICINE

## 2017-05-30 PROCEDURE — 80053 COMPREHEN METABOLIC PANEL: CPT

## 2017-05-30 PROCEDURE — 85027 COMPLETE CBC AUTOMATED: CPT

## 2017-05-30 PROCEDURE — 25000003 PHARM REV CODE 250: Performed by: HOSPITALIST

## 2017-05-30 PROCEDURE — 11000001 HC ACUTE MED/SURG PRIVATE ROOM

## 2017-05-30 PROCEDURE — 85007 BL SMEAR W/DIFF WBC COUNT: CPT

## 2017-05-30 PROCEDURE — 25000003 PHARM REV CODE 250: Performed by: INTERNAL MEDICINE

## 2017-05-30 PROCEDURE — 36415 COLL VENOUS BLD VENIPUNCTURE: CPT

## 2017-05-30 PROCEDURE — 93306 TTE W/DOPPLER COMPLETE: CPT

## 2017-05-30 PROCEDURE — 80048 BASIC METABOLIC PNL TOTAL CA: CPT

## 2017-05-30 PROCEDURE — 27000221 HC OXYGEN, UP TO 24 HOURS

## 2017-05-30 RX ORDER — SODIUM CHLORIDE, SODIUM LACTATE, POTASSIUM CHLORIDE, CALCIUM CHLORIDE 600; 310; 30; 20 MG/100ML; MG/100ML; MG/100ML; MG/100ML
INJECTION, SOLUTION INTRAVENOUS CONTINUOUS
Status: ACTIVE | OUTPATIENT
Start: 2017-05-30 | End: 2017-05-30

## 2017-05-30 RX ORDER — IPRATROPIUM BROMIDE AND ALBUTEROL SULFATE 2.5; .5 MG/3ML; MG/3ML
3 SOLUTION RESPIRATORY (INHALATION) EVERY 6 HOURS PRN
Status: DISCONTINUED | OUTPATIENT
Start: 2017-05-30 | End: 2017-05-31

## 2017-05-30 RX ORDER — HYDROCODONE BITARTRATE AND ACETAMINOPHEN 5; 325 MG/1; MG/1
1 TABLET ORAL ONCE AS NEEDED
Status: DISCONTINUED | OUTPATIENT
Start: 2017-05-31 | End: 2017-05-31

## 2017-05-30 RX ADMIN — ROSUVASTATIN CALCIUM 10 MG: 10 TABLET ORAL at 10:05

## 2017-05-30 RX ADMIN — PIPERACILLIN SODIUM,TAZOBACTAM SODIUM 4.5 G: 4; .5 INJECTION, POWDER, FOR SOLUTION INTRAVENOUS at 12:05

## 2017-05-30 RX ADMIN — HYDROXYUREA 500 MG: 500 CAPSULE ORAL at 10:05

## 2017-05-30 RX ADMIN — HYDROCODONE BITARTRATE AND ACETAMINOPHEN 0.5 TABLET: 7.5; 325 TABLET ORAL at 11:05

## 2017-05-30 RX ADMIN — SODIUM CHLORIDE, SODIUM LACTATE, POTASSIUM CHLORIDE, AND CALCIUM CHLORIDE: .6; .31; .03; .02 INJECTION, SOLUTION INTRAVENOUS at 02:05

## 2017-05-30 RX ADMIN — CEFTRIAXONE 1 G: 1 INJECTION, SOLUTION INTRAVENOUS at 04:05

## 2017-05-30 RX ADMIN — CARVEDILOL 6.25 MG: 6.25 TABLET, FILM COATED ORAL at 04:05

## 2017-05-30 RX ADMIN — RIVAROXABAN 20 MG: 20 TABLET, FILM COATED ORAL at 10:05

## 2017-05-30 RX ADMIN — AZITHROMYCIN 250 MG: 250 TABLET, FILM COATED ORAL at 10:05

## 2017-05-30 RX ADMIN — PREDNISONE 50 MG: 10 TABLET ORAL at 04:05

## 2017-05-30 RX ADMIN — HYDROCODONE BITARTRATE AND ACETAMINOPHEN 1 TABLET: 7.5; 325 TABLET ORAL at 04:05

## 2017-05-30 RX ADMIN — PIPERACILLIN SODIUM,TAZOBACTAM SODIUM 4.5 G: 4; .5 INJECTION, POWDER, FOR SOLUTION INTRAVENOUS at 10:05

## 2017-05-30 NOTE — PLAN OF CARE
Future Appointments  Date Time Provider Department Center   6/29/2017 11:00 AM Pino Estrada MD [facility]        05/30/17 1127   Discharge Assessment   Assessment Type Discharge Planning Assessment   Confirmed/corrected address and phone number on facesheet? Yes   Assessment information obtained from? Caregiver   Communicated expected length of stay with patient/caregiver no   Prior to hospitilization cognitive status: Alert/Oriented   Prior to hospitalization functional status: Independent   Current cognitive status: Alert/Oriented   Current Functional Status: Independent   Arrived From home or self-care   Lives With alone   Able to Return to Prior Arrangements no   Is patient able to care for self after discharge? No   Patient's perception of discharge disposition home or selfcare;home health   Patient currently being followed by outpatient case management? No   Patient currently receives home health services? No   Does the patient currently use HME? No   Patient currently receives private duty nursing? No   Patient currently receives any other outside agency services? No   Equipment Currently Used at Home bedside commode;walker, rolling  (doesnt use)   Do you have any problems affording any of your prescribed medications? No   Is the patient taking medications as prescribed? yes   Do you have any financial concerns preventing you from receiving the healthcare you need? No   Does the patient have transportation to healthcare appointments? Yes   Transportation Available family or friend will provide   On Dialysis? No   Does the patient receive services at the [facility]madin Clinic? No   Are there any open cases? No   Discharge Plan A Home;Home with family   Discharge Plan B Home;Home with family   Patient/Family In Agreement With Plan yes     Ann Kuo RN, CCM, CMSRN  RN Transition Navigator  [phone]

## 2017-05-30 NOTE — PLAN OF CARE
Pt and daughter rested comfortable all night.  Had no request for pain meds or other needs.  Tele monitoring: NSR, HR low 100s to low 110.     Bed in lowest position, wheels locked,bed alarm set, daughter at bed side, non skid socks worn by pt and daughter.  Call light within reach, personal items at bed side table next to bed.  No falls experienced.

## 2017-05-30 NOTE — TELEPHONE ENCOUNTER
----- Message from Taryn Gmabino sent at 5/30/2017 10:18 AM CDT -----  Contact: Nori chamorro/ [phone]  Patient was admitted for congestive heart failure at [facility] last night. Please advise.

## 2017-05-30 NOTE — PHYSICIAN QUERY
PT Name: Angel Rincon  MR #: [id number]    Physician Query Form - Atrial Fibrillation Specificity     CDS/: Romi Santos               Contact information:[email or web address]     This form is a permanent document in the medical record.     Query Date: May 30, 2017    By submitting this query, we are merely seeking further clarification of documentation. Please utilize your independent clinical judgment when addressing the question(s) below.    The medical record contains the following:   Indicators     Supporting Clinical Findings Location in Medical Record   x Atrial Fibrillation Afib on anticoagulation  H&P    EKG results EKG w/ afib, rate controlled, no acute ischemic changes.  H&P   x Medication On Xarelto. Rate controlled.  H&P, MAR    Treatment      Other         Provider, please further specify the Atrial Fibrillation diagnosis.    [  ] Chronic  [X] Paroxysmal  [  ] Permanent  [  ] Persistent  [  ] Other (please specify): ____________________________  [  ] Clinically Undetermined    Please document in your progress notes daily for the duration of treatment until resolved, and include in your discharge summary.

## 2017-05-30 NOTE — PROGRESS NOTES
.Pharmacy New Medication Education    Patient accepted medication education.    Pharmacy educated patient on the following medications, using the teach-back method.   Azithromycin  Coreg  D50%  Glucagon  Glucose  Norco  Hydroxyurea  Zosyn  Kamilarelcarlene Vera    Learners of pharmacy medication education included:  patient    Patient +/- learner response:  verbalize understanding

## 2017-05-30 NOTE — H&P
[facility] Internal Medicine History and Physical - Resident Note    Admitting Team: U IM B  Attending Physician: Brittany  Resident: Herb  Interns: Atwi and Guccione    Date of Admit: 5/29/2017    Chief Complaint     Fatigue and SOB x 2 weeks    Subjective:      History of Present Illness:  Angel Rincon is a 87 y.o. female with CAD s/p stent and CABG 2013, HTN, osteoporosis, polycythemia, anemia, HLD, and history of nephrolithiasis presented to [facility] on 5/29/2017 with a primary complaint of fatigue and shortness of breath x 2 weeks.    The patient was in their usual state of health until ~2 weeks ago around mother's day when she began to have shortness of breath, cough, fatigue, and poor appetite.  She describes the cough as dry, and associated with a sore throat.    These findings were also associated with leg swelling and increased shortness of breath with ambulation.    This continued on for ~10 days, and then this past Friday she had a fever, measured at home as 101.  Thus she went to the doctor, who prescribed her Azithromycin and went up on her lasix (from 40 to 60).  Her leg swelling improved, however, her cough did not and she also developed a headache.  With continued symptoms she decided to present to the ED.    She denies any recent symptoms of dysuria, urinary frequency.    Past Medical History:  Past Medical History:   Diagnosis Date    Angina of effort     Arthritis     Back pain     Cancer     Hyperlipidemia     Hypertension     Osteoporosis     Polycythemia     Restless leg        Past Surgical History:  Past Surgical History:   Procedure Laterality Date    ANGIOPLASTY      CORONARY ARTERY BYPASS GRAFT      HYSTERECTOMY      TONSILLECTOMY         Allergies:  Review of patient's allergies indicates:   Allergen Reactions    Citalopram      Other reaction(s): agitation    Codeine Nausea Only    Sulfa (sulfonamide antibiotics)      Other reaction(s):  Swelling    Xanax [alprazolam] Other (See Comments)     Unspecified reaction       Home Medications:  Prior to Admission medications    Medication Sig Start Date End Date Taking? Authorizing Provider   azithromycin (Z-RAINE) 250 MG tablet Take 2 tablets by mouth on day 1; Take 1 tablet by mouth on days 2-5 5/26/17   Hussein Kidd MD   carvedilol (COREG) 6.25 MG tablet Take 1 tablet (6.25 mg total) by mouth 2 (two) times daily with meals. 3/2/17 3/2/18  Tj Brooks MD   ergocalciferol (ERGOCALCIFEROL) 50,000 unit Cap Take 1 capsule (50,000 Units total) by mouth once a week. 5/19/17 7/22/17  Pino Estrada MD   furosemide (LASIX) 40 MG tablet Take 1 tablet (40 mg total) by mouth once daily.  Patient taking differently: Take 60 mg by mouth once daily.  3/2/17   Tj Brooks MD   hydrocodone-acetaminophen 7.5-325mg (NORCO) 7.5-325 mg per tablet Take 1 tablet by mouth every 6 (six) hours as needed for Pain.    Historical Provider, MD   hydroxyurea (HYDREA) 500 mg Cap TAKE 1 CAPSULE TWICE DAILY 5/4/16   Terrance Ordaz MD   losartan (COZAAR) 50 MG tablet Take 1 tablet (50 mg total) by mouth once daily. 3/9/17   Romi Rogers MD   nitroGLYCERIN 0.4 MG/DOSE TL SPRY (NITROLINGUAL) 0.4 mg/dose spray  6/25/13   Historical Provider, MD   potassium chloride (KLOR-CON) 20 mEq Pack Take 20 mEq by mouth once daily. 3/9/17   Romi Rogers MD   rivaroxaban (XARELTO) 20 mg Tab Take by mouth once daily.     Historical Provider, MD   rosuvastatin (CRESTOR) 10 MG tablet Take 1 tablet (10 mg total) by mouth once daily. 3/23/17   Romi Rogers MD   triamcinolone acetonide 0.1% (KENALOG) 0.1 % cream AAA bid to back prn itching. Not more than 2 weeks straight in the location 4/6/17   Caitlyn Loyola MD       Family History:  Family History   Problem Relation Age of Onset    Adopted: Yes    Cancer Daughter      soft-tissue sarcoma    Melanoma Neg Hx        Social  "History:  Social History   Substance Use Topics    Smoking status: Never Smoker    Smokeless tobacco: Current User     Types: Snuff    Alcohol use 1.2 oz/week     2 Cans of beer per week       Review of Systems:  Pertinent items are noted in HPI. All other systems are reviewed and are negative.    Health Maintaince :   Primary Care Physician: Sean  Immunizations:   TDap is up to date.  Influenza is up to date.  Pneumovax is up to date.  Cancer Screening:  PAP: is up to date.  Mammogram: is up to date.  Colonoscopy: is up to date.     Objective:   Last 24 Hour Vital Signs:  BP  Min: 110/68  Max: 115/53  Temp  Av.7 °F (37.6 °C)  Min: 99.7 °F (37.6 °C)  Max: 99.7 °F (37.6 °C)  Pulse  Av  Min: 91  Max: 106  Resp  Av  Min: 18  Max: 18  SpO2  Av.3 %  Min: 94 %  Max: 99 %  Height  Av' 7" (139.7 cm)  Min: 4' 7" (139.7 cm)  Max: 4' 7" (139.7 cm)  Weight  Av.5 kg (118 lb)  Min: 53.5 kg (118 lb)  Max: 53.5 kg (118 lb)  Body mass index is 27.43 kg/m².  No intake/output data recorded.    Physical Examination:  Vitals:    17 1749   BP: 110/68   Pulse: 91   Resp:    Temp:      Gen: NAD, laying in bed comfortably  Head: NCAT  Eyes: EOMI, PERRLA, no scleral icterus  Mouth: MMM, tongue protrusion midline, no tonsillar enlargement or exudates  Neck: JVP 8 cm, no lymphadenopathy  CV: RRR, mild diastolic murmur best heard in lower R sternal border  Pulm: bibasilar crackles  Abd: normoactive, mild diffuse ttp, nondistended  Extremities: mild bilateral lower extremity pitting edema, 2+ pulses in all extremities  Skin: varicose veins to bilateral lower extremities  Neuro: moving all extremities well, gross sensation to light touch preserved, nonfocal  Psych: AAOx4, calm, cooperative, conversational        Laboratory:  Most Recent Data:  CBC: Lab Results   Component Value Date    WBC 20.70 (H) 2017    HGB 10.1 (L) 2017    HCT 35.0 (L) 2017     (H) 2017    MCV 81 (L) " 05/29/2017    RDW 19.8 (H) 05/29/2017     Lab Results   Component Value Date    GRAN 95.0 (H) 05/29/2017    LYMPH CANCELED 05/29/2017    LYMPH 5.0 (L) 05/29/2017    MONO CANCELED 05/29/2017    MONO 0.0 (L) 05/29/2017    EOS CANCELED 05/29/2017    BASO CANCELED 05/29/2017    EOSINOPHIL 0.0 05/29/2017    BASOPHIL 0.0 05/29/2017       BMP: Lab Results   Component Value Date     (L) 05/29/2017    K 4.0 05/29/2017     05/29/2017    CO2 15 (L) 05/29/2017    BUN 20 05/29/2017    CREATININE 1.6 (H) 05/29/2017     (H) 05/29/2017    CALCIUM 8.4 (L) 05/29/2017    MG 2.4 03/02/2017    PHOS 3.8 03/02/2017     LFTs: Lab Results   Component Value Date    PROT 5.6 (L) 05/29/2017    ALBUMIN 2.9 (L) 05/29/2017    BILITOT 2.3 (H) 05/29/2017    AST 41 (H) 05/29/2017    ALKPHOS 95 05/29/2017    ALT 22 05/29/2017     Coags:   Lab Results   Component Value Date    INR 1.1 08/18/2016     FLP: Lab Results   Component Value Date    CHOL 82 (L) 03/02/2017    HDL 40 03/02/2017    LDLCALC 27.8 (L) 03/02/2017    TRIG 71 03/02/2017    CHOLHDL 48.8 03/02/2017     DM: Lab Results   Component Value Date    HGBA1C 5.6 03/02/2017    HGBA1C 5.8 06/02/2016    LDLCALC 27.8 (L) 03/02/2017    CREATININE 1.6 (H) 05/29/2017     Thyroid: Lab Results   Component Value Date    TSH 1.766 05/29/2017     Anemia: Lab Results   Component Value Date    IRON 28 (L) 03/02/2017    TIBC 460 (H) 03/02/2017    FERRITIN 93 03/02/2017    IQTZHAZX10 855 03/02/2017    FOLATE 8.9 03/02/2017     Cardiac: Lab Results   Component Value Date    TROPONINI 0.022 05/29/2017     (H) 05/29/2017     Urinalysis: Lab Results   Component Value Date    LABURIN  06/14/2016     Multiple organisms isolated. None in predominance.  Repeat if    LABURIN clinically necessary. 06/14/2016    COLORU [place] (A) 05/29/2017    SPECGRAV 1.025 05/29/2017    NITRITE Negative 05/29/2017    PROTEINUR 30 01/13/2015    KETONESU Trace (A) 05/29/2017    UROBILINOGEN 2.0-3.0 (A)  05/29/2017    BILIRUBINUR negative 01/13/2015    WBCUA 3 05/29/2017       Trended Lab Data:    Recent Labs  Lab 05/29/17  1643   WBC 20.70*   HGB 10.1*   HCT 35.0*   *   MCV 81*   RDW 19.8*   *   K 4.0      CO2 15*   BUN 20   CREATININE 1.6*   *   PROT 5.6*   ALBUMIN 2.9*   BILITOT 2.3*   AST 41*   ALKPHOS 95   ALT 22       Trended Cardiac Data:    Recent Labs  Lab 05/29/17  1643   TROPONINI 0.022   *       Microbiology Data:  Blood Cx pending  Urine cx pending    Other Laboratory Data:    Other Results:  EKG (my interpretation): NSR, nonspecific t-wave inversions.    Radiology:  Imaging Results          X-Ray Chest PA And Lateral (Final result)  Result time 05/29/17 18:04:29    Final result by Ron Leija MD (05/29/17 18:04:29)                 Impression:       No acute intrathoracic process.          Electronically signed by: RON LEIJA MD  Date:     05/29/17  Time:    18:04              Narrative:    [id number]  Accession # [id number]      Study:  XR CHEST PA AND LATERAL    Indication: COUGH.    Comparison: Chest radiograph from 03/01/2017.    Findings:     XR CHEST PA AND LATERAL.    No significant change when compared to prior exam.    Postsurgical changes status post sternotomy.  No cardiomegaly.  Calcification and tortuosity of the thoracic aorta.  Normal pulmonary vasculature.  Lungs are clear.  Trace bilateral pleural effusions.  Degenerative changes of the thoracic spine.                               Assessment:     Angel Rincon is a 87 y.o. female with:  Patient Active Problem List    Diagnosis Date Noted    SAI (acute kidney injury) 05/29/2017    Narcotic dependence 04/21/2017    Nephrolithiasis 03/09/2017    Acute cystitis without hematuria 03/09/2017    Non-rheumatic tricuspid valve insufficiency 03/09/2017    Chronic diastolic heart failure 03/02/2017    Leukocytosis 03/02/2017    Thrombocytosis 03/02/2017    Normocytic anemia 03/02/2017    Chewing  tobacco nicotine dependence 03/02/2017    Bilateral lower leg cellulitis 03/02/2017    Shaking spells     Coagulation defect 12/12/2016    Rheumatoid factor positive with cyclic citrullinated peptide (CCP) antibody negative 08/18/2016    Lumbar radiculopathy 07/18/2016    Peripheral polyneuropathy 07/18/2016    Chronic insomnia 07/07/2016    Chronic alcoholism in remission 06/14/2016    Chronic diarrhea 06/14/2016    Peripheral arterial disease 06/04/2016    Atrial fibrillation, controlled 06/04/2016    Polycythemia vera 06/04/2016    Restless leg syndrome 05/20/2015    Acute pain of left hip 02/03/2015    Back pain 01/13/2015    UTI (lower urinary tract infection) 01/13/2015    HLD (hyperlipidemia) 08/19/2014    S/P CABG (coronary artery bypass graft) 08/14/2013    HTN (hypertension) 08/14/2013        Plan:     SAI  Cr 1.6 w/ baseline 0.9. UA w/ negative LE and nitrite, 3 WBC, +hyaline casts.   - Check FENa & FEUr  - Suspect prerenal, will hydrate gently given h/o HFpEF  - F/u UCx     Leukocytosis  CXR w/ no focal consolidation. UA w/ no e/o UTI. Diffuse abd pain and epigastric TTP, bili elevated.  - RUQ ultrasound r/o fernando  - pip/dustin and azithromycin for possible abd, urinary, pulm source     Hyponatremia  Na 128. Hypovolemic by history and exam. S/p 500ml NS bolus in ED.   - LR infusion at 100ml/hr  - Follow Q6H BMPs     RAZO w/ HFpEF  SOB on exertion worse than baseline x 2-3 weeks. CXR w/ no pulm edema, improved from prior study. BNP 400s, baseline. EKG w/ afib, rate controlled, no acute ischemic changes. Last echo w/ nml EF, no comment on DD but PASP WNL 39.  - Monitor for s/s volume overload  - holding lasix for above SAI     Hypoalbuminemia  2.9.   - Follow CMP, encourage nutrition     Afib on anticoagulation  On Xarelto. Rate controlled.  - Con't Xarelto, BB     HTN  Controlled. Con't home antihypertensives.     Polycythemia Vera  Con't home hydroxyurea     Normocytic anemia  Iron studies  suggest WILTON.  - Iron supplementation on discharge     CAD s/p PCI x 3 and CABG 3v  No CP. Tn 0.022. EKG w/ no acute ischemic changes.     HLD  Con't home statin.    Diet: Cardiac  Prophylaxis: heparin  Code: Full    Collins Frost MD HO-1  U Internal Medicine Team B    hospitals Medicine Hospitalist Pager numbers:   hospitals Hospitalist Medicine Team A (Elena/Riri): [phone]  hospitals Hospitalist Medicine Team B (Angelo/Bradley):  [phone]

## 2017-05-30 NOTE — PROGRESS NOTES
"[facility] IM Resident [facility] Progress Note    Subjective:      Angel Rincon is a 87 y.o. female who is being followed by the [facility] IM service at [facility] for SAI.    This morning, patient continues to complain of shortness of breath exacerbated by any activity.  Denies chest pain, reports leg pain.     Objective:   Last 24 Hour Vital Signs:  BP  Min: 106/59  Max: 115/55  Temp  Av.2 °F (37.3 °C)  Min: 98.8 °F (37.1 °C)  Max: 99.7 °F (37.6 °C)  Pulse  Av  Min: 91  Max: 106  Resp  Av.3  Min: 16  Max: 18  SpO2  Av.8 %  Min: 94 %  Max: 99 %  Height  Av' 7" (139.7 cm)  Min: 4' 7" (139.7 cm)  Max: 4' 7" (139.7 cm)  Weight  Av.2 kg (119 lb 7.2 oz)  Min: 53.5 kg (118 lb)  Max: 54.8 kg (120 lb 14.4 oz)  I/O last 3 completed shifts:  In: 900 [I.V.:800; IV Piggyback:100]  Out: 0     Physical Examination:  Vitals:    17 0410   BP: (!) 106/59   Pulse: 101   Resp: 16   Temp: 98.8 °F (37.1 °C)     Gen: NAD, laying in bed comfortably  HENT: NCAT, EOMI, MMM  CV: RRR, mild diastolic murmur best heard in lower R sternal border  Pulm: bibasilar crackles  Abd: normoactive, mild diffuse ttp, nondistended  Extremities: mild bilateral lower extremity pitting edema, 2+ pulses in all extremities  Skin: varicose veins to bilateral lower extremities  Neuro: moving all extremities well, gross sensation to light touch preserved, nonfocal  Psych: AAOx4, calm, cooperative, conversational      Laboratory:  Laboratory Data Reviewed: yes  Pertinent Findings:  WBC 20.7 -> 18.7  Na 128 -> 129  HCO3 15 -> 17  Cr 1.6 -> 1.5    Microbiology Data Reviewed: yes  Pertinent Findings:   Blood Cx NGTD   Urine Cx pending    Other Results:  EKG (my interpretation): sinus tach, twave inversions III, V3    Radiology Data Reviewed: yes  Pertinent Findings:  none    Current Medications:     Infusions:        Scheduled:   azithromycin  250 mg Oral Daily    carvedilol  6.25 mg Oral BID WM    hydroxyurea  500 mg " Oral Daily    piperacillin-tazobactam 4.5 g in dextrose 5 % 100 mL IVPB (ready to mix system)  4.5 g Intravenous Q12H    rivaroxaban  20 mg Oral Daily    rosuvastatin  10 mg Oral Daily        PRN:  dextrose 50%, dextrose 50%, glucagon (human recombinant), glucose, glucose, hydrocodone-acetaminophen 7.5-325mg    Antibiotics and Day Number of Therapy:  Zosyn, Azithro 5/30-current    Lines and Day Number of Therapy:  PIV    Assessment:     Angel Rincon is a 87 y.o.female with  Patient Active Problem List    Diagnosis Date Noted    SAI (acute kidney injury) 05/29/2017    Narcotic dependence 04/21/2017    Nephrolithiasis 03/09/2017    Acute cystitis without hematuria 03/09/2017    Non-rheumatic tricuspid valve insufficiency 03/09/2017    Chronic diastolic heart failure 03/02/2017    Leukocytosis 03/02/2017    Thrombocytosis 03/02/2017    Normocytic anemia 03/02/2017    Chewing tobacco nicotine dependence 03/02/2017    Bilateral lower leg cellulitis 03/02/2017    Shaking spells     Coagulation defect 12/12/2016    Rheumatoid factor positive with cyclic citrullinated peptide (CCP) antibody negative 08/18/2016    Lumbar radiculopathy 07/18/2016    Peripheral polyneuropathy 07/18/2016    Chronic insomnia 07/07/2016    Chronic alcoholism in remission 06/14/2016    Chronic diarrhea 06/14/2016    Peripheral arterial disease 06/04/2016    Atrial fibrillation, controlled 06/04/2016    Polycythemia vera 06/04/2016    Restless leg syndrome 05/20/2015    Acute pain of left hip 02/03/2015    Back pain 01/13/2015    UTI (lower urinary tract infection) 01/13/2015    HLD (hyperlipidemia) 08/19/2014    S/P CABG (coronary artery bypass graft) 08/14/2013    HTN (hypertension) 08/14/2013        Plan:     SAI  Cr 1.6 w/ baseline 0.9. UA w/ negative LE and nitrite, 3 WBC, +hyaline casts  - Suspect prerenal, will hydrate gently given h/o HFpEF  - Procal 1.11  - FeUrea 7.6% consistent with prerenal  - F/u  UCx     Leukocytosis  CXR w/ no focal consolidation. UA w/ no e/o UTI. Diffuse abd pain and epigastric TTP, bili elevated.  - RUQ ultrasound r/o fernando pending  - pip/dustin and azithromycin for possible abd, urinary, pulm source    Hyponatremia  Na 128. Hypovolemic by history and exam. S/p 500ml NS bolus in ED.   - LR infusion at 100ml/hr  - Follow Q6H BMPs     RAZO w/ HFpEF  SOB on exertion worse than baseline x 2-3 weeks. CXR w/ no pulm edema, improved from prior study. BNP 400s, baseline. EKG w/ afib, rate controlled, no acute ischemic changes. Last echo w/ nml EF, no comment on DD but PASP WNL 39.  - Monitor for s/s volume overload  - holding lasix for above SAI     Hypoalbuminemia  2.9.   - Follow CMP, encourage nutrition     Afib on anticoagulation  On Xarelto. Rate controlled.  - Con't Xarelto, BB     HTN  Controlled. Con't home antihypertensives.     Polycythemia Vera  Con't home hydroxyurea     Normocytic anemia  Iron studies suggest WILTON.  - Iron supplementation on discharge     CAD s/p PCI x 3 and CABG 3v  No CP. Tn 0.022. EKG w/ no acute ischemic changes.     HLD  Con't home statin.     Diet: Cardiac  Prophylaxis: heparin  Code: Full    Dispo: pending clinical improvement and resolution of SAI     Collins Frost MD HO-1  U Internal Medicine Team B    \[facility] [place] [facility]\"" Medicine Hospitalist Pager numbers:   [facility] Hospitalist Medicine Team A (Elena/Riri): [phone]  \[facility] [place] [facility]\"" Hospitalist Medicine Team B (Angelo/Bradley):  [phone]

## 2017-05-30 NOTE — PLAN OF CARE
Problem: Patient Care Overview  Goal: Plan of Care Review  Sats 99% RA; tolerating well; will continue to monitor.

## 2017-05-30 NOTE — NURSING
Md made aware pt has converted to a fib on tele. Pt has a history of A fib and is on a beta blocker and a blood thinner.

## 2017-05-30 NOTE — PLAN OF CARE
Problem: Patient Care Overview  Goal: Plan of Care Review  Outcome: Ongoing (interventions implemented as appropriate)  Pt safety maintained. Bed in low and locked position. No distress noted. Pt receiving IVF and po pain medication. Pt had abd us and a 2d echo done today. Will continue to monitor.

## 2017-05-30 NOTE — PLAN OF CARE
Problem: Patient Care Overview  Goal: Plan of Care Review  Room air SpO2   95%. Pt with no apparent distress noted. Will continue to monitor.

## 2017-05-31 LAB
ALBUMIN SERPL BCP-MCNC: 2.6 G/DL
ALP SERPL-CCNC: 87 U/L
ALT SERPL W/O P-5'-P-CCNC: 17 U/L
ANION GAP SERPL CALC-SCNC: 10 MMOL/L
ANISOCYTOSIS BLD QL SMEAR: ABNORMAL
AST SERPL-CCNC: 26 U/L
BACTERIA UR CULT: NORMAL
BASOPHILS # BLD AUTO: ABNORMAL K/UL
BASOPHILS NFR BLD: 0 %
BILIRUB SERPL-MCNC: 1.3 MG/DL
BUN SERPL-MCNC: 28 MG/DL
BURR CELLS BLD QL SMEAR: ABNORMAL
CALCIUM SERPL-MCNC: 8.2 MG/DL
CHLORIDE SERPL-SCNC: 102 MMOL/L
CO2 SERPL-SCNC: 16 MMOL/L
CREAT SERPL-MCNC: 1.8 MG/DL
DACRYOCYTES BLD QL SMEAR: ABNORMAL
DIFFERENTIAL METHOD: ABNORMAL
EOSINOPHIL # BLD AUTO: ABNORMAL K/UL
EOSINOPHIL NFR BLD: 0 %
ERYTHROCYTE [DISTWIDTH] IN BLOOD BY AUTOMATED COUNT: 19.8 %
EST. GFR  (AFRICAN AMERICAN): 29 ML/MIN/1.73 M^2
EST. GFR  (NON AFRICAN AMERICAN): 25 ML/MIN/1.73 M^2
GLUCOSE SERPL-MCNC: 156 MG/DL
HCT VFR BLD AUTO: 35 %
HGB BLD-MCNC: 10.4 G/DL
HYPOCHROMIA BLD QL SMEAR: ABNORMAL
LYMPHOCYTES # BLD AUTO: ABNORMAL K/UL
LYMPHOCYTES NFR BLD: 7 %
MCH RBC QN AUTO: 23.5 PG
MCHC RBC AUTO-ENTMCNC: 29.7 %
MCV RBC AUTO: 79 FL
MONOCYTES # BLD AUTO: ABNORMAL K/UL
MONOCYTES NFR BLD: 3 %
NEUTROPHILS NFR BLD: 78 %
NEUTS BAND NFR BLD MANUAL: 2 %
OVALOCYTES BLD QL SMEAR: ABNORMAL
PATH REV BLD -IMP: NORMAL
PLATELET # BLD AUTO: 236 K/UL
PLATELET BLD QL SMEAR: ABNORMAL
PMV BLD AUTO: ABNORMAL FL
POIKILOCYTOSIS BLD QL SMEAR: ABNORMAL
POLYCHROMASIA BLD QL SMEAR: ABNORMAL
POTASSIUM SERPL-SCNC: 3.6 MMOL/L
PROT SERPL-MCNC: 5.2 G/DL
RBC # BLD AUTO: 4.42 M/UL
SODIUM SERPL-SCNC: 128 MMOL/L
STOMATOCYTES BLD QL SMEAR: ABNORMAL
WBC # BLD AUTO: 24.1 K/UL
WBC OTHER NFR BLD MANUAL: 10 %

## 2017-05-31 PROCEDURE — G8996 SWALLOW CURRENT STATUS: HCPCS | Mod: CJ

## 2017-05-31 PROCEDURE — 11000001 HC ACUTE MED/SURG PRIVATE ROOM

## 2017-05-31 PROCEDURE — 80053 COMPREHEN METABOLIC PANEL: CPT

## 2017-05-31 PROCEDURE — 25000003 PHARM REV CODE 250: Performed by: HOSPITALIST

## 2017-05-31 PROCEDURE — 63600175 PHARM REV CODE 636 W HCPCS: Performed by: STUDENT IN AN ORGANIZED HEALTH CARE EDUCATION/TRAINING PROGRAM

## 2017-05-31 PROCEDURE — 97161 PT EVAL LOW COMPLEX 20 MIN: CPT

## 2017-05-31 PROCEDURE — 87449 NOS EACH ORGANISM AG IA: CPT

## 2017-05-31 PROCEDURE — 25000242 PHARM REV CODE 250 ALT 637 W/ HCPCS: Performed by: STUDENT IN AN ORGANIZED HEALTH CARE EDUCATION/TRAINING PROGRAM

## 2017-05-31 PROCEDURE — 36415 COLL VENOUS BLD VENIPUNCTURE: CPT

## 2017-05-31 PROCEDURE — G8997 SWALLOW GOAL STATUS: HCPCS | Mod: CI

## 2017-05-31 PROCEDURE — 94761 N-INVAS EAR/PLS OXIMETRY MLT: CPT

## 2017-05-31 PROCEDURE — 85007 BL SMEAR W/DIFF WBC COUNT: CPT

## 2017-05-31 PROCEDURE — 94640 AIRWAY INHALATION TREATMENT: CPT

## 2017-05-31 PROCEDURE — 97165 OT EVAL LOW COMPLEX 30 MIN: CPT

## 2017-05-31 PROCEDURE — 92610 EVALUATE SWALLOWING FUNCTION: CPT

## 2017-05-31 PROCEDURE — 25000003 PHARM REV CODE 250: Performed by: STUDENT IN AN ORGANIZED HEALTH CARE EDUCATION/TRAINING PROGRAM

## 2017-05-31 PROCEDURE — 85060 BLOOD SMEAR INTERPRETATION: CPT | Mod: ,,, | Performed by: PATHOLOGY

## 2017-05-31 PROCEDURE — 85027 COMPLETE CBC AUTOMATED: CPT

## 2017-05-31 RX ORDER — HYDROCODONE BITARTRATE AND ACETAMINOPHEN 5; 325 MG/1; MG/1
1 TABLET ORAL ONCE
Status: COMPLETED | OUTPATIENT
Start: 2017-05-31 | End: 2017-05-31

## 2017-05-31 RX ORDER — IPRATROPIUM BROMIDE AND ALBUTEROL SULFATE 2.5; .5 MG/3ML; MG/3ML
3 SOLUTION RESPIRATORY (INHALATION)
Status: DISCONTINUED | OUTPATIENT
Start: 2017-05-31 | End: 2017-06-03 | Stop reason: HOSPADM

## 2017-05-31 RX ORDER — FAMOTIDINE 20 MG/1
20 TABLET, FILM COATED ORAL DAILY
Status: DISCONTINUED | OUTPATIENT
Start: 2017-05-31 | End: 2017-06-03 | Stop reason: HOSPADM

## 2017-05-31 RX ORDER — MOXIFLOXACIN HYDROCHLORIDE 400 MG/250ML
400 INJECTION, SOLUTION INTRAVENOUS
Status: DISCONTINUED | OUTPATIENT
Start: 2017-05-31 | End: 2017-06-03

## 2017-05-31 RX ADMIN — CARVEDILOL 6.25 MG: 6.25 TABLET, FILM COATED ORAL at 08:05

## 2017-05-31 RX ADMIN — AZITHROMYCIN 250 MG: 250 TABLET, FILM COATED ORAL at 08:05

## 2017-05-31 RX ADMIN — ROSUVASTATIN CALCIUM 10 MG: 10 TABLET ORAL at 08:05

## 2017-05-31 RX ADMIN — CARVEDILOL 6.25 MG: 6.25 TABLET, FILM COATED ORAL at 06:05

## 2017-05-31 RX ADMIN — HYDROCODONE BITARTRATE AND ACETAMINOPHEN 1 TABLET: 5; 325 TABLET ORAL at 10:05

## 2017-05-31 RX ADMIN — IPRATROPIUM BROMIDE AND ALBUTEROL SULFATE 3 ML: .5; 3 SOLUTION RESPIRATORY (INHALATION) at 08:05

## 2017-05-31 RX ADMIN — SODIUM BICARBONATE: 84 INJECTION, SOLUTION INTRAVENOUS at 02:05

## 2017-05-31 RX ADMIN — RIVAROXABAN 20 MG: 20 TABLET, FILM COATED ORAL at 08:05

## 2017-05-31 RX ADMIN — CEFTRIAXONE 1 G: 1 INJECTION, SOLUTION INTRAVENOUS at 04:05

## 2017-05-31 RX ADMIN — MOXIFLOXACIN HYDROCHLORIDE 400 MG: 400 INJECTION, SOLUTION INTRAVENOUS at 03:05

## 2017-05-31 RX ADMIN — HYDROXYUREA 500 MG: 500 CAPSULE ORAL at 08:05

## 2017-05-31 RX ADMIN — IPRATROPIUM BROMIDE AND ALBUTEROL SULFATE 3 ML: .5; 3 SOLUTION RESPIRATORY (INHALATION) at 12:05

## 2017-05-31 RX ADMIN — PREDNISONE 50 MG: 10 TABLET ORAL at 08:05

## 2017-05-31 RX ADMIN — FAMOTIDINE 20 MG: 20 TABLET, FILM COATED ORAL at 10:05

## 2017-05-31 NOTE — PLAN OF CARE
Problem: Patient Care Overview  Goal: Plan of Care Review  Outcome: Ongoing (interventions implemented as appropriate)  Pt on RA with sats of 95. Will continue to monitor.

## 2017-05-31 NOTE — PT/OT/SLP EVAL
"Occupational Therapy  Evaluation    Angel Rincon   MRN: [id number]   Admitting Diagnosis: <principal problem not specified>    OT Date of Treatment: 05/31/17   OT Start Time: 1024  OT Stop Time: 1038  OT Total Time (min): 14 min    Billable Minutes:  Evaluation 14    Diagnosis: <principal problem not specified>     Past Medical History:   Diagnosis Date    Angina of effort     Arthritis     Back pain     Cancer     Hyperlipidemia     Hypertension     Osteoporosis     Polycythemia     Restless leg       Past Surgical History:   Procedure Laterality Date    ANGIOPLASTY      CORONARY ARTERY BYPASS GRAFT      HYSTERECTOMY      TONSILLECTOMY           General Precautions: Standard, fall, aspiration  Orthopedic Precautions:    Braces:            Patient History:  Living Environment  Living Environment Comment: Pt lives alone in mobile home 9 VERNA w/HR. Pt has BSC, RW. Indep PTA    Prior level of function:            Dominant hand: right    Subjective:  Communicated with nurse prior to session.  "I am so tired"  Chief Complaint: fatigue  Patient/Family stated goals: return to PLOF         Objective:       Cognitive Exam:  Oriented to: AO4  Follows Commands/attention: Follows two-step commands  Communication: clear/fluent  Memory:  No Deficits noted  Safety awareness/insight to disability: intact  Coping skills/emotional control: Despondent    Visual/perceptual:  Intact    Physical Exam:  Postural examination/scapula alignment: Rounded shoulder and Head forward  Skin integrity: bruising  Edema: Mild BLE    Sensation:   Intact    Upper Extremity Range of Motion:  Right Upper Extremity: WFL  Left Upper Extremity: WFL    Upper Extremity Strength:  Right Upper Extremity: grossly 3+/5  Left Upper Extremity: grossly 3+/5   Strength: WFL    Fine motor coordination:   Intact    Gross motor coordination: WFL    Functional Mobility:  Bed Mobility:  Rolling/Turning to Left: Supervision  Scooting/Bridging: " "Supervision  Supine to Sit: Supervision  Sit to Supine: Supervision    Transfers:  Sit <> Stand Assistance: Supervision  Sit <> Stand Assistive Device: No Assistive Device  Toilet Transfer Technique: Stand Pivot  Toilet Transfer Assistance: Supervision    Functional Ambulation: SBA-CGA without AD--defer to PT    Activities of Daily Living:  Feeding Level of Assistance: Modified independent  Feeding adaptive equipment:      UE adaptive equipment:   LE Dressing Level of Assistance: Supervision  LE adaptive equipment:   Grooming Position: Standing at sink  Grooming Level of Assistance: Supervision  Toileting Where Assessed: Toilet  Toileting Level of Assistance: Supervision        Bathing adaptive equipment:     Balance:   Static Sit: GOOD: Takes MODERATE challenges from all directions  Dynamic Sit: GOOD: Maintains balance through MODERATE excursions of active trunk movement  Static Stand: FAIR: Maintains without assist but unable to take challenges  Dynamic stand: FAIR: Needs CONTACT GUARD during gait    Therapeutic Activities and Exercises:      AM-PAC 6 CLICK ADL  How much help from another person does this patient currently need?  1 = Unable, Total/Dependent Assistance  2 = A lot, Maximum/Moderate Assistance  3 = A little, Minimum/Contact Guard/Supervision  4 = None, Modified [place]/Independent    Putting on and taking off regular lower body clothing? : 3  Bathing (including washing, rinsing, drying)?: 3  Putting on and taking off regular upper body clothing?: 3  Taking care of personal grooming such as brushing teeth?: 3  Eating meals?: 3    AM-PAC Raw Score CMS "G-Code Modifier Level of Impairment Assistance   6 % Total / Unable   7 - 9 CM 80 - 100% Maximal Assist   10-14 CL 60 - 80% Moderate Assist   15 - 19 CK 40 - 60% Moderate Assist   20 - 22 CJ 20 - 40% Minimal Assist   23 CI 1-20% SBA / CGA   24 CH 0% Independent/ Mod I       Patient left HOB elevated with all lines intact, call button in " reach, bed alarm on, nurse notified and family present    Assessment:  Angel Rincon is a 87 y.o. female with a medical diagnosis of <principal problem not specified> and presents with decreased strength, balance, endurance, balance limiting indep all needs. Pt will benefit from skilled OT services.    Rehab identified problem list/impairments: Rehab identified problem list/impairments: weakness, impaired functional mobilty, impaired endurance, gait instability, impaired balance, impaired self care skills, decreased upper extremity function, impaired cardiopulmonary response to activity    Rehab potential is good.    Activity tolerance: Fair    Discharge recommendations: Discharge Facility/Level Of Care Needs: home with home health     Barriers to discharge: Barriers to Discharge: Inaccessible home environment    Equipment recommendations:       GOALS:    Occupational Therapy Goals        Problem: Occupational Therapy Goal    Goal Priority Disciplines Outcome Interventions   Occupational Therapy Goal     OT, PT/OT Ongoing (interventions implemented as appropriate)    Description:  Goals to be met by: 6/30     Patient will increase functional independence with ADLs by performing:    UE Dressing with Modified [place].  LE Dressing with Modified [place].  Grooming while standing with Modified [place].  Toileting from toilet with Modified [place] for hygiene and clothing management.   Toilet transfer to toilet with Modified [place].  Upper extremity exercise program x10 reps per handout, with independence.                      PLAN:  Patient to be seen 5 x/week to address the above listed problems via therapeutic activities, self-care/home management, therapeutic exercises  Plan of Care expires: 06/30/17  Plan of Care reviewed with: patient, family         Trevin Deal OT  05/31/2017

## 2017-05-31 NOTE — PLAN OF CARE
Problem: Occupational Therapy Goal  Goal: Occupational Therapy Goal  Goals to be met by: 6/30     Patient will increase functional independence with ADLs by performing:    UE Dressing with Modified [place].  LE Dressing with Modified [place].  Grooming while standing with Modified [place].  Toileting from toilet with Modified [place] for hygiene and clothing management.   Toilet transfer to toilet with Modified [place].  Upper extremity exercise program x10 reps per handout, with independence.    Outcome: Ongoing (interventions implemented as appropriate)  OT eval performed, report to follow    Pt may benefit from  OT  (pt has RW and shower chair)

## 2017-05-31 NOTE — PT/OT/SLP EVAL
Physical Therapy  Evaluation    Angel Rincon   MRN: [id number]   Admitting Diagnosis: <principal problem not specified>    PT Received On: 05/31/17  PT Start Time: 1015     PT Stop Time: 1030    PT Total Time (min): 15 min       Billable Minutes:  Evaluation 15    Diagnosis: <principal problem not specified>  The primary encounter diagnosis was SAI (acute kidney injury). Diagnoses of Metabolic acidosis, Dehydration, Acute cystitis without hematuria, Hyponatremia, and Acute on chronic diastolic heart failure were also pertinent to this visit.      Past Medical History:   Diagnosis Date    Angina of effort     Arthritis     Back pain     Cancer     Hyperlipidemia     Hypertension     Osteoporosis     Polycythemia     Restless leg       Past Surgical History:   Procedure Laterality Date    ANGIOPLASTY      CORONARY ARTERY BYPASS GRAFT      HYSTERECTOMY      TONSILLECTOMY         Referring physician: Angelo  Date referred to PT: 5/31/2017    General Precautions: Standard, fall  Orthopedic Precautions: N/A   Braces: N/A            Patient History:  Lives With: alone  Living Arrangements: mobile home  Home Accessibility: stairs to enter home  Number of Stairs to Enter Home: 9  Stair Railings at Home: outside, present on left side  Transportation Available: family or friend will provide  Equipment Currently Used at Home: walker, rolling, bedside commode  DME owned (not currently used): rolling walker and bedside commode    Previous Level of Function:  Ambulation Skills: independent  Transfer Skills: independent  ADL Skills: independent    Subjective:  Communicated with primary nurse prior to session.    Chief Complaint: weakness  Patient goals: go home    Pain/Comfort  Pain Rating 1: 0/10  Pain Rating Post-Intervention 1: 0/10      Objective:   Patient found with: telemetry     Cognitive Exam:  Oriented to: Person, Place, Time and Situation    Follows Commands/attention: Follows one-step commands and Follows  two-step commands  Communication: clear/fluent/[place]  Safety awareness/insight to disability: intact    Physical Exam:  Postural examination/scapula alignment: Rounded shoulder    Skin integrity: Visible skin intact  Edema: Mild BLE    Sensation:   Intact    Upper Extremity Range of Motion:See O T note  Right Upper Extremity:   Left Upper Extremity:     Upper Extremity Strength: See OT note  Right Upper Extremity:   Left Upper Extremity:     Lower Extremity Range of Motion:  Right Lower Extremity: WFL  Left Lower Extremity: WFL    Lower Extremity Strength:  Right Lower Extremity: WFL  Left Lower Extremity: WFL     Fine motor coordination:  Intact    Gross motor coordination: WFL    Functional Mobility:  Bed Mobility:  Supine to Sit: Stand by Assistance  Sit to Supine: Stand by Assistance    Transfers:  Sit <> Stand Assistance: Stand By Assistance  Sit <> Stand Assistive Device: Rolling Walker    Gait:   Gait Distance: 45 ft  Assistance 1: Contact Guard Assistance, Stand by Assistance  Gait Assistive Device: Rolling walker  Gait Pattern: reciprocal  Gait Deviation(s): decreased diaz    Stairs:  na    Balance:   Static Sit: GOOD-: Takes MODERATE challenges from all directions but inconsistently  Dynamic Sit: FAIR+: Maintains balance through MINIMAL excursions of active trunk motion  Static Stand: FAIR+: Takes MINIMAL challenges from all directions  Dynamic stand: FAIR: Needs CONTACT GUARD during gait    Therapeutic Activities and Exercises:  na    AM-PAC 6 CLICK MOBILITY  How much help from another person does this patient currently need?   1 = Unable, Total/Dependent Assistance  2 = A lot, Maximum/Moderate Assistance  3 = A little, Minimum/Contact Guard/Supervision  4 = None, Modified [place]/Independent    Turning over in bed (including adjusting bedclothes, sheets and blankets)?: 4  Sitting down on and standing up from a chair with arms (e.g., wheelchair, bedside commode, etc.): 4  Moving from lying on back  to sitting on the side of the bed?: 3  Moving to and from a bed to a chair (including a wheelchair)?: 3  Need to walk in hospital room?: 3  Climbing 3-5 steps with a railing?: 3  Total Score: 20     AM-PAC Raw Score CMS G-Code Modifier Level of Impairment Assistance   6 % Total / Unable   7 - 9 CM 80 - 100% Maximal Assist   10 - 14 CL 60 - 80% Moderate Assist   15 - 19 CK 40 - 60% Moderate Assist   20 - 22 CJ 20 - 40% Minimal Assist   23 CI 1-20% SBA / CGA   24 CH 0% Independent/ Mod I     Patient left HOB elevated with all lines intact, call button in reach and family present.    Assessment:   Angel Rincon is a 87 y.o. female with a medical diagnosis of <principal problem not specified> and presents with weakness,gait disturbance and decline from PLOF. Patient should benefit from PT for strengthening and gait training.    Rehab identified problem list/impairments: Rehab identified problem list/impairments: weakness, impaired balance, impaired functional mobilty, gait instability, decreased lower extremity function, impaired cardiopulmonary response to activity, impaired self care skills    Rehab potential is good.    Activity tolerance: Fair    Discharge recommendations: Discharge Facility/Level Of Care Needs: home with home health, home health PT, home health OT     Barriers to discharge:      Equipment recommendations: Equipment Needed After Discharge: none     GOALS:    Physical Therapy Goals        Problem: Physical Therapy Goal    Goal Priority Disciplines Outcome Goal Variances Interventions   Physical Therapy Goal     PT/OT, PT Ongoing (interventions implemented as appropriate)     Description:  Goals to be met by: 2017     Patient will increase functional independence with mobility by performin. Supine to sit with Modified [place]  2. Sit to stand transfer with Modified [place]  3. Gait  x 150 feet with Stand-by Assistance using Rolling Walker.                       PLAN:     Patient to be seen 6 x/week to address the above listed problems via gait training, therapeutic activities, therapeutic exercises  Plan of Care expires: 06/30/17  Plan of Care reviewed with: patient, family          Kiran Tam, PT  05/31/2017

## 2017-05-31 NOTE — PT/OT/SLP EVAL
Speech Language Pathology  Bedside Swallow Study  Evaluation    Angel Rincon   MRN: [id number]   [id number]    Admitting Diagnosis: <principal problem not specified>    Diet recommendations: Solid Diet Level: Dental Soft  Liquid Diet Level: Thin   No straws,   HOB to 90 degrees,   Small bites/sips,   1 bite/sip at a time,   Remain upright 30 minutes post bravo  Meds crushed in puree  Monitor for s/s of aspiration (coughing, choking, throat clearing, wet vocal quality) and stop feeding if increased difficulty noted    SLP Treatment Date: 05/31/17  Speech Start Time: 1348     Speech Stop Time: 1420     Speech Total (min): 32 min       TREATMENT BILLABLE MINUTES:  Eval Swallow and Oral Function 32    Diagnosis: <principal problem not specified>    Results for orders placed or performed during the hospital encounter of 05/29/17   X-Ray Chest 1 View    Narrative    AP chest radiograph.      Comparison: 5/29/17    Results: Sternotomy wires.  The cardiac silhouette is enlarged.  Mild increased interstitial lung markings.  No focal airspace disease.  No pleural effusion.  No pneumothorax.  The osseous structures appear normal for age.    Impression    No significant change from the prior study .      Electronically signed by: ROXANN CONTRERAS MD  Date:     05/31/17  Time:    13:03      H&P: Angel Rincon is a 87 y.o. female with CAD s/p stent and CABG 2013, HTN, osteoporosis, polycythemia, anemia, HLD, and history of nephrolithiasis presented to [facility] on 5/29/2017 with a primary complaint of fatigue and shortness of breath x 2 weeks.  The patient was in their usual state of health until ~2 weeks ago around mother's day when she began to have shortness of breath, cough, fatigue, and poor appetite.  She describes the cough as dry, and associated with a sore throat.  These findings were also associated with leg swelling and increased shortness of breath with ambulation.  This continued on for ~10 days,  "and then this past Friday she had a fever, measured at home as 101.  Thus she went to the doctor, who prescribed her Azithromycin and went up on her lasix (from 40 to 60).  Her leg swelling improved, however, her cough did not and she also developed a headache.  With continued symptoms she decided to present to the ED.  She denies any recent symptoms of dysuria, urinary frequency.    Past Medical History:   Diagnosis Date    Angina of effort     Arthritis     Back pain     Cancer     Hyperlipidemia     Hypertension     Osteoporosis     Polycythemia     Restless leg      Past Surgical History:   Procedure Laterality Date    ANGIOPLASTY      CORONARY ARTERY BYPASS GRAFT      HYSTERECTOMY      TONSILLECTOMY         Has the patient been evaluated by SLP for swallowing? : Yes  Keep patient NPO?: No   General Precautions: Standard, aspiration, fall          Social Hx: Patient lives alone.    Prior diet: "softer things" and thin liquids; pt does not wear dentures to eat; pt reports difficulty taking medications; pt reports frequent coughing/choking during meals which has gotten progressively worse over the past 3 weeks.    Subjective:  Pt awake and cooperative with family present in room. Daughter reports, "They told us it was aspiration pneumonia."   Patient goals: to go home.    Pain/Comfort  Pain Rating 1: 0/10    Objective:      Oral Musculature Evaluation  Oral Musculature: general weakness  Dentition: edentulous  Mucosal Quality: adequate  Mandibular Strength and Mobility: impaired  Oral Labial Strength and Mobility: WFL  Lingual Strength and Mobility: impaired strength, functional protrusion, functional anterior elevation, functional lateral movement  Velar Elevation: WFL  Buccal Strength and Mobility: decreased tone, flaccid, impaired  Volitional Cough: weak  Volitional Swallow: timely  Voice Prior to PO Intake: hoarse; impaired from baseline for about 3 weeks     Bedside Swallow Eval:  Consistencies " "Assessed: Thin liquids multiple self regulated sips of water via cup, Puree tsp bites of pudding x2 and Soft solids bites of peaches x2  Oral Phase: WFL; slow oral transit time 2/2 lack of dentition; no significant oral residue noted  Pharyngeal Phase: coughing/choking and throat clearing    Bedside swallow study completed. Pt reported difficulty taking po medications and frequent choking during meals (worse with liquids, and "feeling like things build up in my throat." When SLp asked about hoarse vocal quality, pt reported, "ot has been like this for about 3 weeks, too."  Pt would benefit from a Modified Barium Swallow Study to objectively r/o aspiration and determine the least restrictive and safest diet 2/2 noted impaired vocal quality, frequent coughing during meals, and aspiration pneumonia dx. Recommend: Dental soft diet, thin liquids by small cup sips, upright for all po intake and 30 minutes s/p po intake, crushed po medications, monitor for s/s of aspiration. ST will continue to follow.    Assessment:  Angel Rincon is a 87 y.o. female with a medical diagnosis of <principal problem not specified> and presents with inconsistent coughing throughout meals and aspiration pneumonia dx. ST will continue to follow.           Discharge recommendations: Discharge Facility/Level Of Care Needs: home health OT, home health PT, home with home health ( ST pending MBSS results)     Goals:    SLP Goals        Problem: SLP Goal    Goal Priority Disciplines Outcome   SLP Goal     SLP Ongoing (interventions implemented as appropriate)   Description:  Short Term Goals:   1. Pt will participate in a Modified Barium Swallow Study to objectively r/o aspiration and determine the least restrictive diet with updated goals to follow pending results.                      Plan:   Patient to be seen Therapy Frequency: 3 x/week   Plan of Care expires: 06/29/17  Plan of Care reviewed with: patient, family  SLP Follow-up?: Yes     "     SLP G-Codes  Functional Assessment Tool Used: noms  Score: 5  Functional Limitations: Swallowing  Swallow Current Status (): CJ  Swallow Goal Status (): HARSHAD Marie, CCC-SLP  05/31/2017

## 2017-05-31 NOTE — PLAN OF CARE
Problem: Patient Care Overview  Goal: Plan of Care Review  Outcome: Ongoing (interventions implemented as appropriate)  Pt on RA, SPO2 96 %.  Pt in no apparent respiratory distress. Will continue to monitor.

## 2017-05-31 NOTE — PLAN OF CARE
Pt lying in bed, no acute distress noted. Family at bedside. Pt remains on tele, afib in low 100s to 1teens. Bed alarm on, call light in reach, fall precautions in place. Report given to oncoming nurse.

## 2017-05-31 NOTE — PLAN OF CARE
Problem: SLP Goal  Goal: SLP Goal  Short Term Goals:   1. Pt will participate in a Modified Barium Swallow Study to objectively r/o aspiration and determine the least restrictive diet with updated goals to follow pending results.   Outcome: Ongoing (interventions implemented as appropriate)  Bedside swallow study completed. Pt would benefit from a Modified Barium Swallow Study to objectively r/o aspiration and determine the least restrictive and safest diet 2/2 noted impaired vocal quality, frequent coughing during meals, and aspiration pneumonia dx. Recommend: Dental soft diet, thin liquids by small cup sips, upright for all po intake and 30 minutes s/p po intake, crushed po medications, monitor for s/s of aspiration. ST will continue to follow.  LAUREANO Ford., CCC-SLP  05/31/2017

## 2017-05-31 NOTE — PROGRESS NOTES
[facility] IM Resident TERES[facility] Progress Note    Subjective:      Angel Rincon is a 87 y.o. female who is being followed by the [facility] IM service at [facility] for pneumonia and SAI.    This morning, the patient continues to complain of SOB.  Denies chest pain, endorses poor appetite.     Objective:   Last 24 Hour Vital Signs:  BP  Min: 91/55  Max: 118/60  Temp  Av.5 °F (36.9 °C)  Min: 98.1 °F (36.7 °C)  Max: 99.2 °F (37.3 °C)  Pulse  Av.2  Min: 100  Max: 140  Resp  Av.7  Min: 16  Max: 19  SpO2  Av.5 %  Min: 95 %  Max: 97 %  I/O last 3 completed shifts:  In: 1025 [P.O.:125; I.V.:800; IV Piggyback:100]  Out: 1 [Stool:1]    Physical Examination:  Vitals:    17 0500   BP:    Pulse: 109   Resp:    Temp:      Gen: NAD, laying in bed comfortably  HENT: NCAT, EOMI, MMM  CV: RRR, mild diastolic murmur best heard in lower R sternal border  Pulm: bibasilar crackles  Abd: normoactive, mild diffuse ttp, nondistended  Extremities: mild bilateral lower extremity pitting edema, 2+ pulses in all extremities  Skin: varicose veins to bilateral lower extremities  Neuro: moving all extremities well, gross sensation to light touch preserved, nonfocal  Psych: AAOx4, calm, cooperative, conversational      Laboratory:  Laboratory Data Reviewed: yes  Pertinent Findings:  WBC 20.7 -> 18.7 -> 24.10  Bands 0 -> 5 -> 2  Na 128 -> 129 -> 128  HCO3 15 -> 17 -> 16  Cr 1.6 -> 1.5 -> 1.8    Microbiology Data Reviewed: yes  Pertinent Findings:   Blood Cx NGTD   Urine Cx pending    Other Results:  EKG (my interpretation): none new    Radiology Data Reviewed: yes  Pertinent Findings:  none    Current Medications:     Infusions:        Scheduled:   azithromycin  250 mg Oral Daily    carvedilol  6.25 mg Oral BID WM    cefTRIAXone (ROCEPHIN) IVPB  1 g Intravenous Q12H    hydroxyurea  500 mg Oral Daily    predniSONE  50 mg Oral Daily    rivaroxaban  20 mg Oral Daily    rosuvastatin  10 mg Oral Daily         PRN:  albuterol-ipratropium 2.5mg-0.5mg/3mL, dextrose 50%, dextrose 50%, glucagon (human recombinant), glucose, glucose, hydrocodone-acetaminophen 7.5-325mg    Antibiotics and Day Number of Therapy:  Rocephin, Azithro 5/30-current    Lines and Day Number of Therapy:  PIV    Assessment:     Angel Rincon is a 87 y.o.female with  Patient Active Problem List    Diagnosis Date Noted    Dehydration     Hyponatremia     Metabolic acidosis     SAI (acute kidney injury) 05/29/2017    Narcotic dependence 04/21/2017    Nephrolithiasis 03/09/2017    Acute cystitis without hematuria 03/09/2017    Non-rheumatic tricuspid valve insufficiency 03/09/2017    Chronic diastolic heart failure 03/02/2017    Leukocytosis 03/02/2017    Thrombocytosis 03/02/2017    Normocytic anemia 03/02/2017    Chewing tobacco nicotine dependence 03/02/2017    Bilateral lower leg cellulitis 03/02/2017    Shaking spells     Coagulation defect 12/12/2016    Rheumatoid factor positive with cyclic citrullinated peptide (CCP) antibody negative 08/18/2016    Lumbar radiculopathy 07/18/2016    Peripheral polyneuropathy 07/18/2016    Chronic insomnia 07/07/2016    Chronic alcoholism in remission 06/14/2016    Chronic diarrhea 06/14/2016    Peripheral arterial disease 06/04/2016    Atrial fibrillation, controlled 06/04/2016    Polycythemia vera 06/04/2016    Restless leg syndrome 05/20/2015    Acute pain of left hip 02/03/2015    Back pain 01/13/2015    UTI (lower urinary tract infection) 01/13/2015    HLD (hyperlipidemia) 08/19/2014    S/P CABG (coronary artery bypass graft) 08/14/2013    HTN (hypertension) 08/14/2013        Plan:     Pneumonia  - CXR with interstitial pattern  - patient with indolent respiratory course  - likely atypical pneumonia with procal 1.11  - covering with rocephin/azithro    SAI  Cr 1.6 w/ baseline 0.9. UA w/ negative LE and nitrite, 3 WBC, +hyaline casts  - Suspect prerenal, will hydrate gently given  h/o HFpEF  - Procal 1.11  - FeUrea 7.6% consistent with prerenal  - F/u UCx pending  - will discuss further fluids given poor PO intake     Leukocytosis  CXR w/ no focal consolidation. UA w/ no e/o UTI. Diffuse abd pain and epigastric TTP, bili elevated.  - RUQ ultrasound with cholelithiasis, no gallbladder thickening  - rocephin and azithromycin as above    Hyponatremia  Na 128. Hypovolemic by history and exam. S/p 500ml NS bolus in ED.   - LR infusion at 100ml/hr  - Follow daily BMPs     RAZO w/ HFpEF  SOB on exertion worse than baseline x 2-3 weeks. CXR w/ no pulm edema, improved from prior study. BNP 400s, baseline. EKG w/ afib, rate controlled, no acute ischemic changes. Last echo w/ nml EF, no comment on DD but PASP WNL 39.  - Monitor for s/s volume overload  - holding lasix for above SAI     Hypoalbuminemia  2.9.   - Follow CMP, encourage nutrition     Afib on anticoagulation  On Xarelto. Rate controlled.  - Con't Xarelto, BB     HTN  Controlled. Con't home antihypertensives.     Polycythemia Vera  Con't home hydroxyurea     Normocytic anemia  Iron studies suggest WILTON.  - Iron supplementation on discharge     CAD s/p PCI x 3 and CABG 3v  No CP. Tn 0.022. EKG w/ no acute ischemic changes.     HLD  Con't home statin.     Diet: Cardiac  Prophylaxis: heparin  Code: Full    Dispo: pending clinical improvement and resolution of SAI     Collins Frost MD HO-1  U Internal Medicine Team B    [facility] Medicine Hospitalist Pager numbers:   [facility] Hospitalist Medicine Team A (Elena/Riri): [phone]  [facility] Hospitalist Medicine Team B (Angelo/Bradley):  [phone]

## 2017-05-31 NOTE — PROGRESS NOTES
.Pharmacy New Medication Education    Patient accepted medication education.    Pharmacy educated patient on the following medications, using the teach-back method.     Ceftriaxone    Learners of pharmacy medication education included:  patient    Patient +/- learner response:  verbalize understanding

## 2017-05-31 NOTE — PLAN OF CARE
Problem: Physical Therapy Goal  Goal: Physical Therapy Goal  Goals to be met by: 2017     Patient will increase functional independence with mobility by performin. Supine to sit with Modified [place]  2. Sit to stand transfer with Modified [place]  3. Gait  x 150 feet with Stand-by Assistance using Rolling Walker.     Outcome: Ongoing (interventions implemented as appropriate)  Recommend Home with HH OT and PT  No DME needs noted at this time

## 2017-06-01 ENCOUNTER — TELEPHONE (OUTPATIENT)
Dept: INTERNAL MEDICINE | Facility: CLINIC | Age: 82
End: 2017-06-01

## 2017-06-01 ENCOUNTER — TELEPHONE (OUTPATIENT)
Dept: HEMATOLOGY/ONCOLOGY | Facility: CLINIC | Age: 82
End: 2017-06-01

## 2017-06-01 LAB
ALBUMIN SERPL BCP-MCNC: 2.8 G/DL
ALP SERPL-CCNC: 89 U/L
ALT SERPL W/O P-5'-P-CCNC: 18 U/L
ANION GAP SERPL CALC-SCNC: 13 MMOL/L
ANISOCYTOSIS BLD QL SMEAR: ABNORMAL
AST SERPL-CCNC: 33 U/L
BASOPHILS NFR BLD: 0 %
BILIRUB SERPL-MCNC: 0.9 MG/DL
BLASTS NFR BLD MANUAL: 7 %
BUN SERPL-MCNC: 38 MG/DL
BURR CELLS BLD QL SMEAR: ABNORMAL
C DIFF GDH STL QL: NEGATIVE
C DIFF TOX A+B STL QL IA: NEGATIVE
CALCIUM SERPL-MCNC: 8.1 MG/DL
CHLORIDE SERPL-SCNC: 100 MMOL/L
CO2 SERPL-SCNC: 15 MMOL/L
CREAT SERPL-MCNC: 2 MG/DL
DACRYOCYTES BLD QL SMEAR: ABNORMAL
DIFFERENTIAL METHOD: ABNORMAL
EOSINOPHIL NFR BLD: 0 %
ERYTHROCYTE [DISTWIDTH] IN BLOOD BY AUTOMATED COUNT: 20 %
EST. GFR  (AFRICAN AMERICAN): 25 ML/MIN/1.73 M^2
EST. GFR  (NON AFRICAN AMERICAN): 22 ML/MIN/1.73 M^2
GLUCOSE SERPL-MCNC: 148 MG/DL
HCT VFR BLD AUTO: 34.3 %
HGB BLD-MCNC: 10.5 G/DL
HYPOCHROMIA BLD QL SMEAR: ABNORMAL
LYMPHOCYTES NFR BLD: 8 %
MCH RBC QN AUTO: 23.4 PG
MCHC RBC AUTO-ENTMCNC: 30.6 %
MCV RBC AUTO: 77 FL
MONOCYTES NFR BLD: 4 %
NEUTROPHILS NFR BLD: 79 %
NEUTS BAND NFR BLD MANUAL: 2 %
NRBC BLD-RTO: ABNORMAL /100 WBC
OVALOCYTES BLD QL SMEAR: ABNORMAL
PLATELET # BLD AUTO: 227 K/UL
PLATELET BLD QL SMEAR: ABNORMAL
PMV BLD AUTO: ABNORMAL FL
POIKILOCYTOSIS BLD QL SMEAR: ABNORMAL
POLYCHROMASIA BLD QL SMEAR: ABNORMAL
POTASSIUM SERPL-SCNC: 3.6 MMOL/L
PROT SERPL-MCNC: 5.5 G/DL
RBC # BLD AUTO: 4.48 M/UL
SODIUM SERPL-SCNC: 128 MMOL/L
WBC # BLD AUTO: 23.2 K/UL
WBC #/AREA STL HPF: NORMAL /[HPF]

## 2017-06-01 PROCEDURE — 36415 COLL VENOUS BLD VENIPUNCTURE: CPT

## 2017-06-01 PROCEDURE — G8997 SWALLOW GOAL STATUS: HCPCS | Mod: CI

## 2017-06-01 PROCEDURE — 80053 COMPREHEN METABOLIC PANEL: CPT

## 2017-06-01 PROCEDURE — 25000003 PHARM REV CODE 250: Performed by: HOSPITALIST

## 2017-06-01 PROCEDURE — 87427 SHIGA-LIKE TOXIN AG IA: CPT

## 2017-06-01 PROCEDURE — 85027 COMPLETE CBC AUTOMATED: CPT

## 2017-06-01 PROCEDURE — 94640 AIRWAY INHALATION TREATMENT: CPT

## 2017-06-01 PROCEDURE — 89055 LEUKOCYTE ASSESSMENT FECAL: CPT

## 2017-06-01 PROCEDURE — 92611 MOTION FLUOROSCOPY/SWALLOW: CPT

## 2017-06-01 PROCEDURE — 25000003 PHARM REV CODE 250: Performed by: STUDENT IN AN ORGANIZED HEALTH CARE EDUCATION/TRAINING PROGRAM

## 2017-06-01 PROCEDURE — 94761 N-INVAS EAR/PLS OXIMETRY MLT: CPT

## 2017-06-01 PROCEDURE — 87046 STOOL CULTR AEROBIC BACT EA: CPT | Mod: 59

## 2017-06-01 PROCEDURE — 21400001 HC TELEMETRY ROOM

## 2017-06-01 PROCEDURE — 92526 ORAL FUNCTION THERAPY: CPT

## 2017-06-01 PROCEDURE — 87045 FECES CULTURE AEROBIC BACT: CPT

## 2017-06-01 PROCEDURE — 63600175 PHARM REV CODE 636 W HCPCS: Performed by: STUDENT IN AN ORGANIZED HEALTH CARE EDUCATION/TRAINING PROGRAM

## 2017-06-01 PROCEDURE — 25000003 PHARM REV CODE 250: Performed by: INTERNAL MEDICINE

## 2017-06-01 PROCEDURE — 85007 BL SMEAR W/DIFF WBC COUNT: CPT

## 2017-06-01 PROCEDURE — G8998 SWALLOW D/C STATUS: HCPCS | Mod: CI

## 2017-06-01 PROCEDURE — 99222 1ST HOSP IP/OBS MODERATE 55: CPT | Mod: ,,, | Performed by: INTERNAL MEDICINE

## 2017-06-01 PROCEDURE — 87449 NOS EACH ORGANISM AG IA: CPT

## 2017-06-01 PROCEDURE — 25000242 PHARM REV CODE 250 ALT 637 W/ HCPCS: Performed by: STUDENT IN AN ORGANIZED HEALTH CARE EDUCATION/TRAINING PROGRAM

## 2017-06-01 RX ORDER — SODIUM CHLORIDE 9 MG/ML
INJECTION, SOLUTION INTRAVENOUS ONCE
Status: COMPLETED | OUTPATIENT
Start: 2017-06-01 | End: 2017-06-01

## 2017-06-01 RX ORDER — ACETAMINOPHEN 325 MG/1
650 TABLET ORAL ONCE
Status: COMPLETED | OUTPATIENT
Start: 2017-06-01 | End: 2017-06-01

## 2017-06-01 RX ORDER — IBUPROFEN 200 MG
1 TABLET ORAL DAILY
Status: DISCONTINUED | OUTPATIENT
Start: 2017-06-02 | End: 2017-06-01

## 2017-06-01 RX ORDER — IBUPROFEN 200 MG
1 TABLET ORAL DAILY
Status: DISCONTINUED | OUTPATIENT
Start: 2017-06-01 | End: 2017-06-03

## 2017-06-01 RX ADMIN — RIVAROXABAN 20 MG: 20 TABLET, FILM COATED ORAL at 12:06

## 2017-06-01 RX ADMIN — FAMOTIDINE 20 MG: 20 TABLET, FILM COATED ORAL at 01:06

## 2017-06-01 RX ADMIN — IPRATROPIUM BROMIDE AND ALBUTEROL SULFATE 3 ML: .5; 3 SOLUTION RESPIRATORY (INHALATION) at 01:06

## 2017-06-01 RX ADMIN — PREDNISONE 50 MG: 10 TABLET ORAL at 12:06

## 2017-06-01 RX ADMIN — CARVEDILOL 6.25 MG: 6.25 TABLET, FILM COATED ORAL at 05:06

## 2017-06-01 RX ADMIN — MOXIFLOXACIN HYDROCHLORIDE 400 MG: 400 INJECTION, SOLUTION INTRAVENOUS at 01:06

## 2017-06-01 RX ADMIN — IPRATROPIUM BROMIDE AND ALBUTEROL SULFATE 3 ML: .5; 3 SOLUTION RESPIRATORY (INHALATION) at 09:06

## 2017-06-01 RX ADMIN — ACETAMINOPHEN 650 MG: 325 TABLET ORAL at 05:06

## 2017-06-01 RX ADMIN — HYDROCODONE BITARTRATE AND ACETAMINOPHEN 1 TABLET: 7.5; 325 TABLET ORAL at 08:06

## 2017-06-01 RX ADMIN — LACTOBACILLUS TAB 4 TABLET: TAB at 12:06

## 2017-06-01 RX ADMIN — HYDROCODONE BITARTRATE AND ACETAMINOPHEN 1 TABLET: 7.5; 325 TABLET ORAL at 02:06

## 2017-06-01 RX ADMIN — ROSUVASTATIN CALCIUM 10 MG: 10 TABLET ORAL at 12:06

## 2017-06-01 RX ADMIN — LACTOBACILLUS TAB 4 TABLET: TAB at 05:06

## 2017-06-01 RX ADMIN — SODIUM CHLORIDE: 0.9 INJECTION, SOLUTION INTRAVENOUS at 06:06

## 2017-06-01 RX ADMIN — SODIUM BICARBONATE: 84 INJECTION, SOLUTION INTRAVENOUS at 10:06

## 2017-06-01 RX ADMIN — IPRATROPIUM BROMIDE AND ALBUTEROL SULFATE 3 ML: .5; 3 SOLUTION RESPIRATORY (INHALATION) at 07:06

## 2017-06-01 RX ADMIN — SODIUM BICARBONATE: 84 INJECTION, SOLUTION INTRAVENOUS at 01:06

## 2017-06-01 RX ADMIN — NICOTINE 1 PATCH: 21 PATCH, EXTENDED RELEASE TRANSDERMAL at 05:06

## 2017-06-01 RX ADMIN — SODIUM BICARBONATE: 84 INJECTION, SOLUTION INTRAVENOUS at 12:06

## 2017-06-01 RX ADMIN — HYDROXYUREA 500 MG: 500 CAPSULE ORAL at 12:06

## 2017-06-01 NOTE — PT/OT/SLP PROGRESS
Occupational Therapy      Angel Rincon  MRN: [id number]    Patient not seen today secondary to  AM in swallow study PM nurse requested to return in 1 hour secondary to just started 2 antibiotics. Will follow-up at later time.    ROLA Matthews  6/1/2017

## 2017-06-01 NOTE — PT/OT/SLP PROGRESS
Physical Therapy      Angelvandana Rincon  MRN: [id number]    Patient not seen today secondary to Unavailable (Comment), Patient unwilling to participate. Patient sleeping 1342 family refused and earlier at 1250 eating lunch. Will see tomorrow 6/2/2017    Kiran Tam, PT

## 2017-06-01 NOTE — PLAN OF CARE
Problem: Patient Care Overview  Goal: Plan of Care Review  Pt on room air with SpO2  96%. Tx given with no adverse reactions. No respiratory distress noted. Will continue to monitor.

## 2017-06-01 NOTE — TELEPHONE ENCOUNTER
Consult info given to Dr. Hickey.    ----- Message from Melissa Asif sent at 2017 10:27 AM CDT -----  Contact: Claudette from [facility]/[phone]  CONSULTS:     Patient:     :     Clinic#:     Room number: 404    Referring MD: Dr. Stiles    Diagnosis: Leukocytosis with Blast seen on smear. Path recommending Bone Marrow biopsy  but could be reactive from pneumonia    Person calling:     Claudette from [facility]/[phone]

## 2017-06-01 NOTE — PLAN OF CARE
Problem: Breathing Pattern Ineffective (Adult)  Goal: Identify Related Risk Factors and Signs and Symptoms  Related risk factors and signs and symptoms are identified upon initiation of Human Response Clinical Practice Guideline (CPG)  Outcome: Ongoing (interventions implemented as appropriate)  Patient on Tele AFIB. NPO after MN. Bed alarm in place. Family at bedside. Pain administered for restless leg syndrome. Call light within reach.

## 2017-06-01 NOTE — PLAN OF CARE
Problem: Patient Care Overview  Goal: Plan of Care Review  Outcome: Ongoing (interventions implemented as appropriate)  Pt stable, no apparent distress noted, family at bedside,fall/safety/contact precautions maintained during shift, Bed in lowest position, locked , call light within reach, side rails up x's 2, slip resistant socks on. Bed alarm on,will continue to monitor pt.     MBSS to be completed today, PO meds given crushed with out difficulty,continue to monitor pt renal function, Sodium bicarb drib continued, stool studies sent off pt due pt continuing to have diarrhea, IV abx continued, plan of care reviewed with pt and pt family, pt family verbalized understanding.

## 2017-06-01 NOTE — PLAN OF CARE
Future Appointments  Date Time Provider Department Center   6/9/2017 2:00 PM Romi Rogers MD [facility] [place] [facility]   6/29/2017 11:00 AM Pino Estrada MD [facility] [place]     Gave daughter Priority clinic brochure. She verbalized understanding as she has taken patient to priority clinic before. PT/OT recommending home health upon discharge.    Stool for c-diff pending.       06/01/17 1144   Discharge Reassessment   Assessment Type Discharge Planning Reassessment   Can the patient answer the patient profile reliably? Yes, cognitively intact   Describe the patient's ability to walk at the present time. Minor restrictions or changes   During the past month, has the patient often been bothered by feeling down, depressed or hopeless? Yes   During the past month, has the patient often been bothered by little interest or pleasure in doing things? Yes   Discharge plan remains the same: Yes   Discharge Plan A Home;Home with family;Home Health   Explained to the the patient/caregiver why the discharge planned changed: Yes   Involved the patient/caregiver in establishing a new discharge plan: Yes     Ann Kuo, RN, CCM, CMSRN  RN Transition Navigator  [phone]

## 2017-06-01 NOTE — HPI
This is an elderly 87-year-old female with history of CAD and CABG and polycythemia vera admitted with pneumonia and is on antibiotics.    She was noted to have a WBC of 23,000 with 79% granulocytes.  Hemoglobin 10.5, platelets 227,000.    Peripheral smear reviewed.  Drop cells, blastoid cells and circulating nucleated red cells.  Hence hematology consulted.

## 2017-06-01 NOTE — PROCEDURES
Modifed Barium Swallow Study  Speech Start Time: 1025 11:50  Speech Stop Time: 1047 12:00  Speech Total (min): 32 min    SLP Treatment Date: 06/01/17    Reason for Referral  Patient was referred for a Modified Barium Swallow Study to assess the efficiency of his/her swallow function, rule out aspiration and make recommendations regarding safe dietary consistencies, effective compensatory strategies, and safe eating environment.     Diagnosis   <principal problem not specified>    Past Medical History:   Diagnosis Date    Angina of effort     Arthritis     Back pain     Cancer     Hyperlipidemia     Hypertension     Osteoporosis     Polycythemia     Restless leg      Past Surgical History:   Procedure Laterality Date    ANGIOPLASTY      CORONARY ARTERY BYPASS GRAFT      HYSTERECTOMY      TONSILLECTOMY          General Precautions: fall, hearing impaired       Swallow Precautions:  1. UPRIGHT for all meals and for all PO solid intake   2. Dental soft Tray/ Thin Liquids  3. Cup swallows   4. Alternate sips/bites  5. Eat slowly  6. Min assist for feeding  7. Whole medications one by one should follow with 3 - 6oz fluid, then STAY upright at least 30min  Add ensure with meals to increase caloric needs    Oral Peripheral Examination  Oral Musculature Evaluation  Oral Musculature: general weakness  Dentition: edentulous  Mucosal Quality: adequate  Mandibular Strength and Mobility: impaired  Oral Labial Strength and Mobility: WFL  Lingual Strength and Mobility: impaired strength, functional protrusion, functional anterior elevation, functional lateral movement  Velar Elevation: WFL  Buccal Strength and Mobility: decreased tone, flaccid, impaired  Volitional Cough: weak  Volitional Swallow: timely  Voice Prior to PO Intake: hoarse; impaired from baseline for about 3 weeks    Consistencies Administered:  5cc, 10cc, 20cc, and self regulated sips of thin liquid barium via cup; tsp bites of barium pudding; tsp bites  "of diced peaches (drained) coated in barium powder; 1/2"  kaden cracker with barium paste frosting, and 1 barium tablet.  MBSS completed in lateral view.     Oral Preparation / Oral Phase:  Adequate oral prep and mastication noted despite no dentition in place. Pt with very trace spillage along tongue base after liquids and solids. Pt able to clear with dry swallows on her own.       Pharyngeal Phase  Mild pharyngeal dysphagia but deeemd appropriate and efficient for pt's age and gender. Pharyngeal dysphagia characterized by MIld delay in trigger of swallow reflex with mild premature spillage into the vallecula on thin liquids with good clearance of mild residuals after dry swallow elicited. Pt demonstrated some decreased base of tongue retraction, dcr'd laryngeal lift noted and fair airway closure was noted. NO evidence of aspiration or penetration noted across any consistencies.   Rosenbek 8-Point Penetration-Aspiration Scale Score: 1: Material does not enter the airway.      Cervical Esophageal Phase  No evidence of backflow from upper cervical esophagus into the pharynx   Pt did exhibit large osteophyte at c5-c6 level but it did not appear to impinge on pt's swallowing mechanism.       Impressions   Oral phase- WFL  Mild pharyngeal dysphagia but deeemd appropriate and efficient for pt's age and gender. Pharyngeal dysphagia characterized by MIld delay in trigger of swallow reflex with mild premature spillage into the vallecula on thin liquids with good clearance of mild residuals after dry swallow elicited. Pt demonstrated some decreased base of tongue retraction, dcr'd laryngeal lift noted and fair airway closure was noted. NO evidence of aspiration or penetration noted across any consistencies.       Prognosis/Plan/Education  Prognosis: good at this time given family support and efficient swallow mechanism.   Family, daughter educated on swallow precautions.   MD team called by phone with recs.     Care Plan "    SLP Goals        Problem: SLP Goal    Goal Priority Disciplines Outcome   SLP Goal     SLP Ongoing (interventions implemented as appropriate)   Description:  Short Term Goals:   1. Pt will participate in a Modified Barium Swallow Study to objectively r/o aspiration and determine the least restrictive diet with updated goals to follow pending results.                      G-Codes  Functional Assessment Tool Used: NOMS  Score: 6  Functional Limitations: Swallowing  Swallow Goal Status (): CI  Swallow Discharge Status (): CI     HARSHAD Hudson, CCC-SLP  Speech Pathologist 6/1/2017

## 2017-06-01 NOTE — PLAN OF CARE
Problem: Patient Care Overview  Goal: Plan of Care Review  Outcome: Ongoing (interventions implemented as appropriate)  Pt stable, no apparent distress noted, family at bedside,fall and safety precautions maintained, during shift, Bed in lowest position, locked , call light within reach, side rails up x's 2, slip resistant socks on. Bed alarm on,will continue to monitor pt.    Speech/swallow evaluation ordered  due to pt having trouble swallowing per MD, MBSS to be completed in AM, aspiration precautions maintained, Norco given for pt LLE pain and also warm compress applied, continued to monitor pt renal function,PT/OT consulted for generalized weakness,plan of care reviewed with pt and pt family, pt family verbalized understanding.

## 2017-06-01 NOTE — PT/OT/SLP PROGRESS
Physical Therapy      Angel Rincon  MRN: [id number]    Patient not seen this am 2/2 RAYMOND speech testing, will follow as able    Kiran Tam, PT

## 2017-06-01 NOTE — SUBJECTIVE & OBJECTIVE
Oncology Treatment Plan:   [No treatment plan]    Medications:  Continuous Infusions:   custom IV infusion builder 125 mL/hr at 06/01/17 1353     Scheduled Meds:   albuterol-ipratropium 2.5mg-0.5mg/3mL  3 mL Nebulization Q6H WAKE    carvedilol  6.25 mg Oral BID WM    famotidine  20 mg Oral Daily    hydroxyurea  500 mg Oral Daily    Lactobacillus acidoph-L.bulgar  4 tablet Oral TID WM    moxifloxacin  400 mg Intravenous Q24H    nicotine  1 patch Transdermal Daily    predniSONE  50 mg Oral Daily    rivaroxaban  20 mg Oral Daily    rosuvastatin  10 mg Oral Daily     PRN Meds:dextrose 50%, dextrose 50%, glucagon (human recombinant), glucose, glucose, hydrocodone-acetaminophen 7.5-325mg     Review of Systems   Constitutional: Positive for fatigue. Negative for chills.   Eyes: Positive for visual disturbance. Negative for redness.   Respiratory: Positive for cough and shortness of breath. Negative for wheezing.    Cardiovascular: Negative for palpitations.   Gastrointestinal: Positive for diarrhea.   Musculoskeletal: Positive for arthralgias and gait problem.   Neurological: Negative for seizures and speech difficulty.   Hematological: Negative for adenopathy. Does not bruise/bleed easily.   Psychiatric/Behavioral: Negative for behavioral problems and confusion.     Objective:     Vital Signs (Most Recent):  Temp: 98.2 °F (36.8 °C) (06/01/17 1600)  Pulse: (!) 127 (06/01/17 1600)  Resp: 16 (06/01/17 1600)  BP: (!) 146/87 (06/01/17 1600)  SpO2: 97 % (06/01/17 1544) Vital Signs (24h Range):  Temp:  [97.8 °F (36.6 °C)-99.2 °F (37.3 °C)] 98.2 °F (36.8 °C)  Pulse:  [] 127  Resp:  [15-21] 16  SpO2:  [93 %-100 %] 97 %  BP: (105-146)/(57-91) 146/87     Weight: 54.8 kg (120 lb 14.4 oz)  Body mass index is 28.1 kg/m².  Body surface area is 1.46 meters squared.      Intake/Output Summary (Last 24 hours) at 06/01/17 1742  Last data filed at 06/01/17 1600   Gross per 24 hour   Intake             1100 ml   Output               125 ml   Net              975 ml       Physical Exam   Constitutional: She is oriented to person, place, and time. No distress.   HENT:   Mouth/Throat: No oropharyngeal exudate.   Eyes: No scleral icterus.   Neck: Neck supple.   Cardiovascular: Normal rate.  Exam reveals no gallop.    Pulmonary/Chest: Effort normal. She has no wheezes. She has no rales.   Abdominal: Soft. There is no tenderness.   Musculoskeletal: She exhibits no edema.   Lymphadenopathy:     She has no cervical adenopathy.   Neurological: She is alert and oriented to person, place, and time.   Skin: She is not diaphoretic.       Significant Labs:   All pertinent labs from the last 24 hours have been reviewed.    Diagnostic Results:  I have reviewed and interpreted all pertinent imaging results/findings within the past 24 hours.

## 2017-06-01 NOTE — PLAN OF CARE
Problem: Patient Care Overview  Goal: Plan of Care Review  Outcome: Ongoing (interventions implemented as appropriate)  Received pt on RA; no distress noted. Tolerated tx well; willl cont to monitor.

## 2017-06-01 NOTE — PROGRESS NOTES
[facility]  Hematology/Oncology  Progress Note    Patient Name: Angel Rincon  Admission Date: 5/29/2017  Hospital Length of Stay: 3 days  Code Status: Full Code     Subjective:     HPI:  This is an elderly 87-year-old female with history of CAD and CABG and polycythemia vera admitted with pneumonia and is on antibiotics.    She was noted to have a WBC of 23,000 with 79% granulocytes.  Hemoglobin 10.5, platelets 227,000.    Peripheral smear reviewed.  Drop cells, blastoid cells and circulating nucleated red cells.  Hence hematology consulted.        Oncology Treatment Plan:   [No treatment plan]    Medications:  Continuous Infusions:   custom IV infusion builder 125 mL/hr at 06/01/17 1353     Scheduled Meds:   albuterol-ipratropium 2.5mg-0.5mg/3mL  3 mL Nebulization Q6H WAKE    carvedilol  6.25 mg Oral BID WM    famotidine  20 mg Oral Daily    hydroxyurea  500 mg Oral Daily    Lactobacillus acidoph-L.bulgar  4 tablet Oral TID WM    moxifloxacin  400 mg Intravenous Q24H    nicotine  1 patch Transdermal Daily    predniSONE  50 mg Oral Daily    rivaroxaban  20 mg Oral Daily    rosuvastatin  10 mg Oral Daily     PRN Meds:dextrose 50%, dextrose 50%, glucagon (human recombinant), glucose, glucose, hydrocodone-acetaminophen 7.5-325mg     Review of Systems   Constitutional: Positive for fatigue. Negative for chills.   Eyes: Positive for visual disturbance. Negative for redness.   Respiratory: Positive for cough and shortness of breath. Negative for wheezing.    Cardiovascular: Negative for palpitations.   Gastrointestinal: Positive for diarrhea.   Musculoskeletal: Positive for arthralgias and gait problem.   Neurological: Negative for seizures and speech difficulty.   Hematological: Negative for adenopathy. Does not bruise/bleed easily.   Psychiatric/Behavioral: Negative for behavioral problems and confusion.     Objective:     Vital Signs (Most Recent):  Temp: 98.2 °F (36.8 °C) (06/01/17  1600)  Pulse: (!) 127 (06/01/17 1600)  Resp: 16 (06/01/17 1600)  BP: (!) 146/87 (06/01/17 1600)  SpO2: 97 % (06/01/17 1544) Vital Signs (24h Range):  Temp:  [97.8 °F (36.6 °C)-99.2 °F (37.3 °C)] 98.2 °F (36.8 °C)  Pulse:  [] 127  Resp:  [15-21] 16  SpO2:  [93 %-100 %] 97 %  BP: (105-146)/(57-91) 146/87     Weight: 54.8 kg (120 lb 14.4 oz)  Body mass index is 28.1 kg/m².  Body surface area is 1.46 meters squared.      Intake/Output Summary (Last 24 hours) at 06/01/17 1742  Last data filed at 06/01/17 1600   Gross per 24 hour   Intake             1100 ml   Output              125 ml   Net              975 ml       Physical Exam   Constitutional: She is oriented to person, place, and time. No distress.   HENT:   Mouth/Throat: No oropharyngeal exudate.   Eyes: No scleral icterus.   Neck: Neck supple.   Cardiovascular: Normal rate.  Exam reveals no gallop.    Pulmonary/Chest: Effort normal. She has no wheezes. She has no rales.   Abdominal: Soft. There is no tenderness.   Musculoskeletal: She exhibits no edema.   Lymphadenopathy:     She has no cervical adenopathy.   Neurological: She is alert and oriented to person, place, and time.   Skin: She is not diaphoretic.       Significant Labs:   All pertinent labs from the last 24 hours have been reviewed.    Diagnostic Results:  I have reviewed and interpreted all pertinent imaging results/findings within the past 24 hours.    Assessment/Plan:     Leukocytosis    The peripheral smear shows cells concerning for a primary hematological process such as myelofibrosis.  The blood counts are however stable at this point.    On further discussion with the patient and her 2 daughters in the room, she sees  - hematology oncology near [facility].  I suggested she follow-up with him as she has been doing.  Will not initiate further workup at this time given the fact that she already has an established hematologist.                Thank you for your consult. I  will sign off. Please contact us if you have any additional questions.     Fei Hickey MD  Hematology/Oncology  [facility]

## 2017-06-01 NOTE — PROGRESS NOTES
[facility] Resident [facility] Progress Note    Subjective:      Angel Rincon is a 87 y.o. female who is being followed by the [facility] service at [facility] for pneumonia and SAI.    Feeling less SOB this morning, some wheezing overnight. Coughing decreased. Denies any chest pain.      Objective:   Last 24 Hour Vital Signs:  BP  Min: 105/57  Max: 134/77  Temp  Av.1 °F (36.7 °C)  Min: 97.6 °F (36.4 °C)  Max: 98.4 °F (36.9 °C)  Pulse  Av.9  Min: 92  Max: 122  Resp  Av.5  Min: 16  Max: 21  SpO2  Av.2 %  Min: 93 %  Max: 98 %  I/O last 3 completed shifts:  In: 1430 [P.O.:680; I.V.:700; IV Piggyback:50]  Out: 552 [Urine:550; Stool:2]    Physical Examination:  Vitals:    17 0430   BP: (!) 105/57   Pulse: (!) 113   Resp: 16   Temp: 97.9 °F (36.6 °C)     Gen: NAD, laying in bed comfortably  HENT: NCAT, EOMI, MMM  CV: RRR, mild diastolic murmur best heard in lower R sternal border  Pulm: bibasilar crackles  Abd: normoactive, mild diffuse ttp, nondistended  Extremities: mild bilateral lower extremity pitting edema, 2+ pulses in all extremities  Skin: varicose veins to bilateral lower extremities  Neuro: moving all extremities well, gross sensation to light touch preserved, nonfocal  Psych: AAOx4, calm, cooperative, conversational      Laboratory:  Laboratory Data Reviewed: yes  Pertinent Findings:  WBC 20.7 -> 18.7 -> 24.10 -> 23.2  Bands 0 -> 5 -> 2 ->2  Na 128 -> 129 -> 128-> 128  HCO3 15 -> 17 -> 16 ->15  Cr 1.6 -> 1.5 -> 1.8 ->2.0    Microbiology Data Reviewed: yes  Pertinent Findings:   Blood Cx NGTD   Urine Cx NGTD    Other Results:  EKG (my interpretation): none new    Radiology Data Reviewed: yes  Pertinent Findings:  none    Current Medications:     Infusions:        Scheduled:   sodium chloride 0.9%   Intravenous Once    albuterol-ipratropium 2.5mg-0.5mg/3mL  3 mL Nebulization Q6H WAKE    carvedilol  6.25 mg Oral BID WM    famotidine  20 mg Oral Daily    hydroxyurea   500 mg Oral Daily    moxifloxacin  400 mg Intravenous Q24H    predniSONE  50 mg Oral Daily    rivaroxaban  20 mg Oral Daily    rosuvastatin  10 mg Oral Daily        PRN:  dextrose 50%, dextrose 50%, glucagon (human recombinant), glucose, glucose, hydrocodone-acetaminophen 7.5-325mg    Antibiotics and Day Number of Therapy:  Rocephin, Azithro 5/30-current    Lines and Day Number of Therapy:  PIV    Assessment:     Angel Rincon is a 87 y.o.female with  Patient Active Problem List    Diagnosis Date Noted    Dehydration     Hyponatremia     Metabolic acidosis     SAI (acute kidney injury) 05/29/2017    Narcotic dependence 04/21/2017    Nephrolithiasis 03/09/2017    Acute cystitis without hematuria 03/09/2017    Non-rheumatic tricuspid valve insufficiency 03/09/2017    Chronic diastolic heart failure 03/02/2017    Leukocytosis 03/02/2017    Thrombocytosis 03/02/2017    Normocytic anemia 03/02/2017    Chewing tobacco nicotine dependence 03/02/2017    Bilateral lower leg cellulitis 03/02/2017    Shaking spells     Coagulation defect 12/12/2016    Rheumatoid factor positive with cyclic citrullinated peptide (CCP) antibody negative 08/18/2016    Lumbar radiculopathy 07/18/2016    Peripheral polyneuropathy 07/18/2016    Chronic insomnia 07/07/2016    Chronic alcoholism in remission 06/14/2016    Chronic diarrhea 06/14/2016    Peripheral arterial disease 06/04/2016    Atrial fibrillation, controlled 06/04/2016    Polycythemia vera 06/04/2016    Restless leg syndrome 05/20/2015    Acute pain of left hip 02/03/2015    Back pain 01/13/2015    UTI (lower urinary tract infection) 01/13/2015    HLD (hyperlipidemia) 08/19/2014    S/P CABG (coronary artery bypass graft) 08/14/2013    HTN (hypertension) 08/14/2013        Plan:     Pneumonia  - CXR with interstitial pattern  - patient with indolent respiratory course  - likely atypical pneumonia with procal 1.11  - covering with  rocephin/azithro    SAI  Cr 1.6 w/ baseline 0.9. UA w/ negative LE and nitrite, 3 WBC, +hyaline casts  - Suspect prerenal, will hydrate gently given h/o HFpEF  - Procal 1.11  - FeUrea 7.6% consistent with prerenal  - UCx NGTD  - 1L NS this morning and monitor renal function for improvement     Leukocytosis  CXR w/ no focal consolidation. UA w/ no e/o UTI. Diffuse abd pain and epigastric TTP, bili elevated.  - RUQ ultrasound with cholelithiasis, no gallbladder thickening  - rocephin and azithromycin as above    Hyponatremia  Na 128. Hypovolemic by history and exam. S/p 500ml NS bolus in ED.   - LR infusion at 100ml/hr  - Follow daily BMPs     RAZO w/ HFpEF  SOB on exertion worse than baseline x 2-3 weeks. CXR w/ no pulm edema, improved from prior study. BNP 400s, baseline. EKG w/ afib, rate controlled, no acute ischemic changes. Last echo w/ nml EF, no comment on DD but PASP WNL 39.  - Monitor for s/s volume overload  - holding lasix for above SAI     Hypoalbuminemia  2.9.   - Follow CMP, encourage nutrition     Afib on anticoagulation  On Xarelto. Rate controlled.  - Con't Xarelto, BB     HTN  Controlled. Con't home antihypertensives.     Polycythemia Vera  Con't home hydroxyurea     Normocytic anemia  Iron studies suggest WILTON.  - Iron supplementation on discharge     CAD s/p PCI x 3 and CABG 3v  No CP. Tn 0.022. EKG w/ no acute ischemic changes.     HLD  Con't home statin.     Diet: Cardiac  Prophylaxis: heparin  Code: Full    Dispo: pending clinical improvement and resolution of SAI     Bradley Saravia MD HO-1  U Internal Medicine Team B    [facility] Medicine Hospitalist Pager numbers:   [facility] Hospitalist Medicine Team A (Elena/Riri): [phone]  [facility] Hospitalist Medicine Team B (Angelo/Bradley):  [phone]

## 2017-06-01 NOTE — ASSESSMENT & PLAN NOTE
The peripheral smear shows cells concerning for a primary hematological process such as myelofibrosis.  The blood counts are however stable at this point.    On further discussion with the patient and her 2 daughters in the room, she sees  - hematology oncology near [facility].  I suggested she follow-up with him as she has been doing.  Will not initiate further workup at this time given the fact that she already has an established hematologist.

## 2017-06-02 LAB
ALBUMIN SERPL BCP-MCNC: 2.6 G/DL
ALP SERPL-CCNC: 94 U/L
ALT SERPL W/O P-5'-P-CCNC: 21 U/L
ANION GAP SERPL CALC-SCNC: 10 MMOL/L
ANISOCYTOSIS BLD QL SMEAR: ABNORMAL
AST SERPL-CCNC: 29 U/L
BASOPHILS # BLD AUTO: ABNORMAL K/UL
BASOPHILS NFR BLD: 0 %
BILIRUB SERPL-MCNC: 1 MG/DL
BLASTS NFR BLD MANUAL: 1 %
BUN SERPL-MCNC: 39 MG/DL
BURR CELLS BLD QL SMEAR: ABNORMAL
CALCIUM SERPL-MCNC: 7.7 MG/DL
CHLORIDE SERPL-SCNC: 100 MMOL/L
CO2 SERPL-SCNC: 18 MMOL/L
CREAT SERPL-MCNC: 1.7 MG/DL
DACRYOCYTES BLD QL SMEAR: ABNORMAL
DIFFERENTIAL METHOD: ABNORMAL
EOSINOPHIL # BLD AUTO: ABNORMAL K/UL
EOSINOPHIL NFR BLD: 0 %
ERYTHROCYTE [DISTWIDTH] IN BLOOD BY AUTOMATED COUNT: 20.1 %
EST. GFR  (AFRICAN AMERICAN): 31 ML/MIN/1.73 M^2
EST. GFR  (NON AFRICAN AMERICAN): 27 ML/MIN/1.73 M^2
GIANT PLATELETS BLD QL SMEAR: PRESENT
GLUCOSE SERPL-MCNC: 202 MG/DL
HCT VFR BLD AUTO: 34.4 %
HGB BLD-MCNC: 10.4 G/DL
HYPOCHROMIA BLD QL SMEAR: ABNORMAL
L PNEUMO AG UR QL IA: NOT DETECTED
LYMPHOCYTES # BLD AUTO: ABNORMAL K/UL
LYMPHOCYTES NFR BLD: 6 %
MCH RBC QN AUTO: 23.2 PG
MCHC RBC AUTO-ENTMCNC: 30.2 %
MCV RBC AUTO: 77 FL
MONOCYTES # BLD AUTO: ABNORMAL K/UL
MONOCYTES NFR BLD: 4 %
NEUTROPHILS NFR BLD: 81 %
NEUTS BAND NFR BLD MANUAL: 7 %
NRBC BLD-RTO: ABNORMAL /100 WBC
OVALOCYTES BLD QL SMEAR: ABNORMAL
PLATELET # BLD AUTO: 199 K/UL
PLATELET BLD QL SMEAR: ABNORMAL
PMV BLD AUTO: ABNORMAL FL
POIKILOCYTOSIS BLD QL SMEAR: ABNORMAL
POLYCHROMASIA BLD QL SMEAR: ABNORMAL
POTASSIUM SERPL-SCNC: 3.3 MMOL/L
PROMYELOCYTES NFR BLD MANUAL: 1 %
PROT SERPL-MCNC: 5.3 G/DL
RBC # BLD AUTO: 4.49 M/UL
SCHISTOCYTES BLD QL SMEAR: ABNORMAL
SODIUM SERPL-SCNC: 128 MMOL/L
WBC # BLD AUTO: 22.52 K/UL

## 2017-06-02 PROCEDURE — 85027 COMPLETE CBC AUTOMATED: CPT

## 2017-06-02 PROCEDURE — 36415 COLL VENOUS BLD VENIPUNCTURE: CPT

## 2017-06-02 PROCEDURE — 97110 THERAPEUTIC EXERCISES: CPT

## 2017-06-02 PROCEDURE — 97535 SELF CARE MNGMENT TRAINING: CPT

## 2017-06-02 PROCEDURE — 25000003 PHARM REV CODE 250: Performed by: STUDENT IN AN ORGANIZED HEALTH CARE EDUCATION/TRAINING PROGRAM

## 2017-06-02 PROCEDURE — 94761 N-INVAS EAR/PLS OXIMETRY MLT: CPT

## 2017-06-02 PROCEDURE — 92526 ORAL FUNCTION THERAPY: CPT

## 2017-06-02 PROCEDURE — 25000003 PHARM REV CODE 250: Performed by: HOSPITALIST

## 2017-06-02 PROCEDURE — 63600175 PHARM REV CODE 636 W HCPCS: Performed by: HOSPITALIST

## 2017-06-02 PROCEDURE — 25000242 PHARM REV CODE 250 ALT 637 W/ HCPCS: Performed by: STUDENT IN AN ORGANIZED HEALTH CARE EDUCATION/TRAINING PROGRAM

## 2017-06-02 PROCEDURE — 85007 BL SMEAR W/DIFF WBC COUNT: CPT

## 2017-06-02 PROCEDURE — 21400001 HC TELEMETRY ROOM

## 2017-06-02 PROCEDURE — 25000003 PHARM REV CODE 250: Performed by: INTERNAL MEDICINE

## 2017-06-02 PROCEDURE — 97116 GAIT TRAINING THERAPY: CPT

## 2017-06-02 PROCEDURE — 87209 SMEAR COMPLEX STAIN: CPT

## 2017-06-02 PROCEDURE — 63600175 PHARM REV CODE 636 W HCPCS: Performed by: STUDENT IN AN ORGANIZED HEALTH CARE EDUCATION/TRAINING PROGRAM

## 2017-06-02 PROCEDURE — 80053 COMPREHEN METABOLIC PANEL: CPT

## 2017-06-02 PROCEDURE — 94640 AIRWAY INHALATION TREATMENT: CPT

## 2017-06-02 RX ORDER — FUROSEMIDE 10 MG/ML
60 INJECTION INTRAMUSCULAR; INTRAVENOUS ONCE
Status: COMPLETED | OUTPATIENT
Start: 2017-06-02 | End: 2017-06-02

## 2017-06-02 RX ORDER — BENZONATATE 100 MG/1
100 CAPSULE ORAL NIGHTLY PRN
Status: DISCONTINUED | OUTPATIENT
Start: 2017-06-02 | End: 2017-06-03 | Stop reason: HOSPADM

## 2017-06-02 RX ORDER — HYDROCODONE BITARTRATE AND ACETAMINOPHEN 7.5; 325 MG/1; MG/1
1 TABLET ORAL 2 TIMES DAILY PRN
Status: DISCONTINUED | OUTPATIENT
Start: 2017-06-02 | End: 2017-06-03 | Stop reason: HOSPADM

## 2017-06-02 RX ADMIN — MOXIFLOXACIN HYDROCHLORIDE 400 MG: 400 INJECTION, SOLUTION INTRAVENOUS at 11:06

## 2017-06-02 RX ADMIN — ROSUVASTATIN CALCIUM 10 MG: 10 TABLET ORAL at 08:06

## 2017-06-02 RX ADMIN — FAMOTIDINE 20 MG: 20 TABLET, FILM COATED ORAL at 08:06

## 2017-06-02 RX ADMIN — LACTOBACILLUS TAB 4 TABLET: TAB at 05:06

## 2017-06-02 RX ADMIN — CARVEDILOL 6.25 MG: 6.25 TABLET, FILM COATED ORAL at 05:06

## 2017-06-02 RX ADMIN — RIVAROXABAN 20 MG: 20 TABLET, FILM COATED ORAL at 08:06

## 2017-06-02 RX ADMIN — NICOTINE 1 PATCH: 21 PATCH, EXTENDED RELEASE TRANSDERMAL at 08:06

## 2017-06-02 RX ADMIN — IPRATROPIUM BROMIDE AND ALBUTEROL SULFATE 3 ML: .5; 3 SOLUTION RESPIRATORY (INHALATION) at 08:06

## 2017-06-02 RX ADMIN — FUROSEMIDE 60 MG: 10 INJECTION, SOLUTION INTRAMUSCULAR; INTRAVENOUS at 11:06

## 2017-06-02 RX ADMIN — IPRATROPIUM BROMIDE AND ALBUTEROL SULFATE 3 ML: .5; 3 SOLUTION RESPIRATORY (INHALATION) at 12:06

## 2017-06-02 RX ADMIN — LACTOBACILLUS TAB 4 TABLET: TAB at 11:06

## 2017-06-02 RX ADMIN — CARVEDILOL 6.25 MG: 6.25 TABLET, FILM COATED ORAL at 08:06

## 2017-06-02 RX ADMIN — PREDNISONE 50 MG: 10 TABLET ORAL at 08:06

## 2017-06-02 RX ADMIN — POTASSIUM CHLORIDE 25 MEQ: 1.88 POWDER, FOR SOLUTION ORAL at 11:06

## 2017-06-02 RX ADMIN — LACTOBACILLUS TAB 4 TABLET: TAB at 08:06

## 2017-06-02 RX ADMIN — POTASSIUM CHLORIDE 25 MEQ: 1.88 POWDER, FOR SOLUTION ORAL at 08:06

## 2017-06-02 RX ADMIN — IPRATROPIUM BROMIDE AND ALBUTEROL SULFATE 3 ML: .5; 3 SOLUTION RESPIRATORY (INHALATION) at 07:06

## 2017-06-02 RX ADMIN — HYDROCODONE BITARTRATE AND ACETAMINOPHEN 1 TABLET: 7.5; 325 TABLET ORAL at 08:06

## 2017-06-02 RX ADMIN — HYDROXYUREA 500 MG: 500 CAPSULE ORAL at 08:06

## 2017-06-02 RX ADMIN — HYDROCODONE BITARTRATE AND ACETAMINOPHEN 1 TABLET: 7.5; 325 TABLET ORAL at 01:06

## 2017-06-02 NOTE — MEDICAL/APP STUDENT
[facility] Medicine Medical Student Progress Note      Subjective:   Mrs. Rincon is an 87 year old female being followed by our service for aspiration PNA and SAI. Overnight she reports episodic exertional dyspnea. When trying to use the restroom, she desaturated to upper 80s per nursing note. Night MD was called who noted LE edema placed the patient on 2L NC O2 and withheld fluids. No lasix were given. O2 improved overnight. This morning she feels much better on room air. Still c/o cough and SOB but believes her SOB has improved since admission. Also reports ~3 episodes of nonbloody diarrhea associated with mild abdominal pain. No nausea or vomiting.         Objective:      Tmax: 99.2  Pulse:   RR: 15-20  BP: (112-146)/(58-87)  SpO2:      I/O:   In - 2852.1   Out - 475 urine (0.4ml/kg/hr) - 0.3 yesterday     Physical Exam:      General: Resting comfortably in bed  HENT: NCAT, neck supple, dry mucous membranes  Cardio: regular rate, irregularly irregular rhythm  Resp: Rales bilaterally with expiratory wheezes at the bases  Abdomen: mildy tender in the RUQ, distended, no guarding or rebound. +BS  Extremities: 2+ edema to midshins bilaterally  Pulses: 2+ and symmetric distally  Neuro:  Awake and alert, cooperative with exam    Most Recent Data:  CBC: Lab Results   Component Value Date    WBC 22.52 (H) 06/02/2017    HGB 10.4 (L) 06/02/2017    HCT 34.4 (L) 06/02/2017     06/02/2017    MCV 77 (L) 06/02/2017    RDW 20.1 (H) 06/02/2017     WBC Differential: 81 % N, 7 % Bands, 6 % L, 4 % M, 0 % Eo, 0 % Baso, tear drop cells, tasha cells, large/giant platelets seen  BMP: Lab Results   Component Value Date     (L) 06/02/2017    K 3.3 (L) 06/02/2017     06/02/2017    CO2 18 (L) 06/02/2017    BUN 39 (H) 06/02/2017    CREATININE 1.7 (H) 06/02/2017     (H) 06/02/2017    CALCIUM 7.7 (L) 06/02/2017    MG 2.4 03/02/2017    PHOS 3.8 03/02/2017     LFTs: Lab Results   Component Value Date    PROT  5.3 (L) 06/02/2017    ALBUMIN 2.6 (L) 06/02/2017    BILITOT 1.0 06/02/2017    AST 29 06/02/2017    ALKPHOS 94 06/02/2017    ALT 21 06/02/2017     Coags:   Lab Results   Component Value Date    INR 1.1 08/18/2016     FLP: Lab Results   Component Value Date    CHOL 82 (L) 03/02/2017    HDL 40 03/02/2017    LDLCALC 27.8 (L) 03/02/2017    TRIG 71 03/02/2017    CHOLHDL 48.8 03/02/2017     DM: Lab Results   Component Value Date    HGBA1C 5.6 03/02/2017    HGBA1C 5.8 06/02/2016    LDLCALC 27.8 (L) 03/02/2017    CREATININE 1.7 (H) 06/02/2017     Thyroid: Lab Results   Component Value Date    TSH 1.766 05/29/2017     Anemia: Lab Results   Component Value Date    IRON 28 (L) 03/02/2017    TIBC 460 (H) 03/02/2017    FERRITIN 93 03/02/2017    JTKEYQRY72 855 03/02/2017    FOLATE 8.9 03/02/2017     Cardiac: Lab Results   Component Value Date    TROPONINI 0.022 05/29/2017     (H) 05/29/2017     Urinalysis: Lab Results   Component Value Date    LABURIN No significant growth 05/29/2017    COLORU Cit[place] (A) 05/29/2017    SPECGRAV 1.025 05/29/2017    NITRITE Negative 05/29/2017    PROTEINUR 30 01/13/2015    KETONESU Trace (A) 05/29/2017    UROBILINOGEN 2.0-3.0 (A) 05/29/2017    BILIRUBINUR negative 01/13/2015    WBCUA 3 05/29/2017       Trended Lab Data:    Recent Labs  Lab 05/31/17  0329 06/01/17  0303 06/01/17  0304 06/02/17  0338   WBC 24.10*  --  23.20* 22.52*   HGB 10.4*  --  10.5* 10.4*   HCT 35.0*  --  34.3* 34.4*     --  227 199   MCV 79*  --  77* 77*   RDW 19.8*  --  20.0* 20.1*   * 128*  --  128*   K 3.6 3.6  --  3.3*    100  --  100   CO2 16* 15*  --  18*   BUN 28* 38*  --  39*   CREATININE 1.8* 2.0*  --  1.7*   * 148*  --  202*   PROT 5.2* 5.5*  --  5.3*   ALBUMIN 2.6* 2.8*  --  2.6*   BILITOT 1.3* 0.9  --  1.0   AST 26 33  --  29   ALKPHOS 87 89  --  94   ALT 17 18  --  21       Trended Cardiac Data:    Recent Labs  Lab 05/29/17  1643   TROPONINI 0.022   *       Microbiology  Data:  6/1 - Stool cx pending  6/1 - C.diff negative   5/29 - Blood cx  NGTDx4  5/29 - Urine cx NGTDx4    Radiology:  Imaging Results          Fl Modified Barium Swallow Speech (Final result)  Result time 06/01/17 11:14:05    Final result by Erin Ardon MD (06/01/17 11:14:05)                 Impression:      No evidence of laryngeal penetration or tracheal aspiration.  Please see speech pathology report for further details.      Electronically signed by: ERIN ARDON MD  Date:     06/01/17  Time:    11:14              Narrative:    Clinical indication: 87-year-old female with possible aspiration.    Comparison: None.    Technique: Video fluoroscopic swallowing examination was performed in conjunction with the speech pathology department.  Various liquid and solid food substances were used to assess swallowing.    Findings: Normal oral transit was observed. Both regular and thin barium, as well as semisolid and solid food substances were swallowed without difficulty.  No laryngeal penetration or tracheal aspiration was observed.  Please see speech pathology report for further details.    Total fluoroscopic time was 2 minutes 2 seconds.                             NM Lung Ventilation Perfusion Imaging (Final result)  Result time 05/31/17 13:38:17    Final result by Rafal Watkins MD (05/31/17 13:38:17)                 Impression:     Low probability V/Q study.      Electronically signed by: RAFAL WATKINS MD  Date:     05/31/17  Time:    13:38              Narrative:    Nuclear medicine ventilation and perfusion imaging.    Findings: 5 mCi of technetium labeled MAA was injected for the perfusion portion of today's examination and 15 mCi of xenon-133 gas was used for the ventilation portion. This examination is interpreted in correlation with chest radiograph performed on same date. There is no significant ventilation perfusion mismatch.                             X-Ray Chest 1 View (Final result)  Result time  05/31/17 13:03:19    Final result by Roxann Schulte MD (05/31/17 13:03:19)                 Impression:    No significant change from the prior study .      Electronically signed by: ROXANN SCHULTE MD  Date:     05/31/17  Time:    13:03              Narrative:    AP chest radiograph.      Comparison: 5/29/17    Results: Sternotomy wires.  The cardiac silhouette is enlarged.  Mild increased interstitial lung markings.  No focal airspace disease.  No pleural effusion.  No pneumothorax.  The osseous structures appear normal for age.                             US Abdomen Limited (Final result)  Result time 05/30/17 10:39:45    Final result by Leeroy Kline MD (05/30/17 10:39:45)                 Impression:        Cholelithiasis.  Negative sonographic Zavala's sign.  No pericholecystic fluid or biliary duct dilatation.      Electronically signed by: Leeroy Kline MD  Date:     05/30/17  Time:    10:39              Narrative:    Comparison: 3/16/17.    Results: Limited right upper quadrant ultrasound performed. The visualized portions of the pancreas, abdominal aorta, and IVC are unremarkable.   One gallstone identified.  Negative sonographic Zavala's sign.  No gallbladder wall thickening.  No para cholecystic fluid. The common bile duct is within normal limits at 4 mm. The liver measures 15.5 cm in length and demonstrates homogeneous echogenicity. The right kidney is normal in length measuring 11.8cm. No right sided hydronephrosis or renal masses identified. Renal cortical thinning noted.                             X-Ray Chest PA And Lateral (Final result)  Result time 05/29/17 18:04:29    Final result by Ron Rea MD (05/29/17 18:04:29)                 Impression:       No acute intrathoracic process.          Electronically signed by: RON REA MD  Date:     05/29/17  Time:    18:04              Narrative:    [id number]  Accession # [id number]      Study:  XR CHEST PA AND LATERAL    Indication:  COUGH.    Comparison: Chest radiograph from 03/01/2017.    Findings:     XR CHEST PA AND LATERAL.    No significant change when compared to prior exam.    Postsurgical changes status post sternotomy.  No cardiomegaly.  Calcification and tortuosity of the thoracic aorta.  Normal pulmonary vasculature.  Lungs are clear.  Trace bilateral pleural effusions.  Degenerative changes of the thoracic spine.                            5/30 - 2D Echo Impressions     1 - Biatrial enlargement.     2 - Concentric remodeling.     3 - No wall motion abnormalities.     4 - Normal left ventricular systolic function (EF 55-60%).     5 - Normal left ventricular diastolic function.     6 - Normal right ventricular systolic function .     7 - The estimated PA systolic pressure is 37 mmHg.     8 - Mild aortic regurgitation.     9 - Moderate tricuspid regurgitation.     10 - Increased central venous pressure.    Scheduled Meds:   albuterol-ipratropium 2.5mg-0.5mg/3mL  3 mL Nebulization Q6H WAKE    carvedilol  6.25 mg Oral BID WM    famotidine  20 mg Oral Daily    hydroxyurea  500 mg Oral Daily    Lactobacillus acidoph-L.bulgar  4 tablet Oral TID WM    moxifloxacin  400 mg Intravenous Q24H    nicotine  1 patch Transdermal Daily    potassium chloride  25 mEq Oral Q2H    predniSONE  50 mg Oral Daily    rivaroxaban  20 mg Oral Daily    rosuvastatin  10 mg Oral Daily     Continuous Infusions:   PRN Meds:.dextrose 50%, dextrose 50%, glucagon (human recombinant), glucose, glucose, hydrocodone-acetaminophen 7.5-325mg      Assessment:     Mrs. Rincon is an 87 year old female here for SAI and PNA with:      Patient Active Problem List   Diagnosis    S/P CABG (coronary artery bypass graft)    HTN (hypertension)    HLD (hyperlipidemia)    Back pain    UTI (lower urinary tract infection)    Acute pain of left hip    Restless leg syndrome    Peripheral arterial disease    Atrial fibrillation, controlled    Polycythemia vera     Chronic alcoholism in remission    Chronic diarrhea    Chronic insomnia    Lumbar radiculopathy    Peripheral polyneuropathy    Rheumatoid factor positive with cyclic citrullinated peptide (CCP) antibody negative    Coagulation defect    Shaking spells    Chronic diastolic heart failure    Leukocytosis    Thrombocytosis    Normocytic anemia    Chewing tobacco nicotine dependence    Bilateral lower leg cellulitis    Nephrolithiasis    Acute cystitis without hematuria    Non-rheumatic tricuspid valve insufficiency    Narcotic dependence    SAI (acute kidney injury)    Dehydration    Hyponatremia    Metabolic acidosis     Plan:     1. Myelofibrosis   - Per heme/onc, peripheral smear shows cells concerning for a primary hematological process such as myelofibrosis.Blood counts are stable and patient can follow up with her outpatient hematoligst at [facility].    - Likely source of leukocytosis in addition to her prednisone  2. Acute Hypoxic Respiratory Failure   -Most likely exacerbation of heart failure 2/2 withholding lasix for SAI. 2+ edema to mid shins today. SOB improved this morning, VSS. Will consider lasix dose today.   - Also considering underlying pulm htn. Given h/o HFpEF, PA systolic pressure on echo done 5/29 at 37, now with dx of myelofibrosis. Will consult with staff.   3. Aspiration Pneumonia  - CXR with interstitial pattern  - patient with indolent respiratory course  - likely atypical pneumonia with procal 1.11  - covering with moxi  - swallow study done yesterday showed no concerning signs of aspiration  3. SAI  -Likely prerenal 2/2 dehydration. Recurrent bouts of diarrhea.   - Cr 1.7, down from 2 yesterday. Continue to hydrate gently with h/o HFpEF.   4. Hypovolemic Hyponatremia   - Na 128 today, no change since yesterday   - Will continue to replace gently given h/o heart failure  - CMP daily  5. Decreased albumin  - encourage nutriotion   6. Atrial fibrillation   -  rate controlled, on Xarelto  7. HTN  - controlled on home meds  8. Polycythemia vera  - continue hydroxyurea, f/u with outpatient heme at [facility]  9. Microcytic anemia  - iron supplementation on disharge      Diet: Cardiac  Prophylaxis: heparin  Code: Full     Dispo: pending clinical improvement and resolution of SAI

## 2017-06-02 NOTE — PLAN OF CARE
Problem: Physical Therapy Goal  Goal: Physical Therapy Goal  Goals to be met by: 2017     Patient will increase functional independence with mobility by performin. Supine to sit with Modified [place]  2. Sit to stand transfer with Modified [place]  3. Gait  x 150 feet with Stand-by Assistance using Rolling Walker.      Outcome: Ongoing (interventions implemented as appropriate)  Goals ongoing

## 2017-06-02 NOTE — PT/OT/SLP PROGRESS
Speech Language Pathology  Dysphagia Treatment/Discharge    Angel Rincon   MRN: [id number]   Admitting Diagnosis: <principal problem not specified>    Diet recommendations: Solid Diet Level: Dental Soft  Liquid Diet Level: Thin Feed only when awake/alert, HOB to 90 degrees, Small bites/sips, Alternating bites/sips, Remain upright 30 minutes post meal and Meds crushed in puree    SLP Treatment Date: 17  Speech Start Time: 1033     Speech Stop Time: 1045     Speech Total (min): 12 min       TREATMENT BILLABLE MINUTES:  Treatment Swallowing Dysfunction 22    Has the patient been evaluated by SLP for swallowing? : Yes  Keep patient NPO?: No   General Precautions: Standard, fall, hearing impaired          Subjective:  Pt found in room, daughters at bedside.    Pain/Comfort  Pain Rating 1: 0/10    Objective:   Patient found with: telemetry  Pt alert and cooperative, pt reported no pain.   Pt seen with orange juice and cracker trials at bedside. Pt w/ adequate oral phase of the swallow, no coughing with liquids or change in voice. Pt with fair laryngeal lift noted on liquid swallows by cup and straw.  Pt with slow mastication of hard solid, pt continues to warrant soft solid due to edentulous status.   Continue soft diet for now, boost breeze requested by family to increase caloric needs.     FIM:  Social Interaction: 6 Complete [place]--The patient interacts appropriately with staff, other patients, and family members (e.g., controls temper, accepts criticism, is aware that words and actions have an impact on others), and does not require medication for   Problem Solvin Modified [place]--In most situations, the patient recognizes a present problem, and with only mild difficulty makes appropriate decisions, initiates and carries out a sequence of steps to solve complex problems, or requires more than a   Comprehension: 5 Standby Prompting--The patient understands directions and conversation about basic  daily needs more than 90% of the time.  The patient requires prompting (slowed speech rate, use of repetition, stressing particular words or phrases, pauses, visual or                Assessment:  Angel Rincon is a 87 y.o. female with a medical diagnosis of <principal problem not specified> and presents with swallow function appears to be at  Baseline.     Discharge recommendations: Discharge Facility/Level Of Care Needs:  (no further ST needs)     Goals:    SLP Goals     Not on file          Multidisciplinary Problems (Resolved)        Problem: SLP Goal    Goal Priority Disciplines Outcome   SLP Goal   (Resolved)     SLP Outcome(s) achieved   Description:  Short Term Goals:   1. Pt will participate in a Modified Barium Swallow Study to objectively r/o aspiration and determine the least restrictive diet with updated goals to follow pending results.   MET 6/2                    Plan:   Patient to be seen    Plan of Care expires: 06/29/17  Plan of Care reviewed with: patient, daughter, caregiver  SLP Follow-up?: No              HARSHAD Hudson, CCC-SLP  06/02/2017

## 2017-06-02 NOTE — PT/OT/SLP PROGRESS
Occupational Therapy  Treatment    Angel Rincon   MRN: [id number]   Admitting Diagnosis: <principal problem not specified>    OT Date of Treatment: 06/02/17   OT Start Time: 1055  OT Stop Time: 1135  OT Total Time (min): 40 min    Billable Minutes:  Self Care/Home Management 15 and Therapeutic Exercise 25    General Precautions: Standard, fall, hearing impaired  Orthopedic Precautions: N/A  Braces: N/A    Subjective:  Communicated with nurseDeepti prior to session.    Pain/Comfort  Pain Rating 1: 0/10  Pain Rating Post-Intervention 1: 0/10    Objective:  Patient found with: telemetry, peripheral IV     Functional Mobility:  Bed Mobility:  Scooting/Bridging: Supervision  Supine to Sit: Supervision    Transfers:   Sit <> Stand Assistance: Supervision  Sit <> Stand Assistive Device: No Assistive Device  Bed <> Chair Transfer Assistance: Supervision  Bed <> Chair Assistive Device: No Assistive Device    Functional Ambulation: N/A    Activities of Daily Living:  LE Dressing Level of Assistance: Maximum assistance  Grooming Position: Seated, bedside chair  Grooming Level of Assistance: Supervision    Balance:   Static Sit: NORMAL: No deviations seen in posture held statically  Dynamic Sit: GOOD-: Maintains balance through MODERATE excursions of active trunk movement,     Static Stand: GOOD-: Takes MODERATE challenges from all directions inconsistently  Dynamic stand: GOOD-: Needs SUPERVISION only during gait and able to self right with moderate     Therapeutic Activities and Exercises:  Bed mob as noted above  Ambulated to bedside chair without AD and Sup  Completed G/H tasks in bedside chair with s/u  Completed BUE AROM therex, 2 x 10 reps, all avail joints and planes, with rest breaks between exercises  Instructed on pursed lip breathing  BLEs elevated and socks doffed 2* increased edema noted - instructed to perform ankle pumps throughout day to reduce edema    AM-PAC 6 CLICK ADL   How much help from another person  "does this patient currently need?   1 = Unable, Total/Dependent Assistance  2 = A lot, Maximum/Moderate Assistance  3 = A little, Minimum/Contact Guard/Supervision  4 = None, Modified [place]/Independent    Putting on and taking off regular lower body clothing? : 3  Bathing (including washing, rinsing, drying)?: 3  Toileting, which includes using toilet, bedpan, or urinal? : 3  Putting on and taking off regular upper body clothing?: 3  Taking care of personal grooming such as brushing teeth?: 4  Eating meals?: 4  Total Score: 20     AM-PAC Raw Score CMS "G-Code Modifier Level of Impairment Assistance   6 % Total / Unable   7 - 8 CM 80 - 100% Maximal Assist   9-13 CL 60 - 80% Moderate Assist   14 - 19 CK 40 - 60% Moderate Assist   20 - 22 CJ 20 - 40% Minimal Assist   23 CI 1-20% SBA / CGA   24 CH 0% Independent/ Mod I       Patient left up in chair with all lines intact, call button in reach, RN notified and family members present    ASSESSMENT:  Angel Rincon is a 87 y.o. female with a medical diagnosis of <principal problem not specified>   Patient making good progress. Edema noted in BLEs this date. Patient will benefit from HH OT/PT.     Rehab identified problem list/impairments: Rehab identified problem list/impairments: weakness, impaired endurance, impaired functional mobilty, gait instability, impaired balance, edema    Rehab potential is excellent.    Activity tolerance: Good    Discharge recommendations: Discharge Facility/Level Of Care Needs: home health OT, home health PT     Barriers to discharge: Barriers to Discharge: Inaccessible home environment    Equipment recommendations: none     GOALS:    Occupational Therapy Goals        Problem: Occupational Therapy Goal    Goal Priority Disciplines Outcome Interventions   Occupational Therapy Goal     OT, PT/OT Ongoing (interventions implemented as appropriate)    Description:  Goals to be met by: 6/30     Patient will increase functional " independence with ADLs by performing:    UE Dressing with Modified [place].  LE Dressing with Modified [place].  Grooming while standing with Modified [place].  Toileting from toilet with Modified [place] for hygiene and clothing management.   Toilet transfer to toilet with Modified [place].  Upper extremity exercise program x10 reps per handout, with independence.                      Plan:  Patient to be seen 5 x/week to address the above listed problems via self-care/home management, therapeutic activities, therapeutic exercises  Plan of Care expires: 06/30/17  Plan of Care reviewed with: patient, family         ROLA Bonner  06/02/2017

## 2017-06-02 NOTE — PROGRESS NOTES
[facility] IM Resident [facility] Progress Note    Subjective:      Angel Rincon is a 87 y.o. female who is being followed by the [facility] IM service at [facility] for pneumonia and SAI.    Episode of SOB overnight. Fluids stopped as documented. This morning patient feeling about the same as yesterday. Same degree of SOB. States that she urinated 2-3 times overnight. No diarrhea overnight.    Denies any chest pain.      Objective:   Last 24 Hour Vital Signs:  BP  Min: 112/63  Max: 146/87  Temp  Av.2 °F (36.8 °C)  Min: 97.7 °F (36.5 °C)  Max: 99.2 °F (37.3 °C)  Pulse  Av.3  Min: 82  Max: 130  Resp  Av.1  Min: 16  Max: 20  SpO2  Av %  Min: 95 %  Max: 100 %  I/O last 3 completed shifts:  In: 2952.1 [P.O.:975; I.V.:1727.1; IV Piggyback:250]  Out: 475 [Urine:475]    Physical Examination:  Vitals:    17 0443   BP: 133/82   Pulse: (!) 121   Resp: 20   Temp: 98 °F (36.7 °C)     Gen: NAD, sitting in chair comfortably  HENT: NCAT, EOMI, MMM  CV: RRR, mild diastolic murmur best heard in lower R sternal border  Pulm: bibasilar crackles  Abd: normoactive, mild diffuse ttp, nondistended  Extremities: mild bilateral lower extremity pitting edema, 2+ pulses in all extremities  Skin: varicose veins to bilateral lower extremities  Neuro: moving all extremities well, gross sensation to light touch preserved, nonfocal  Psych: AAOx4, calm, cooperative, conversational      Laboratory:  Laboratory Data Reviewed: yes  Pertinent Findings:  WBC 20.7 -> 18.7 -> 24.10 -> 23.2 ->22.5  Bands 0 -> 5 -> 2 ->2  Na 128 -> 129 -> 128-> 128  HCO3 15 -> 17 -> 16 ->15  Cr 1.6 -> 1.5 -> 1.8 ->2.0 ->1.7    Microbiology Data Reviewed: yes  Pertinent Findings:   Blood Cx NGTD   Urine Cx NGTD    Other Results:  EKG (my interpretation): none new    Radiology Data Reviewed: yes  Pertinent Findings:  none    Current Medications:     Infusions:        Scheduled:   albuterol-ipratropium 2.5mg-0.5mg/3mL  3 mL Nebulization Q6H  WAKE    carvedilol  6.25 mg Oral BID WM    famotidine  20 mg Oral Daily    hydroxyurea  500 mg Oral Daily    Lactobacillus acidoph-L.bulgar  4 tablet Oral TID WM    moxifloxacin  400 mg Intravenous Q24H    nicotine  1 patch Transdermal Daily    predniSONE  50 mg Oral Daily    rivaroxaban  20 mg Oral Daily    rosuvastatin  10 mg Oral Daily        PRN:  dextrose 50%, dextrose 50%, glucagon (human recombinant), glucose, glucose, hydrocodone-acetaminophen 7.5-325mg    Antibiotics and Day Number of Therapy:  Rocephin, Azithro 5/30-current    Lines and Day Number of Therapy:  PIV    Assessment:     Angel Rincon is a 87 y.o.female with  Patient Active Problem List    Diagnosis Date Noted    Dehydration     Hyponatremia     Metabolic acidosis     SAI (acute kidney injury) 05/29/2017    Narcotic dependence 04/21/2017    Nephrolithiasis 03/09/2017    Acute cystitis without hematuria 03/09/2017    Non-rheumatic tricuspid valve insufficiency 03/09/2017    Chronic diastolic heart failure 03/02/2017    Leukocytosis 03/02/2017    Thrombocytosis 03/02/2017    Normocytic anemia 03/02/2017    Chewing tobacco nicotine dependence 03/02/2017    Bilateral lower leg cellulitis 03/02/2017    Shaking spells     Coagulation defect 12/12/2016    Rheumatoid factor positive with cyclic citrullinated peptide (CCP) antibody negative 08/18/2016    Lumbar radiculopathy 07/18/2016    Peripheral polyneuropathy 07/18/2016    Chronic insomnia 07/07/2016    Chronic alcoholism in remission 06/14/2016    Chronic diarrhea 06/14/2016    Peripheral arterial disease 06/04/2016    Atrial fibrillation, controlled 06/04/2016    Polycythemia vera 06/04/2016    Restless leg syndrome 05/20/2015    Acute pain of left hip 02/03/2015    Back pain 01/13/2015    UTI (lower urinary tract infection) 01/13/2015    HLD (hyperlipidemia) 08/19/2014    S/P CABG (coronary artery bypass graft) 08/14/2013    HTN (hypertension) 08/14/2013         Plan:     Pneumonia  - CXR with interstitial pattern  - patient with indolent respiratory course  - likely atypical pneumonia with procal 1.11 and possible aspiration  modified barium with no evidence of aspiration or penetration noted across any consistencies  - on moxifloxacin     SAI  Cr 1.6 w/ baseline 0.9. UA w/ negative LE and nitrite, 3 WBC, +hyaline casts  - Suspect prerenal, will hydrate gently given h/o HFpEF  - Procal 1.11  - FeUrea 7.6% consistent with prerenal  - UCx NGTD  - Cr improved today to 1.7  SOB overnight, fluids stopped  will discuss lasix     Abnormal Peripheral Smear  - noted on 6/1  teardrop cells, blastoid cells and circulating nucleated red cells  concerning for myelofibrosis  - Heme/onc consulted  sees Dr. Vaca at   no further w/u initiated as patient regularly follows up  blood counts stable     Leukocytosis  CXR w/ no focal consolidation. UA w/ no e/o UTI. Diffuse abd pain and epigastric TTP, bili elevated.  - RUQ ultrasound with cholelithiasis, no gallbladder thickening  - moxi as above    Hyponatremia  Na 128. Hypovolemic by history and exam. S/p 500ml NS bolus in ED.   - likely hypotonic  IVF   - Follow daily BMPs     RAZO w/ HFpEF  SOB on exertion worse than baseline x 2-3 weeks. CXR w/ no pulm edema, improved from prior study. BNP 400s, baseline. EKG w/ afib, rate controlled, no acute ischemic changes. Last echo w/ nml EF, no comment on DD but PASP WNL 39.  - Monitor for s/s volume overload  - holding lasix for above SAI     Hypoalbuminemia  2.9.   - Follow CMP, encourage nutrition     Afib on anticoagulation  On Xarelto. Rate controlled.  - Con't Xarelto, BB     HTN  Controlled. Con't home antihypertensives.     Polycythemia Vera  Con't home hydroxyurea     Normocytic anemia  Iron studies suggest [place].  - Iron supplementation on discharge     CAD s/p PCI x 3 and CABG 3v  No CP. Tn 0.022. EKG w/ no acute ischemic changes.     HLD  Con't home statin.     Diet:  Cardiac  Prophylaxis: heparin  Code: Full    Dispo: pending clinical improvement of SOB and resolution of SAI     Bradley Saravia MD HO-1  [facility] Internal Medicine Team B    [facility] Medicine Hospitalist Pager numbers:   [facility] Hospitalist Medicine Team A (Elena/Riri): [phone]  [facility] Hospitalist Medicine Team B (Angelo/Bradley):  [phone]

## 2017-06-02 NOTE — PLAN OF CARE
Problem: Occupational Therapy Goal  Goal: Occupational Therapy Goal  Goals to be met by: 6/30     Patient will increase functional independence with ADLs by performing:    UE Dressing with Modified [place].  LE Dressing with Modified [place].  Grooming while standing with Modified [place].  Toileting from toilet with Modified [place] for hygiene and clothing management.   Toilet transfer to toilet with Modified [place].  Upper extremity exercise program x10 reps per handout, with independence.     Outcome: Ongoing (interventions implemented as appropriate)  Patient making good progress. Edema noted in BLEs this date. Patient will benefit from HH OT/PT.

## 2017-06-02 NOTE — PT/OT/SLP PROGRESS
Physical Therapy  Treatment    Angel Rincon   MRN: [id number]   Admitting Diagnosis: <principal problem not specified>    PT Received On: 06/02/17  PT Start Time: 0940     PT Stop Time: 1005    PT Total Time (min): 25 min       Billable Minutes:  Gait [id number] and Therapeutic Exercise 10    Treatment Type: Treatment  PT/PTA: PTA     PTA Visit Number: 1       General Precautions: Standard, fall, hearing impaired  Orthopedic Precautions: N/A   Braces:           Subjective:  Communicated with nsg prior to session.      Pain/Comfort  Pain Rating 1: 0/10    Objective:   Patient found with: telemetry    Functional Mobility:  Bed Mobility:   Supine to Sit: Supervision  Sit to Supine: Supervision    Transfers:  Sit <> Stand Assistance: Supervision  Sit <> Stand Assistive Device: Rolling Walker    Gait:   Gait Distance: ~60' X 2 with 1 standing rest break  Assistance 1: Stand by Assistance  Gait Assistive Device: Rolling walker  Gait Pattern: reciprocal  Gait Deviation(s): decreased diaz, decreased velocity of limb motion, decreased step length, decreased stride length    Stairs:      Balance:   Static Sit: GOOD: Takes MODERATE challenges from all directions  Dynamic Sit: FAIR+: Maintains balance through MINIMAL excursions of active trunk motion  Static Stand: FAIR+: Takes MINIMAL challenges from all directions  Dynamic stand: FAIR: Needs CONTACT GUARD during gait     Therapeutic Activities and Exercises: LE supine ex's including AP, QS, hip flexion, abd/add, SLR X 10 reps.       AM-PAC 6 CLICK MOBILITY  How much help from another person does this patient currently need?   1 = Unable, Total/Dependent Assistance  2 = A lot, Maximum/Moderate Assistance  3 = A little, Minimum/Contact Guard/Supervision  4 = None, Modified [place]/Independent    Turning over in bed (including adjusting bedclothes, sheets and blankets)?: 4  Sitting down on and standing up from a chair with arms (e.g., wheelchair, bedside commode, etc.):  3  Moving from lying on back to sitting on the side of the bed?: 4  Moving to and from a bed to a chair (including a wheelchair)?: 3  Need to walk in hospital room?: 3  Climbing 3-5 steps with a railing?: 3  Total Score: 20    AM-PAC Raw Score CMS G-Code Modifier Level of Impairment Assistance   6 % Total / Unable   7 - 9 CM 80 - 100% Maximal Assist   10 - 14 CL 60 - 80% Moderate Assist   15 - 19 CK 40 - 60% Moderate Assist   20 - 22 CJ 20 - 40% Minimal Assist   23 CI 1-20% SBA / CGA   24 CH 0% Independent/ Mod I     Patient left supine with all lines intact, call button in reach, nsg notified and family present.    Assessment: pt with improved gait endurance and mobility, increased v/c's due to decreased hearing.  Angel Rincon is a 87 y.o. female with a medical diagnosis of <principal problem not specified> and presents with.    Rehab identified problem list/impairments: Rehab identified problem list/impairments: weakness, impaired endurance, impaired functional mobilty, gait instability, impaired balance, decreased coordination, decreased lower extremity function    Rehab potential is good.    Activity tolerance: Fair    Discharge recommendations: Discharge Facility/Level Of Care Needs: home health PT     Barriers to discharge: Barriers to Discharge: Inaccessible home environment    Equipment recommendations: Equipment Needed After Discharge: none     GOALS: see general POC   Physical Therapy Goals        Problem: Physical Therapy Goal    Goal Priority Disciplines Outcome Goal Variances Interventions   Physical Therapy Goal     PT/OT, PT Ongoing (interventions implemented as appropriate)     Description:  Goals to be met by: 2017     Patient will increase functional independence with mobility by performin. Supine to sit with Modified [place]  2. Sit to stand transfer with Modified [place]  3. Gait  x 150 feet with Stand-by Assistance using Rolling Walker.                       PLAN:     Patient to be seen 6 x/week  to address the above listed problems via gait training, therapeutic activities, therapeutic exercises  Plan of Care expires: 06/30/17  Plan of Care reviewed with: patient, family         Damon Guerra, PTA  06/02/2017

## 2017-06-02 NOTE — PLAN OF CARE
Problem: SLP Goal  Goal: SLP Goal  Short Term Goals:   1. Pt will participate in a Modified Barium Swallow Study to objectively r/o aspiration and determine the least restrictive diet with updated goals to follow pending results.    Outcome: Outcome(s) achieved Date Met: 06/02/17  Pt tolerating soft diet and thin liquids, family requesting boost breeze with meals. Pt demonstrated no audible coughing or choking with po trials. NO further ST needs indicated.

## 2017-06-02 NOTE — PLAN OF CARE
Patient went into bathroom, reports SOB. 02 SATs at 88% placed 2lnc to patient. 02 SATs at 96%. Patient still c/o difficulty in breathing. Breath sounds clear. Exp wheezing noted. Notified Dr. Irvin for further evaluation.

## 2017-06-02 NOTE — PROGRESS NOTES
.Pharmacy New Medication Education    Patient accepted medication education.    Pharmacy educated patient on the following medications, using the teach-back method.   Potassium chloride  Tessalon    Learners of pharmacy medication education included:  patient    Patient +/- learner response:

## 2017-06-02 NOTE — PHYSICIAN QUERY
"PT Name: Angel Rincon  MR #: [id number]    Physician Query Form - Heart  Condition Clarification     CDS/: Romi Santos               Contact information:[email or web address]  This form is a permanent document in the medical record.     Query Date: June 2, 2017    By submitting this query, we are merely seeking further clarification of documentation. Please utilize your independent clinical judgment when addressing the question(s) below.    The medical record contains the following   Indicators     Supporting Clinical Findings Location in Medical Record    BNP      EF      Radiology findings      Echo Results      "Ascites" documented     x "SOB" or "RAZO" documented SOB on exertion worse than baseline x 2-3 weeks    Episode of SOB overnight    Shortness of breath and oxygen requirement PN 06/02          Plan of Care Note 06/02    "Hypoxia" documented     x Heart Failure documented RAZO w/ HFpEF PN 06/20   x "Edema" documented Mild bilateral lower extremity pitting edema    Crackles present it mid lung zones and 2+ edema to mid shins. PN 06/20      Plan of Care Note 06/02   x Diuretics/Meds Will give trial of lasix 60mg IV x 1, reassess volume for need for further diuresis. PN Attestation 06/20, MAR   x Treatment: Fluids stopped  PN06/20   x Other:  Volume Overload  PN Attestation 06/20       Provider, please specify diagnosis or diagnoses associated with above clinical findings.                                 [  ] Acute on Chronic Diastolic Heart Failure( EF > 40)*    [x] Chronic Diastolic Heart Failure (EF > 40)*    [  ] Other (please specify): ___________________________________  [  ] Clinically Undetermined            *American [employer] Association                                                                                                          Please document in your progress notes daily for the duration of treatment until resolved and include in your discharge summary.    "

## 2017-06-03 VITALS
BODY MASS INDEX: 27.97 KG/M2 | OXYGEN SATURATION: 96 % | WEIGHT: 120.88 LBS | HEIGHT: 55 IN | DIASTOLIC BLOOD PRESSURE: 67 MMHG | RESPIRATION RATE: 20 BRPM | HEART RATE: 113 BPM | TEMPERATURE: 99 F | SYSTOLIC BLOOD PRESSURE: 125 MMHG

## 2017-06-03 PROBLEM — I50.43 ACUTE ON CHRONIC COMBINED SYSTOLIC AND DIASTOLIC HEART FAILURE: Status: ACTIVE | Noted: 2017-06-03

## 2017-06-03 LAB
ALBUMIN SERPL BCP-MCNC: 2.7 G/DL
ALP SERPL-CCNC: 102 U/L
ALT SERPL W/O P-5'-P-CCNC: 25 U/L
ANION GAP SERPL CALC-SCNC: 10 MMOL/L
ANISOCYTOSIS BLD QL SMEAR: ABNORMAL
AST SERPL-CCNC: 29 U/L
BACTERIA BLD CULT: NORMAL
BACTERIA BLD CULT: NORMAL
BASOPHILS # BLD AUTO: ABNORMAL K/UL
BASOPHILS NFR BLD: 0 %
BILIRUB SERPL-MCNC: 1.2 MG/DL
BLASTS NFR BLD MANUAL: 1 %
BUN SERPL-MCNC: 40 MG/DL
BURR CELLS BLD QL SMEAR: ABNORMAL
CALCIUM SERPL-MCNC: 8 MG/DL
CHLORIDE SERPL-SCNC: 101 MMOL/L
CO2 SERPL-SCNC: 19 MMOL/L
CREAT SERPL-MCNC: 1.5 MG/DL
DACRYOCYTES BLD QL SMEAR: ABNORMAL
DIFFERENTIAL METHOD: ABNORMAL
EOSINOPHIL # BLD AUTO: ABNORMAL K/UL
EOSINOPHIL NFR BLD: 0 %
ERYTHROCYTE [DISTWIDTH] IN BLOOD BY AUTOMATED COUNT: 20.2 %
EST. GFR  (AFRICAN AMERICAN): 36 ML/MIN/1.73 M^2
EST. GFR  (NON AFRICAN AMERICAN): 31 ML/MIN/1.73 M^2
GIANT PLATELETS BLD QL SMEAR: PRESENT
GLUCOSE SERPL-MCNC: 166 MG/DL
HCT VFR BLD AUTO: 33 %
HGB BLD-MCNC: 10.1 G/DL
HYPOCHROMIA BLD QL SMEAR: ABNORMAL
LYMPHOCYTES # BLD AUTO: ABNORMAL K/UL
LYMPHOCYTES NFR BLD: 1 %
MCH RBC QN AUTO: 23.4 PG
MCHC RBC AUTO-ENTMCNC: 30.6 %
MCV RBC AUTO: 76 FL
MONOCYTES # BLD AUTO: ABNORMAL K/UL
MONOCYTES NFR BLD: 4 %
NEUTROPHILS NFR BLD: 89 %
NEUTS BAND NFR BLD MANUAL: 5 %
NRBC BLD-RTO: ABNORMAL /100 WBC
OVALOCYTES BLD QL SMEAR: ABNORMAL
PLATELET # BLD AUTO: 169 K/UL
PLATELET BLD QL SMEAR: ABNORMAL
PMV BLD AUTO: ABNORMAL FL
POIKILOCYTOSIS BLD QL SMEAR: ABNORMAL
POLYCHROMASIA BLD QL SMEAR: ABNORMAL
POTASSIUM SERPL-SCNC: 4 MMOL/L
PROT SERPL-MCNC: 5.4 G/DL
RBC # BLD AUTO: 4.32 M/UL
SODIUM SERPL-SCNC: 130 MMOL/L
WBC # BLD AUTO: 23.99 K/UL

## 2017-06-03 PROCEDURE — 36415 COLL VENOUS BLD VENIPUNCTURE: CPT

## 2017-06-03 PROCEDURE — 25000003 PHARM REV CODE 250: Performed by: STUDENT IN AN ORGANIZED HEALTH CARE EDUCATION/TRAINING PROGRAM

## 2017-06-03 PROCEDURE — 85007 BL SMEAR W/DIFF WBC COUNT: CPT

## 2017-06-03 PROCEDURE — 63600175 PHARM REV CODE 636 W HCPCS: Performed by: STUDENT IN AN ORGANIZED HEALTH CARE EDUCATION/TRAINING PROGRAM

## 2017-06-03 PROCEDURE — 85027 COMPLETE CBC AUTOMATED: CPT

## 2017-06-03 PROCEDURE — 25000003 PHARM REV CODE 250: Performed by: HOSPITALIST

## 2017-06-03 PROCEDURE — 94640 AIRWAY INHALATION TREATMENT: CPT

## 2017-06-03 PROCEDURE — 80053 COMPREHEN METABOLIC PANEL: CPT

## 2017-06-03 PROCEDURE — 25000242 PHARM REV CODE 250 ALT 637 W/ HCPCS: Performed by: STUDENT IN AN ORGANIZED HEALTH CARE EDUCATION/TRAINING PROGRAM

## 2017-06-03 PROCEDURE — 25000003 PHARM REV CODE 250: Performed by: INTERNAL MEDICINE

## 2017-06-03 PROCEDURE — 94761 N-INVAS EAR/PLS OXIMETRY MLT: CPT

## 2017-06-03 RX ORDER — CARVEDILOL 12.5 MG/1
12.5 TABLET ORAL 2 TIMES DAILY WITH MEALS
Qty: 60 TABLET | Refills: 11 | Status: SHIPPED | OUTPATIENT
Start: 2017-06-03 | End: 2018-06-03

## 2017-06-03 RX ADMIN — LACTOBACILLUS TAB 4 TABLET: TAB at 08:06

## 2017-06-03 RX ADMIN — CARVEDILOL 6.25 MG: 6.25 TABLET, FILM COATED ORAL at 08:06

## 2017-06-03 RX ADMIN — HYDROXYUREA 500 MG: 500 CAPSULE ORAL at 08:06

## 2017-06-03 RX ADMIN — NICOTINE 1 PATCH: 21 PATCH, EXTENDED RELEASE TRANSDERMAL at 08:06

## 2017-06-03 RX ADMIN — RIVAROXABAN 20 MG: 20 TABLET, FILM COATED ORAL at 08:06

## 2017-06-03 RX ADMIN — ROSUVASTATIN CALCIUM 10 MG: 10 TABLET ORAL at 08:06

## 2017-06-03 RX ADMIN — PREDNISONE 50 MG: 10 TABLET ORAL at 08:06

## 2017-06-03 RX ADMIN — IPRATROPIUM BROMIDE AND ALBUTEROL SULFATE 3 ML: .5; 3 SOLUTION RESPIRATORY (INHALATION) at 08:06

## 2017-06-03 RX ADMIN — HYDROCODONE BITARTRATE AND ACETAMINOPHEN 1 TABLET: 7.5; 325 TABLET ORAL at 12:06

## 2017-06-03 RX ADMIN — LACTOBACILLUS TAB 4 TABLET: TAB at 12:06

## 2017-06-03 RX ADMIN — FAMOTIDINE 20 MG: 20 TABLET, FILM COATED ORAL at 08:06

## 2017-06-03 NOTE — DISCHARGE SUMMARY
[facility] Internal Medicine Discharge Summary    Primary Team: [facility] Internal Medicine Team B  Attending Physician: Bradley  Resident: Linsey  Intern: [place]    Date of Admit: 5/29/2017  Date of Discharge: 6/3/2017    Discharge to: Home  Condition: Stable    Discharge Diagnoses     Patient Active Problem List   Diagnosis    S/P CABG (coronary artery bypass graft)    HTN (hypertension)    HLD (hyperlipidemia)    Back pain    UTI (lower urinary tract infection)    Acute pain of left hip    Restless leg syndrome    Peripheral arterial disease    Atrial fibrillation, controlled    Polycythemia vera    Chronic alcoholism in remission    Chronic diarrhea    Chronic insomnia    Lumbar radiculopathy    Peripheral polyneuropathy    Rheumatoid factor positive with cyclic citrullinated peptide (CCP) antibody negative    Coagulation defect    Shaking spells    Chronic diastolic heart failure    Leukocytosis    Thrombocytosis    Normocytic anemia    Chewing tobacco nicotine dependence    Bilateral lower leg cellulitis    Nephrolithiasis    Acute cystitis without hematuria    Non-rheumatic tricuspid valve insufficiency    Narcotic dependence    SAI (acute kidney injury)    Dehydration    Hyponatremia    Metabolic acidosis    Acute on chronic combined systolic and diastolic heart failure       Consultants and Procedures     Consultants:  None    Procedures:   None    Brief History of Present Illness      Angel Rincon is a 87 y.o. female who  has a past medical history of Angina of effort; Arthritis; Back pain; Cancer; Hyperlipidemia; Hypertension; Osteoporosis; Polycythemia; and Restless leg.  The patient presented to [facility] on 5/29/2017 with a primary complaint of Fatigue (increase in weakness with cough and low grade fever x 2 weeks, currently on antibiotics)  The patient was in their usual state of health until ~2 weeks PTA when she began to have shortness of breath, cough,  fatigue, and poor appetite.  She described the cough as dry, and associated with a sore throat. These findings were also associated with leg swelling and increased shortness of breath with ambulation. This continued on for ~10 days, and then she had a fever, measured at home as 101.  Thus she went to the doctor, who prescribed her Azithromycin and went up on her lasix (from 40 to 60).  Her leg swelling improved, however, her cough did not and she also developed a headache.  With continued symptoms she decided to present to the ED. She denies any recent symptoms of dysuria, urinary frequency    For the full HPI please refer to the History & Physical from this admission.    Hospital Course By Problem with Pertinent Findings     Pneumonia  - CXR with interstitial pattern  patient with indolent respiratory course  - likely atypical pneumonia with procal 1.11  modified barium with no evidence of aspiration or penetration noted across any consistencies  urine legionella Ag negative  - completed abx course with azithro and moxi and stable for discharge     SAI  Cr 1.6 w/ baseline 0.9. UA w/ negative LE and nitrite, 3 WBC, +hyaline casts  - elevated to 2.0  Suspect prerenal  will hydrate gently given h/o HFpEF  - FeUrea 7.6% consistent with prerenal  - UCx NGTD  - Cr improved day of discharge to 1.5  responded to increased lasix dose appropriately without further renal impairment  - held cozaar while inpatient  instructed patient to hold for the next week and she is scheduled for outpatient BMP to assess renal function with increased lasix dosing for the next 3 days  can restart per PCP if renal function stable     RAZO w/ HFpEF (resolved)  - SOB on exertion worse than baseline x 2-3 weeks  CXR w/ no pulm edema, improved from prior study  crackles on presentation  - BNP 400s baseline  EKG w/ afib, rate controlled, no acute ischemic changes.  - Last echo w/ nml EF, no comment on DD but PASP WNL 39.  - diuresed  gently with SAI in consideration  improved respiratory status on discharge  - instructed to take lasix 80mg PO daily for 3 days, then go back to dose of 40mg PO daily  to follow up with PCP in 1 week's time with BMP results as above    HTN  - Controlled while inpatient   - holding cozaar as above  increasing coreg as below    Afib on anticoagulation  - Con't Xarelto  increased Coreg to 12.5 as rates borderline high      Abnormal Peripheral Smear  - noted on 6/1  teardrop cells, blastoid cells and circulating nucleated red cells  concerning for myelofibrosis  - Heme/onc consulted  sees Dr. Vaca at   no further w/u initiated as patient regularly follows up  blood counts stable      Leukocytosis  CXR w/ no focal consolidation. UA w/ no e/o UTI. Diffuse abd pain and epigastric TTP, bili elevated.  - RUQ ultrasound with cholelithiasis, no gallbladder thickening     Hyponatremia (improved)  Na 128. Hypovolemic by history and exam. S/p 500ml NS bolus in ED.   - likely hypotonic  IVF  improved to 130 today  - Follow daily BMPs     Hypoalbuminemia  2.9.   - Follow CMP, encourage nutrition     Polycythemia Vera  Con't home hydroxyurea  Follow with heme/onc     CAD s/p PCI x 3 and CABG 3v  No CP. Tn 0.022. EKG w/ no acute ischemic changes.     HLD  Con't home statin    Discharge Medications        Medication List      CHANGE how you take these medications    carvedilol 12.5 MG tablet  Commonly known as:  COREG  Take 1 tablet (12.5 mg total) by mouth 2 (two) times daily with meals.  What changed:   medication strength   how much to take     furosemide 40 MG tablet  Commonly known as:  LASIX  Take 1 tablet (40 mg total) by mouth once daily.  What changed:  how much to take        CONTINUE taking these medications    ergocalciferol 50,000 unit Cap  Commonly known as:  ERGOCALCIFEROL  Take 1 capsule (50,000 Units total) by mouth once a week.     hydrocodone-acetaminophen 7.5-325mg 7.5-325 mg per tablet  Commonly  known as:  NORCO     hydroxyurea 500 mg Cap  Commonly known as:  HYDREA  TAKE 1 CAPSULE TWICE DAILY     nitroGLYCERIN 0.4 MG/DOSE TL SPRY 400 mcg/spray spray  Commonly known as:  NITROLINGUAL     potassium chloride 20 mEq Pack  Commonly known as:  KLOR-CON  Take 20 mEq by mouth once daily.     rosuvastatin 10 MG tablet  Commonly known as:  CRESTOR  Take 1 tablet (10 mg total) by mouth once daily.     triamcinolone acetonide 0.1% 0.1 % cream  Commonly known as:  KENALOG  AAA bid to back prn itching. Not more than 2 weeks straight in the location     XARELTO 20 mg Tab  Generic drug:  rivaroxaban        STOP taking these medications    azithromycin 250 MG tablet  Commonly known as:  Z-RAINE     losartan 50 MG tablet  Commonly known as:  COZAAR           Where to Get Your Medications      You can get these medications from any pharmacy    Bring a paper prescription for each of these medications   carvedilol 12.5 MG tablet         Discharge Information:   Diet:  cardiac    Physical Activity:  As tolerated    Instructions:  1. Take all medications as prescribed  2. Keep all follow-up appointments  3. Return to the hospital or call your primary care physicians if any worsening symptoms such as worsening SOB, uncontrollable leg swelling, chest pain occur.    Referral placed for home health for medication management, skilled nurse visit, and PT/OT eval and treat    Follow-Up Appointments:  Future Appointments  Date Time Provider Department Center   6/7/2017 1:00 PM Romi Rogers MD [facility]   6/29/2017 11:00 AM Pino Estrada MD [facility] in 1 week as outpatient       Bradley Saravia  [facility] Internal Medicine, [place]

## 2017-06-03 NOTE — PLAN OF CARE
Ok for home health to come Monday 6/5.     Sanchez Irvin   [facility] Internal Medicine PGY-1

## 2017-06-03 NOTE — PLAN OF CARE
Removed iv, tip intact.Removed tele, no ectopy on tele.Went over discharge instructions, verbalized understanding.

## 2017-06-03 NOTE — PLAN OF CARE
Problem: Patient Care Overview  Goal: Plan of Care Review  Outcome: Ongoing (interventions implemented as appropriate)  Lungs mostly clear, diminished at the bases.  Complains of shortness of breath at times, O2 sats.  97% on room air.  No elevated temperatures.  Telemetry with A fib.  Fall precautions maintained.  Family at the bedside.  Bed alarm on.  Continue with plan of care.

## 2017-06-03 NOTE — PROGRESS NOTES
Called into pt room by nurse for reported SOB.  Examined pt and found normal and clear respirations, pt lying in bed and sat up to 45 degrees. SATS were 98% and HR was 128. Family stated this occurred after she got up to use the commode. Notified nurse of findings and told family to page nurse again if the situation does not get better. Pt complained of headache upon second examination 5 min later.

## 2017-06-03 NOTE — PLAN OF CARE
Problem: Patient Care Overview  Goal: Plan of Care Review  Outcome: Ongoing (interventions implemented as appropriate)  Pt on room air in no apparent distress.  Breathing tx. Given with good pt. Effort.  Will cont. To monitor.

## 2017-06-03 NOTE — PLAN OF CARE
[place] faxed HH orders to [facility] on call, Cate, [phone] ([phone]), [phone]    Cate will set up with [facility]    [facility] will contact pt's daughter Nori [phone] to arrange appt time    No HME ordered    Per Dr Irvin, OK for HH to see pt Monday, 2nd day after discharge       06/03/17 1229   Final Note   Assessment Type Final Discharge Note   Discharge Disposition Home-Health  (HH set up through [facility] )   What phone number can be called within the next 1-3 days to see how you are doing after discharge? [phone]  (daughter Nori's number)   Hospital Follow Up  Appt(s) scheduled? No  (offices closed, [place] to follow up)   Offered [facility] -- Bedside Delivery? n/a   Discharge/Hospital Encounter Summary to ([facility]) PCP Yes   Referral to Outpatient Case Management complete? n/a   Referral to / orders for Home Health Complete? Yes  (HH set up through [facility] )   Right Care Referral Info   Post Acute Recommendation Home-care  (HH set up through [facility] )   Referral Type HH set up through [facility]     Facility Name HH set up through [facility]       Shilpi Sands RN Transitional Navigator  [phone]

## 2017-06-03 NOTE — PLAN OF CARE
Problem: Patient Care Overview  Goal: Plan of Care Review  Outcome: Ongoing (interventions implemented as appropriate)  Pt on RA with sats of 100. Will continue to monitor.

## 2017-06-03 NOTE — MEDICAL/APP STUDENT
[facility] Medicine Medical Student Progress Note      Subjective:   Mrs. Rincon is an 87 year old female being followed by our service for aspiration PNA and SAI. Was given Lasix 60mg yesterday for fluid overload. Notes improvement in urine output and shortness of breath. Reports that her lower extremity swelling is at baseline.  Was able to walk in [place] without shortness of breath but does have occasional bouts of SOB using bathroom. No episodes of CP or diarrhea. Also c/o mild back pain.      Objective:      Tmax: 98.8  Pulse:   RR: 18-20  BP: (100-134)/(58-80)  SpO2:      I/O:   In - 615  Out - 925 urine (0.7 ml/kg/hr) - 0.4 yesterday     Physical Exam:      General: Resting comfortably in bed. NAD.   HENT: NCAT, neck supple, MMM  Cardio: tachycardic, irregularly irregular rhythm  Resp: Rales bilaterally at the bases, improved from yesterday  Abdomen: nontender, nondistended, no guarding or rebound. +BS  Extremities: mild bilateral lower extremity 2+ pitting edema   Pulses: 2+ and symmetric distally  Neuro/Psych:  Awake and alert, cooperative with exam    Most Recent Data:  CBC:   Lab Results   Component Value Date    WBC 23.99 (H) 06/03/2017    HGB 10.1 (L) 06/03/2017    HCT 33.0 (L) 06/03/2017     06/03/2017    MCV 76 (L) 06/03/2017    RDW 20.2 (H) 06/03/2017     WBC Differential: 89 % N, 5 % Bands, 1 % L, 4 % M, 0 % Eo, 0 % Baso, tear drop cells, tasha cells, large/giant platelets seen  BMP:   Lab Results   Component Value Date     (L) 06/03/2017    K 4.0 06/03/2017     06/03/2017    CO2 19 (L) 06/03/2017    BUN 40 (H) 06/03/2017    CREATININE 1.5 (H) 06/03/2017     (H) 06/03/2017    CALCIUM 8.0 (L) 06/03/2017    MG 2.4 03/02/2017    PHOS 3.8 03/02/2017     LFTs:   Lab Results   Component Value Date    PROT 5.4 (L) 06/03/2017    ALBUMIN 2.7 (L) 06/03/2017    BILITOT 1.2 (H) 06/03/2017    AST 29 06/03/2017    ALKPHOS 102 06/03/2017    ALT 25 06/03/2017     Coags:   Lab  Results   Component Value Date    INR 1.1 08/18/2016     FLP:   Lab Results   Component Value Date    CHOL 82 (L) 03/02/2017    HDL 40 03/02/2017    LDLCALC 27.8 (L) 03/02/2017    TRIG 71 03/02/2017    CHOLHDL 48.8 03/02/2017     DM:   Lab Results   Component Value Date    HGBA1C 5.6 03/02/2017    HGBA1C 5.8 06/02/2016    LDLCALC 27.8 (L) 03/02/2017    CREATININE 1.5 (H) 06/03/2017     Thyroid:   Lab Results   Component Value Date    TSH 1.766 05/29/2017     Anemia:   Lab Results   Component Value Date    IRON 28 (L) 03/02/2017    TIBC 460 (H) 03/02/2017    FERRITIN 93 03/02/2017    ZLVAAJSD70 855 03/02/2017    FOLATE 8.9 03/02/2017     Cardiac:   Lab Results   Component Value Date    TROPONINI 0.022 05/29/2017     (H) 05/29/2017     Urinalysis:   Lab Results   Component Value Date    LABURIN No significant growth 05/29/2017    COLORU [place] (A) 05/29/2017    SPECGRAV 1.025 05/29/2017    NITRITE Negative 05/29/2017    PROTEINUR 30 01/13/2015    KETONESU Trace (A) 05/29/2017    UROBILINOGEN 2.0-3.0 (A) 05/29/2017    BILIRUBINUR negative 01/13/2015    WBCUA 3 05/29/2017       Trended Lab Data:    Recent Labs  Lab 06/01/17  0303 06/01/17  0304 06/02/17  0338 06/03/17  0401   WBC  --  23.20* 22.52* 23.99*   HGB  --  10.5* 10.4* 10.1*   HCT  --  34.3* 34.4* 33.0*   PLT  --  227 199 169   MCV  --  77* 77* 76*   RDW  --  20.0* 20.1* 20.2*   *  --  128* 130*   K 3.6  --  3.3* 4.0     --  100 101   CO2 15*  --  18* 19*   BUN 38*  --  39* 40*   CREATININE 2.0*  --  1.7* 1.5*   *  --  202* 166*   PROT 5.5*  --  5.3* 5.4*   ALBUMIN 2.8*  --  2.6* 2.7*   BILITOT 0.9  --  1.0 1.2*   AST 33  --  29 29   ALKPHOS 89  --  94 102   ALT 18  --  21 25       Trended Cardiac Data:    Recent Labs  Lab 05/29/17  1643   TROPONINI 0.022   *       Microbiology Data:  6/1 - Stool cx no growth x1d  6/1 - C.diff negative   5/29 - Blood cx  NGTDx4  5/29 - Urine cx NGTDx4  5/31 Legionella Ag not detected      Radiology:  Imaging Results          Fl Modified Barium Swallow Speech (Final result)  Result time 06/01/17 11:14:05    Final result by Catrina Ardon MD (06/01/17 11:14:05)                 Impression:      No evidence of laryngeal penetration or tracheal aspiration.  Please see speech pathology report for further details.      Electronically signed by: CATRINA ARDON MD  Date:     06/01/17  Time:    11:14              Narrative:    Clinical indication: 87-year-old female with possible aspiration.    Comparison: None.    Technique: Video fluoroscopic swallowing examination was performed in conjunction with the speech pathology department.  Various liquid and solid food substances were used to assess swallowing.    Findings: Normal oral transit was observed. Both regular and thin barium, as well as semisolid and solid food substances were swallowed without difficulty.  No laryngeal penetration or tracheal aspiration was observed.  Please see speech pathology report for further details.    Total fluoroscopic time was 2 minutes 2 seconds.                             NM Lung Ventilation Perfusion Imaging (Final result)  Result time 05/31/17 13:38:17    Final result by Rafal Watkins MD (05/31/17 13:38:17)                 Impression:     Low probability V/Q study.      Electronically signed by: RAFAL WATKINS MD  Date:     05/31/17  Time:    13:38              Narrative:    Nuclear medicine ventilation and perfusion imaging.    Findings: 5 mCi of technetium labeled MAA was injected for the perfusion portion of today's examination and 15 mCi of xenon-133 gas was used for the ventilation portion. This examination is interpreted in correlation with chest radiograph performed on same date. There is no significant ventilation perfusion mismatch.                             X-Ray Chest 1 View (Final result)  Result time 05/31/17 13:03:19    Final result by Jamilah Schulte MD (05/31/17 13:03:19)                  Impression:    No significant change from the prior study .      Electronically signed by: ROXANN CONTRERAS MD  Date:     05/31/17  Time:    13:03              Narrative:    AP chest radiograph.      Comparison: 5/29/17    Results: Sternotomy wires.  The cardiac silhouette is enlarged.  Mild increased interstitial lung markings.  No focal airspace disease.  No pleural effusion.  No pneumothorax.  The osseous structures appear normal for age.                             US Abdomen Limited (Final result)  Result time 05/30/17 10:39:45    Final result by Leeroy Kline MD (05/30/17 10:39:45)                 Impression:        Cholelithiasis.  Negative sonographic Zavala's sign.  No pericholecystic fluid or biliary duct dilatation.      Electronically signed by: Leeroy Kline MD  Date:     05/30/17  Time:    10:39              Narrative:    Comparison: 3/16/17.    Results: Limited right upper quadrant ultrasound performed. The visualized portions of the pancreas, abdominal aorta, and IVC are unremarkable.   One gallstone identified.  Negative sonographic Zavala's sign.  No gallbladder wall thickening.  No para cholecystic fluid. The common bile duct is within normal limits at 4 mm. The liver measures 15.5 cm in length and demonstrates homogeneous echogenicity. The right kidney is normal in length measuring 11.8cm. No right sided hydronephrosis or renal masses identified. Renal cortical thinning noted.                             X-Ray Chest PA And Lateral (Final result)  Result time 05/29/17 18:04:29    Final result by Ron Rea MD (05/29/17 18:04:29)                 Impression:       No acute intrathoracic process.          Electronically signed by: RON REA MD  Date:     05/29/17  Time:    18:04              Narrative:    [id number]  Accession # [id number]      Study:  XR CHEST PA AND LATERAL    Indication: COUGH.    Comparison: Chest radiograph from 03/01/2017.    Findings:     XR CHEST PA AND  LATERAL.    No significant change when compared to prior exam.    Postsurgical changes status post sternotomy.  No cardiomegaly.  Calcification and tortuosity of the thoracic aorta.  Normal pulmonary vasculature.  Lungs are clear.  Trace bilateral pleural effusions.  Degenerative changes of the thoracic spine.                            5/30 - 2D Echo Impressions     1 - Biatrial enlargement.     2 - Concentric remodeling.     3 - No wall motion abnormalities.     4 - Normal left ventricular systolic function (EF 55-60%).     5 - Normal left ventricular diastolic function.     6 - Normal right ventricular systolic function .     7 - The estimated PA systolic pressure is 37 mmHg.     8 - Mild aortic regurgitation.     9 - Moderate tricuspid regurgitation.     10 - Increased central venous pressure.    Scheduled Meds:   albuterol-ipratropium 2.5mg-0.5mg/3mL  3 mL Nebulization Q6H WAKE    carvedilol  6.25 mg Oral BID WM    famotidine  20 mg Oral Daily    hydroxyurea  500 mg Oral Daily    Lactobacillus acidoph-L.bulgar  4 tablet Oral TID WM    moxifloxacin  400 mg Intravenous Q24H    nicotine  1 patch Transdermal Daily    predniSONE  50 mg Oral Daily    rivaroxaban  20 mg Oral Daily    rosuvastatin  10 mg Oral Daily     Continuous Infusions:   PRN Meds:.benzonatate, dextrose 50%, dextrose 50%, glucagon (human recombinant), glucose, glucose, hydrocodone-acetaminophen 7.5-325mg      Assessment:     Mrs. Rincon is an 87 year old female here for SAI and PNA with:      Patient Active Problem List   Diagnosis    S/P CABG (coronary artery bypass graft)    HTN (hypertension)    HLD (hyperlipidemia)    Back pain    UTI (lower urinary tract infection)    Acute pain of left hip    Restless leg syndrome    Peripheral arterial disease    Atrial fibrillation, controlled    Polycythemia vera    Chronic alcoholism in remission    Chronic diarrhea    Chronic insomnia    Lumbar radiculopathy    Peripheral  polyneuropathy    Rheumatoid factor positive with cyclic citrullinated peptide (CCP) antibody negative    Coagulation defect    Shaking spells    Chronic diastolic heart failure    Leukocytosis    Thrombocytosis    Normocytic anemia    Chewing tobacco nicotine dependence    Bilateral lower leg cellulitis    Nephrolithiasis    Acute cystitis without hematuria    Non-rheumatic tricuspid valve insufficiency    Narcotic dependence    SAI (acute kidney injury)    Dehydration    Hyponatremia    Metabolic acidosis     Plan:     Myelofibrosis   - Per heme/onc, peripheral smear shows cells concerning for a primary hematological process such as myelofibrosis. Blood counts are stable and patient can follow up with her outpatient hematoligst at [facility].    - Possible source of leukocytosis in addition to her prednisone  Acute Hypoxic Respiratory Failure 2/2 Fluid Overload  -Most likely exacerbation of heart failure 2/2 withholding lasix for SAI. 2+ edema at baseline per patient. SOB improved over past 24 hours with dose of Lasix 60mg.   - Also considering underlying pulm htn. Given h/o HFpEF, PA systolic pressure on echo done 5/29 at 37, now with dx of myelofibrosis. Will consult with staff.   Aspiration Pneumonia  - CXR with interstitial pattern  - patient with indolent respiratory course  - likely atypical pneumonia with procal 1.11  - covering with moxifloxacin - currently day 4  - swallow study done yesterday showed no concerning signs of aspiration  SAI (Improving)  -Likely prerenal 2/2 dehydration, pt with multiple bouts of diarrhea on admit (resolved / C diff negative)    - Cr 2 -> 1.7 -> 1.5.   - Urine output improved from yesterday  - Continue to hydrate gently with h/o HFpEF.   Hypovolemic Hyponatremia   - Na 128 -> 130  - Will continue to replace gently given h/o heart failure  - CMP daily  Decreased albumin  - encourage nutrition   Atrial fibrillation   - mildly tachycardic  overnight, most likely 2/2 low back pain  - on Xarelto  HTN  - controlled on home meds  Polycythemia vera  - continue hydroxyurea, f/u with outpatient heme at [facility]  Microcytic anemia  - iron supplementation on disharge  Low back pain  - PRN hydrocodone-acetaminophen 7.5-325mg BID. Recommend Tylenol Arthritis at home instead of opioid medication.       Diet: Cardiac  Prophylaxis: heparin  Code: Full     Dispo: Home today    Shailesh Benitez MS3  [facility] Hospital Medicine Team B

## 2017-06-03 NOTE — PROGRESS NOTES
[facility] Resident [facility] Progress Note    Subjective:      Angel iRncon is a 87 y.o. female who is being followed by the [facility] service at [facility] for pneumonia and SAI.    SOB improved after lasix dose yesterday. Able to walk in the [place] without SOB. Leg swelling back to baseline per patient. Denies any chest pain.  Feels comfortable with returning home today.      Objective:   Last 24 Hour Vital Signs:  BP  Min: 100/58  Max: 134/66  Temp  Av.1 °F (36.7 °C)  Min: 97.4 °F (36.3 °C)  Max: 98.8 °F (37.1 °C)  Pulse  Av.5  Min: 98  Max: 133  Resp  Av.9  Min: 18  Max: 20  SpO2  Av %  Min: 94 %  Max: 100 %  I/O last 3 completed shifts:  In: 1615 [P.O.:740; I.V.:875]  Out: 1125 [Urine:1125]    Physical Examination:  Vitals:    17 0343   BP: 121/68   Pulse: (!) 124   Resp: 20   Temp: 98.8 °F (37.1 °C)     Gen: NAD, sitting in chair comfortably  HENT: NCAT, EOMI, MMM  CV: RRR, mild diastolic murmur best heard in lower R sternal border  Pulm: faint bibasilar crackles  Abd: normoactive, mild diffuse ttp, nondistended  Extremities: mild bilateral lower extremity pitting edema, 2+ pulses in all extremities  Skin: varicose veins to bilateral lower extremities  Neuro: moving all extremities well, gross sensation to light touch preserved, nonfocal  Psych: AAOx4, calm, cooperative, conversational      Laboratory:  Laboratory Data Reviewed: yes  Pertinent Findings:  WBC 20.7 -> 18.7 -> 24.10 -> 23.2 ->22.5  Bands 0 -> 5 -> 2 ->2->7->5  Na 128 -> 129 -> 128-> 128->130  HCO3 15 -> 17 -> 16 ->15  Cr 1.6 -> 1.5 -> 1.8 ->2.0 ->1.7-> 1.5    Microbiology Data Reviewed: yes  Pertinent Findings:   Blood Cx NGTD   Urine Cx NGTD    Other Results:  EKG (my interpretation): none new    Radiology Data Reviewed: yes  Pertinent Findings:  none    Current Medications:     Infusions:        Scheduled:   albuterol-ipratropium 2.5mg-0.5mg/3mL  3 mL Nebulization Q6H WAKE    carvedilol  6.25 mg Oral  BID WM    famotidine  20 mg Oral Daily    hydroxyurea  500 mg Oral Daily    Lactobacillus acidoph-L.bulgar  4 tablet Oral TID WM    moxifloxacin  400 mg Intravenous Q24H    nicotine  1 patch Transdermal Daily    predniSONE  50 mg Oral Daily    rivaroxaban  20 mg Oral Daily    rosuvastatin  10 mg Oral Daily        PRN:  benzonatate, dextrose 50%, dextrose 50%, glucagon (human recombinant), glucose, glucose, hydrocodone-acetaminophen 7.5-325mg    Antibiotics and Day Number of Therapy:  Rocephin, Azithro 5/30-current    Lines and Day Number of Therapy:  PIV    Assessment:     Angel Rincon is a 87 y.o.female with  Patient Active Problem List    Diagnosis Date Noted    Dehydration     Hyponatremia     Metabolic acidosis     SAI (acute kidney injury) 05/29/2017    Narcotic dependence 04/21/2017    Nephrolithiasis 03/09/2017    Acute cystitis without hematuria 03/09/2017    Non-rheumatic tricuspid valve insufficiency 03/09/2017    Chronic diastolic heart failure 03/02/2017    Leukocytosis 03/02/2017    Thrombocytosis 03/02/2017    Normocytic anemia 03/02/2017    Chewing tobacco nicotine dependence 03/02/2017    Bilateral lower leg cellulitis 03/02/2017    Shaking spells     Coagulation defect 12/12/2016    Rheumatoid factor positive with cyclic citrullinated peptide (CCP) antibody negative 08/18/2016    Lumbar radiculopathy 07/18/2016    Peripheral polyneuropathy 07/18/2016    Chronic insomnia 07/07/2016    Chronic alcoholism in remission 06/14/2016    Chronic diarrhea 06/14/2016    Peripheral arterial disease 06/04/2016    Atrial fibrillation, controlled 06/04/2016    Polycythemia vera 06/04/2016    Restless leg syndrome 05/20/2015    Acute pain of left hip 02/03/2015    Back pain 01/13/2015    UTI (lower urinary tract infection) 01/13/2015    HLD (hyperlipidemia) 08/19/2014    S/P CABG (coronary artery bypass graft) 08/14/2013    HTN (hypertension) 08/14/2013        Plan:      Pneumonia  - CXR with interstitial pattern  - patient with indolent respiratory course  - likely atypical pneumonia with procal 1.11 and possible aspiration  modified barium with no evidence of aspiration or penetration noted across any consistencies  urine legionella Ag negative  - on moxifloxacin     SAI  Cr 1.6 w/ baseline 0.9. UA w/ negative LE and nitrite, 3 WBC, +hyaline casts  - Suspect prerenal, will hydrate gently given h/o HFpEF  - Procal 1.11  - FeUrea 7.6% consistent with prerenal  - UCx NGTD  - Cr improved today to 1.5  lasix 60mg IV once yesterday with increased UOP     Abnormal Peripheral Smear  - noted on 6/1  teardrop cells, blastoid cells and circulating nucleated red cells  concerning for myelofibrosis  - Heme/onc consulted  sees Dr. Vaca at   no further w/u initiated as patient regularly follows up  blood counts stable     Leukocytosis  CXR w/ no focal consolidation. UA w/ no e/o UTI. Diffuse abd pain and epigastric TTP, bili elevated.  - RUQ ultrasound with cholelithiasis, no gallbladder thickening  - moxi as above    Hyponatremia  Na 128. Hypovolemic by history and exam. S/p 500ml NS bolus in ED.   - likely hypotonic  IVF  improved to 130 today  - Follow daily BMPs     RAZO w/ HFpEF  SOB on exertion worse than baseline x 2-3 weeks. CXR w/ no pulm edema, improved from prior study. BNP 400s, baseline. EKG w/ afib, rate controlled, no acute ischemic changes. Last echo w/ nml EF, no comment on DD but PASP WNL 39.  - lasix yesterday as above with improvement     Hypoalbuminemia  2.9.   - Follow CMP, encourage nutrition     Afib on anticoagulation  On Xarelto. Rate controlled.  - Con't Xarelto, BB     HTN  Controlled. Con't home antihypertensives.     Polycythemia Vera  Con't home hydroxyurea     Normocytic anemia  Iron studies suggest WILTON.  - Iron supplementation on discharge     CAD s/p PCI x 3 and CABG 3v  No CP. Tn 0.022. EKG w/ no acute ischemic changes.     HLD  Con't home  statin.     Diet: Cardiac  Prophylaxis: heparin  Code: Full    Dispo: pending clinical improvement of SOB and SAI     Bradley Saravia MD HO-1  [facility] Internal Medicine Team B    [facility] Medicine Hospitalist Pager numbers:   [facility] Hospitalist Medicine Team A (Elena/Riri): [phone]  [facility] Hospitalist Medicine Team B (Angelo/Bradley):  [phone]

## 2017-06-03 NOTE — DISCHARGE INSTRUCTIONS
Take 80mg lasix (2 pills) daily for the next 3 days, then switch back to dose of 40mg daily.   Receive lab work in 1 week  Follow up with PCP in 1 week  Stop Cozaar until lab results and PCP visit for renal impairment. Restart if improved.

## 2017-06-04 NOTE — PT/OT/SLP DISCHARGE
Occupational Therapy Discharge Summary    Angel Rincon  MRN: [id number]   Acute on chronic combined systolic and diastolic heart failure   Patient Discharged from acute Occupational Therapy on 6/3.  Please refer to prior OT note dated on 6/2 for functional status.     Assessment:   Patient appropriate for care in another setting.  GOALS:    Occupational Therapy Goals        Problem: Occupational Therapy Goal    Goal Priority Disciplines Outcome Interventions   Occupational Therapy Goal     OT, PT/OT Ongoing (interventions implemented as appropriate)    Description:  Goals to be met by: 6/30     Patient will increase functional independence with ADLs by performing:    UE Dressing with Modified Ralph.  LE Dressing with Modified Ralph.  Grooming while standing with Modified Ralph.  Toileting from toilet with Modified Ralph for hygiene and clothing management.   Toilet transfer to toilet with Modified Ralph.  Upper extremity exercise program x10 reps per handout, with independence.                    Reasons for Discontinuation of Therapy Services  Transfer to alternate level of care.      Plan:  Patient Discharged to: Home with Home Health Service.

## 2017-06-05 LAB
BACTERIA STL CULT: NORMAL
E COLI SXT1 STL QL IA: NEGATIVE
E COLI SXT2 STL QL IA: NEGATIVE
O+P STL TRI STN: NORMAL

## 2017-06-06 ENCOUNTER — PATIENT OUTREACH (OUTPATIENT)
Dept: ADMINISTRATIVE | Facility: CLINIC | Age: 82
End: 2017-06-06
Payer: MEDICARE

## 2017-06-06 DIAGNOSIS — I50.42 CHRONIC COMBINED SYSTOLIC AND DIASTOLIC CHF (CONGESTIVE HEART FAILURE): Primary | ICD-10-CM

## 2017-06-06 PROBLEM — I50.43 ACUTE ON CHRONIC COMBINED SYSTOLIC AND DIASTOLIC HEART FAILURE: Status: ACTIVE | Noted: 2017-06-06

## 2017-06-06 NOTE — PROGRESS NOTES
C3 nurse attempted to contact patient. No answer. The following message was left for the patient to return the call:  Good evening I am a nurse calling on behalf of [facility] from the [facility].  This is a Transitional Care Call for .anme. When you have a moment please contact us at [phone] or [phone] Monday through Friday, between the hours of 8 am to 4 pm. We look forward to speaking with you. On behalf of [facility] have a nice day.    The patient does not have a scheduled HOSFU appointment within 7-14 days post hospital discharge date 06/03/17. Message sent to Physician staff to assist with HOSFU appointment scheduling.

## 2017-06-08 ENCOUNTER — TELEPHONE (OUTPATIENT)
Dept: INTERNAL MEDICINE | Facility: CLINIC | Age: 82
End: 2017-06-08

## 2017-06-08 NOTE — TELEPHONE ENCOUNTER
Spoke with pt's daughter Nori [facility] might discharged today or tomorrow. Pt is scheduled for a hospital follow up on 6/22/2017 at 11:00am. Ms. Julio will call to inform PCP of when pt is out of the hospital. Understanding voiced.

## 2017-07-31 ENCOUNTER — DOCUMENTATION ONLY (OUTPATIENT)
Dept: ADMINISTRATIVE | Facility: HOSPITAL | Age: 82
End: 2017-07-31

## 2017-07-31 NOTE — PROGRESS NOTES
Faxed results received from [facility], for upper GI endoscopy. PCP notified of procedure and results. Will send to be scanned into chart.

## 2020-08-03 NOTE — ED PROVIDER NOTES
Encounter Date: 3/1/2017       History     Chief Complaint   Patient presents with    Cough     pt ambulatory to triage and reports approx 2 weeks of cough and shortness of breath    Shortness of Breath     Review of patient's allergies indicates:   Allergen Reactions    Citalopram      Other reaction(s): agitation    Codeine Nausea Only    Sulfa (sulfonamide antibiotics)      Other reaction(s): Swelling    Xanax [alprazolam] Other (See Comments)     Unspecified reaction     HPI Comments: 87-year-old female with a past medical history of hypertension, hyperlipidemia, angina with effort, CABG, angioplasty, who presents to emergency room for evaluation of dyspnea on exertion is from present for the past 2 weeks with a nonproductive cough and not associated with any fevers or chest pain.  She does have mild bilateral lower extremity edema and she's been taking her Lasix as prescribed daily.  She states she has a history of congestive heart failure, but is not in the chart.  She does have chronic back pain and was recently started on Norco and has abdominal distention with likely constipation.    Past Medical History:   Diagnosis Date    Angina of effort     Arthritis     Back pain     Cancer     Hyperlipidemia     Hypertension     Osteoporosis     Polycythemia     Restless leg      Past Surgical History:   Procedure Laterality Date    ANGIOPLASTY      CORONARY ARTERY BYPASS GRAFT      HYSTERECTOMY      TONSILLECTOMY       Family History   Problem Relation Age of Onset    Adopted: Yes    Cancer Daughter      soft-tissue sarcoma    Melanoma Neg Hx      Social History   Substance Use Topics    Smoking status: Never Smoker    Smokeless tobacco: Current User    Alcohol use 1.2 oz/week     2 Cans of beer per week     Review of Systems   Constitutional: Negative for fatigue and fever.   HENT: Negative for ear pain, rhinorrhea and sore throat.    Eyes: Negative for pain and visual disturbance.  Zofran for nausea.  Bentyl for cramping.  Return to the Emergency department for any worsening or failure to improve, otherwise follow up with your primary care provider.      Respiratory: Positive for cough and shortness of breath. Negative for choking and wheezing.    Cardiovascular: Positive for leg swelling. Negative for chest pain and palpitations.   Gastrointestinal: Negative for abdominal pain, diarrhea, nausea and vomiting.   Genitourinary: Negative for difficulty urinating.   Musculoskeletal: Negative for arthralgias.   Skin: Negative for rash.   Neurological: Negative for weakness, numbness and headaches.   All other systems reviewed and are negative.      Physical Exam   Initial Vitals   BP Pulse Resp Temp SpO2   03/01/17 2049 03/01/17 2049 03/01/17 2049 03/01/17 2049 03/01/17 2049   159/88 91 24 98 °F (36.7 °C) 97 %     Physical Exam    Nursing note and vitals reviewed.  Constitutional: She appears well-developed.   HENT:   Head: Normocephalic and atraumatic.   Mouth/Throat: Oropharynx is clear and moist.   Eyes: EOM are normal. Pupils are equal, round, and reactive to light.   Neck: Neck supple. No JVD present.   Cardiovascular: Normal rate, regular rhythm, normal heart sounds and intact distal pulses. Exam reveals no gallop and no friction rub.    No murmur heard.  Pulmonary/Chest: Breath sounds normal. She has no wheezes. She has no rhonchi. She has no rales.   Abdominal: Soft. Bowel sounds are normal. She exhibits distension. There is no tenderness. There is no rebound and no guarding.   Musculoskeletal: Normal range of motion. She exhibits edema (mild 1+ bilateral lower ext edema).   Neurological: She is alert and oriented to person, place, and time. She has normal strength. No sensory deficit.   Skin: Skin is warm and dry.   Psychiatric: She has a normal mood and affect.         ED Course   Critical Care  Performed by: EVERARDO MORELAND.  Authorized by: EVERARDO MORELAND   Direct patient critical care time: 15 minutes  Additional history critical care time: 5 minutes  Ordering / reviewing critical care time: 5 minutes  Documentation critical care time: 5  minutes  Consulting other physicians critical care time: 5 minutes  Consult with family critical care time: 5 minutes  Total critical care time (exclusive of procedural time) : 40 minutes  Critical care was necessary to treat or prevent imminent or life-threatening deterioration of the following conditions: cardiac failure and circulatory failure.  Critical care was time spent personally by me on the following activities: discussions with consultants, interpretation of cardiac output measurements, evaluation of patient's response to treatment, examination of patient, ordering and review of laboratory studies, ordering and review of radiographic studies, pulse oximetry, re-evaluation of patient's condition and ordering and performing treatments and interventions (40mg lasix w/ chf).        Labs Reviewed   CBC W/ AUTO DIFFERENTIAL - Abnormal; Notable for the following:        Result Value    WBC 17.73 (*)     Hemoglobin 11.0 (*)     MCH 23.8 (*)     MCHC 28.4 (*)     RDW 19.0 (*)     Platelets 451 (*)     All other components within normal limits   COMPREHENSIVE METABOLIC PANEL   B-TYPE NATRIURETIC PEPTIDE             Medical Decision Making:   Appears to have mild CHF which appears to be a new diagnosis for her.  She is alreadyy on Lasix.  I will give her a dose of 40 mg g of IV Lasix here.  She also appears to have bilateral lower extremity cellulitis and will be started on antibiotics.  Given no hx of chf at her age will admit for new onset with workup.                    ED Course     Clinical Impression:   The primary encounter diagnosis was Acute congestive heart failure, unspecified congestive heart failure type. Diagnoses of SOB (shortness of breath) and Bilateral lower leg cellulitis were also pertinent to this visit.          Gilberto Rowland MD  03/01/17 1229       Gilberto Rowland MD  03/17/17 8480

## 2020-10-23 NOTE — PLAN OF CARE
Problem: Patient Care Overview  Goal: Plan of Care Review  Patient on RA, no respiratory distress noted. Will continue to monitor.   Outcome: Ongoing (interventions implemented as appropriate)  Patient resting throughout shift. Respirations even and unlabored. Skin warm,dry, intact and normal tone of ethnicity. Vital signs stable. Patient denies pain. Patient voiding independently without complications, ambulating per self, with little assistance, to bathroom. Telemetry monitor on and audible displaying no true red alarms. All safety measures maintained. Will continue to monitor       no

## 2024-06-24 NOTE — PLAN OF CARE
Called to evaluate pt for shortness of breath and oxygen requirement. Per nursing pt's O2 saturation was in upper 80s on room air and she was feeling dyspneic. Placed on 2L NC and O2 saturation increased to upper 90s but pt still with symptoms. Upon my exam pt appears comfortable, speaking in full sentences, but reporting shortness of breath. Crackles present it mid lung zones and 2+ edema to mid shins.     Plan:    Will stop fluids for now and continue O2 as needed. Lasix held for SAI, consider dosing in am or sooner if symptoms do not improve with cessation of IVF.     Sanchez Irvin  [facility] Internal Medicine PGY-1    1 Principal Discharge DX:	Nausea and vomiting